# Patient Record
Sex: MALE | Race: WHITE | NOT HISPANIC OR LATINO | Employment: UNEMPLOYED | URBAN - NONMETROPOLITAN AREA
[De-identification: names, ages, dates, MRNs, and addresses within clinical notes are randomized per-mention and may not be internally consistent; named-entity substitution may affect disease eponyms.]

---

## 2017-03-06 DIAGNOSIS — F41.9 ANXIETY: ICD-10-CM

## 2017-03-06 RX ORDER — BUPROPION HYDROCHLORIDE 150 MG/1
150 TABLET ORAL DAILY
Qty: 30 TABLET | Refills: 3 | Status: SHIPPED | OUTPATIENT
Start: 2017-03-06 | End: 2017-05-01

## 2017-03-20 ENCOUNTER — OFFICE VISIT (OUTPATIENT)
Dept: FAMILY MEDICINE CLINIC | Facility: CLINIC | Age: 22
End: 2017-03-20

## 2017-03-20 VITALS
WEIGHT: 172 LBS | HEART RATE: 90 BPM | BODY MASS INDEX: 24.62 KG/M2 | SYSTOLIC BLOOD PRESSURE: 120 MMHG | OXYGEN SATURATION: 98 % | DIASTOLIC BLOOD PRESSURE: 70 MMHG | HEIGHT: 70 IN

## 2017-03-20 DIAGNOSIS — R41.3 MEMORY LOSS: ICD-10-CM

## 2017-03-20 DIAGNOSIS — G89.29 CHRONIC BILATERAL LOW BACK PAIN WITH BILATERAL SCIATICA: Primary | ICD-10-CM

## 2017-03-20 DIAGNOSIS — M54.42 CHRONIC BILATERAL LOW BACK PAIN WITH BILATERAL SCIATICA: Primary | ICD-10-CM

## 2017-03-20 DIAGNOSIS — M54.41 CHRONIC BILATERAL LOW BACK PAIN WITH BILATERAL SCIATICA: Primary | ICD-10-CM

## 2017-03-20 PROCEDURE — 99213 OFFICE O/P EST LOW 20 MIN: CPT | Performed by: FAMILY MEDICINE

## 2017-03-20 RX ORDER — MELOXICAM 15 MG/1
15 TABLET ORAL DAILY
Qty: 30 TABLET | Refills: 3 | Status: SHIPPED | OUTPATIENT
Start: 2017-03-20 | End: 2017-07-24 | Stop reason: SDUPTHER

## 2017-03-20 NOTE — PROGRESS NOTES
Subjective:     Antione Villaseñor is a 21 y.o. male who presents for follow up for arrhythmia.    Murmur:   Patient has a hx of heart murmur. He had a suspected arrythmia. He was supposed to wear a heart monitor, but never went back for a follow up appointment. He has occasionally has palpations. This happens 2-3 times per week. It last 10-15 seconds. He feels that this is due to anxiety.      Short term memory loss:   Patient is still having some short term memory loss. He used some anabolic steroids in Jan 2016. He thinks he got a bad batch. After using it he has mental fogginess and short term memory loss. He was started on Wellbutrin. He feels that the Wellbutrin has been helping. He has continue to have memory loss.     Lower back pain:   He has bilateral lower back pain. He has shooting pain to left ankle and on the right it goes to his right buttocks. He has tried at home physical therapy. He was unable to go to PT due to work schedule. He has tried Ibuprofen, which seems to help some. He does not want to take this every day. He has take a prescription strength NSAID for 3 months in the past. He didn't feel that this helped. He has morning when he awakens and has so much pain he can not walk. He feels that his left leg is weak at those times. He occasionally has difficult urinating. At these time it feels it is difficult to start a stream. No difficulties with BM's.    Preventative:  Over the past 2 weeks, have you felt down, depressed, or hopeless?No   Over the past 2 weeks, have you felt little interest or pleasure in doing things?No  Clinical depression screening refused by patient.No     On osteoporosis therapy?Not Indicated     Past Medical Hx:  Past Medical History   Diagnosis Date   • Ankle joint pain    • Bipolar disorder    • Burning feet syndrome    • Depressive disorder    • Dysuria      Dysuria - OVER ACTIVE BLADDER VS POLYURIA      • Encounter for general adult medical examination without  abnormal findings    • Heart murmur    • Infertile male syndrome    • Intervertebral disc disorders with radiculopathy, lumbar region    • Low back pain    • Male hypogonadism      Male hypogonadism - not receiving testosterone replacement by urology      • Pain in unspecified foot    • Palpitations        Past Surgical Hx:  Past Surgical History   Procedure Laterality Date   • Injection of medication       Kenalog (1)      10/05/2015           Health Maintenance:  Health Maintenance   Topic Date Due   • TDAP/TD VACCINES (2 - Td) 01/01/2021   • INFLUENZA VACCINE  Addressed       Current Meds:    Current Outpatient Prescriptions:   •  buPROPion XL (WELLBUTRIN XL) 150 MG 24 hr tablet, Take 1 tablet by mouth Daily., Disp: 30 tablet, Rfl: 3  •  meloxicam (MOBIC) 15 MG tablet, Take 1 tablet by mouth Daily., Disp: 30 tablet, Rfl: 3    Allergies:  Review of patient's allergies indicates no known allergies.    Family Hx:  Family History   Problem Relation Age of Onset   • Asthma Other    • Diabetes Other    • Heart disease Other    • Hypertension Other    • Lung disease Other    • Coronary artery disease Father    • Hypertension Father         Social History:  Social History     Social History   • Marital status: Single     Spouse name: N/A   • Number of children: N/A   • Years of education: N/A     Occupational History   • Not on file.     Social History Main Topics   • Smoking status: Never Smoker   • Smokeless tobacco: Never Used   • Alcohol use No   • Drug use: No   • Sexual activity: Yes     Partners: Female     Other Topics Concern   • Not on file     Social History Narrative       Review of Systems  Review of Systems   Constitutional: Negative for chills, fatigue and fever.   HENT: Negative for ear pain, hearing loss, sore throat and trouble swallowing.    Eyes: Negative for pain and visual disturbance.   Respiratory: Negative for cough and shortness of breath.    Cardiovascular: Positive for palpitations. Negative  "for chest pain.   Gastrointestinal: Negative for abdominal pain, constipation, diarrhea, nausea and vomiting.   Genitourinary: Positive for frequency and urgency. Negative for dysuria and hematuria.   Musculoskeletal: Positive for back pain. Negative for neck pain.   Skin: Negative for rash and wound.   Neurological: Positive for dizziness. Negative for syncope and headaches.        Patient has normal sensation to light touch, pin pick, and vibration in lower extremities bilaterally.    Psychiatric/Behavioral: Negative for confusion and hallucinations. The patient is not nervous/anxious.        Objective:     Visit Vitals   • /70   • Pulse 90   • Ht 70\" (177.8 cm)   • Wt 172 lb (78 kg)   • SpO2 98%   • BMI 24.68 kg/m2      Physical Exam   Vitals reviewed.    Assessment/Plan:     1. Chronic bilateral low back pain with bilateral sciatica    2. Memory loss       Patient has continued to have problems with his memory and mental fogginess since using the steroids. He has been on the Wellbutrin Xl 150 mg qdaily for more than one year. He wants to wean down on this. I discussed this with the patient. He will take one tablet every other day for 2 weeks, then stop. Patient has had chronic lower back pain with sciatic pain in both legs. He had an MRI in 2015 which showed no abnormalities. He has had no change in the pain in 6 years. I will refer him to Dr. Vasquez for evaluation.   Plan:  1.) I will prescribe Mobic 15 mg qdaily  2.) I will refer the patient to Dr. Vasquez with Orthopedic surgery for evaluation  3.) Patient will take Wellbutrin 150 XL every other day for 2 weeks then stop.     Follow-up:     Return in about 4 weeks (around 4/17/2017).        Preventative:  Male Preventative: Exercises regularly  Vaccines:   Tetanus vaccine: up to date  Annual influenza vaccine: not up to date - Patient refuses this   Pneumococcal vaccine: not up to date - Not indicated at this time.   Hep B vaccine: not up to date - Not " indicated at this time.    Zoster vaccine:not up to date - Not indicated at this time.     Patient does not smoke  does not drink  eat more fruits and vegetables, decrease soda or juice intake and increase physical activity    RISK SCORE: 2              This document has been electronically signed by Memo Hall MD on March 20, 2017 11:37 AM

## 2017-03-21 ENCOUNTER — TRANSCRIBE ORDERS (OUTPATIENT)
Dept: CARDIOLOGY | Facility: HOSPITAL | Age: 22
End: 2017-03-21

## 2017-03-21 DIAGNOSIS — R06.03 ACUTE RESPIRATORY DISTRESS: Primary | ICD-10-CM

## 2017-03-27 ENCOUNTER — HOSPITAL ENCOUNTER (OUTPATIENT)
Dept: CARDIOLOGY | Facility: HOSPITAL | Age: 22
Discharge: HOME | End: 2017-03-27
Attending: INTERNAL MEDICINE | Admitting: INTERNAL MEDICINE

## 2017-03-27 DIAGNOSIS — R06.03 ACUTE RESPIRATORY DISTRESS: ICD-10-CM

## 2017-03-27 PROCEDURE — 93017 CV STRESS TEST TRACING ONLY: CPT

## 2017-03-27 NOTE — H&P
Cardiology History and Physical Note.        Patient Name: Antione Villaseñor  Age/Sex: 21 y.o. male  : 1995  MRN: [id number]    Date of Admission : 3/27/2017    Primary care Physician: Memo Hall MD    Reason for Admission:  Chest pain symptomatic palpitation and regular stress test.      Subjective:       Chief Complaint: Pain symptomatic palpitation    History of Present Illness:  Antione Villaseñor is a 21 y.o. male     There is no height or weight on file to calculate BMI. with a past medical history significant for attention deficit hyperactivity disorder strong family history for coronary artery disease who presents for further evaluation for symptoms of syncope and for symptoms of chest pain.  Patient does have strong family history for coronary artery disease.  Patient had 1 episodes of transient loss of consciousness after the recent demise of his father with coronary artery disease.    Patient presents with symptoms of palpitation along with shortness of breath.  Patient has not been involved in any physical strenuous activity and has had symptoms of palpitation.  Patient resting echocardiogram done reveals sinus rhythm at the rate of 62 bpm.  Patient does complain of having left leg discomfort which she has attributed to sciatica no damage.  Patient on further questioning denies any hemoptysis hematuria bright red blood per rectum.  Patient last blood tests evaluation was a year ago.  Patient denies excessive intake of salt.  Patient denies any excessive intake of coffee or caffeinated beverage.    Patient 10 point review of system except for what is stated in the history of present illness is negative    Past Medical History:  Past Medical History:   Diagnosis Date   • Ankle joint pain    • Bipolar disorder    • Burning feet syndrome    • Depressive disorder    • Dysuria     Dysuria - OVER ACTIVE BLADDER VS POLYURIA      • Encounter for general adult medical examination without  abnormal findings    • Heart murmur    • Infertile male syndrome    • Intervertebral disc disorders with radiculopathy, lumbar region    • Low back pain    • Male hypogonadism     Male hypogonadism - not receiving testosterone replacement by urology      • Pain in unspecified foot    • Palpitations        Past Surgical History:  Past Surgical History:   Procedure Laterality Date   • INJECTION OF MEDICATION      Kenalog (1)      10/05/2015           Family History:  Family History   Problem Relation Age of Onset   • Asthma Other    • Diabetes Other    • Heart disease Other    • Hypertension Other    • Lung disease Other    • Coronary artery disease Father    • Hypertension Father        Social History:  Social History     Social History   • Marital status: Single     Spouse name: N/A   • Number of children: N/A   • Years of education: N/A     Occupational History   • Not on file.     Social History Main Topics   • Smoking status: Never Smoker   • Smokeless tobacco: Never Used   • Alcohol use No   • Drug use: No   • Sexual activity: Yes     Partners: Female     Other Topics Concern   • Not on file     Social History Narrative        Cardiac Risk factor: Male and Family history       Allergies:  No Known Allergies    Medication::    (Not in a hospital admission)        Review of Systems:       Constitutional:  Denies recent weight loss, weight gain, fever or chills, no change in exercise tolerance     HENT:  Denies any hearing loss, epistaxis, hoarseness, or difficulty speaking.     Eyes: Wears eyeglasses or contact lenses     Respiratory:  Denies dyspnea with exertion,no cough, wheezing, or hemoptysis.     Cardiovascular: Negative for palpations, chest pain, orthopnea, PND, peripheral edema, syncope, or claudication.     Gastrointestinal:  Denies change in bowel habits, dyspepsia, ulcer disease, hematochezia, or melena.  No nausea, no vomiting, no hematemesis, no diarrhea or constipation, no melena      Endocrine:  Negative for cold intolerance, heat intolerance, polydipsia, polyphagia and polyuria. Denies any history of weight change, heat/cold intolerance, polydipsia, polyuria     Genitourinary: Negative hor hematuria.      Musculoskeletal: Denies any history of arthritic symptoms or back problems .  No joint pain, joint stiffness, joint swelling, muscle pain, muscle weakness and neck pain    Skin:  Denies any change in hair or nails, rashes, or skin lesions.     Allergic/Immunologic: Negative.  Negative for environmental allergies, food allergies and immunocompromised state.     Neurological:  Denies any history of recurrent headaches, strokes, TIA, or seizure disorder.     Hematological: Denies excessive bleeding, easy bruising, fatigue, lymphadenopathy and petechiae or any bleeding disorders, or lymphadenopathy.     Psychiatric/Behavioral: Denies any history of depression, substance abuse, or change in cognitive function. Denies any psychomotor reaction or tangential thought.  No depression, homicidal ideations and suicidal ideations    Endocrine: No frequent urination and nocturia, temperature intolerance, weight gain, unintended and weight loss, unintended            Objective:     Objective:  There were no vitals filed for this visit.  .    There is no height or weight on file to calculate BMI.           Physical Exam:   General Appearance:    Alert, oriented, cooperative, in no acute distress   Head:    Normocephalic, atraumatic, without obvious abnormality   Eyes:           ARIELLA  Lids and lashes normal, conjunctivae and sclerae normal, no icterus, no pallor   Ears:    Ears appear intact with no abnormalities noted   Throat:   Mucous membranes pink and moist   Neck:   Supple, trachea midline, no carotid bruit, no organomegaly or JVD   Lungs:     Clear to auscultation and percussion, respirations regular, even and Unlabored. No wheezes, rales, rhonchi    Heart:    Regular rhythm and normal rate, normal S1 and S2, no             murmur, no gallop, no rub, no click   Abdomen:     Soft, non-tender, non-distended, no guarding, no rebound tenderness, Normal bowel sounds in all four quadrant, no masses, liver and spleen nonpalpable,    Genitalia:    Deferred   Extremities:   Moves all extremities well, no edema, no cyanosis, no              Redness, no clubbing   Pulses:   Pulses palpable and equal bilaterally   Skin:   Moist and warm. No bleeding, bruising or rash   Neurologic/Psychiatric:   Alert and oriented to person, place, and time.  Motor, power and tone in upper and lower extremity is grossly intact.  No focal neurological deficits. Normal cognitive function. No psychomotor reaction or tangential thought. No depression, homicidal ideations and suicidal ideations           Lab Review:           Invalid input(s): LABALBU, PROT                                Invalid input(s):  T4,  FREET4    EKG:   ECG/EMG Results (last 24 hours)     ** No results found for the last 24 hours. **          Imaging:  Imaging Results (last 24 hours)     ** No results found for the last 24 hours. **          I personally viewed and interpreted the patient's EKG/Telemetry data.    Assessment:   1.  Chest pain with shortness of breath symptomatic palpitation.  2.  Attention deficit hyperactivity disorder.  3.  Strong family history for coronary artery disease.          Plan:   1.  Chest pain which family history for coronary artery disease symptomatic palpitation.  Patient was recommended to undergo a transthoracic echocardiogram and a regular exercise stress tests.  Risk-benefit treatment options were discussed with the patient.  2.  Attention deficit hyperactivity disorder.  Patient is currently on Wellbutrin and has been followed by the primary care physician.      Time: time spent in face-to-face evaluation off greater than 60 minutes and interacting and formulating examining and discussing the plan with the patient with 50% of greater time spent in  face-to-face interaction.    Jaime Andre MD  03/27/17  1:31 PM      EMR Dragon/Transcription disclaimer:   Some of this note may be an electronic transcription/translation of spoken language to printed text. The electronic translation of spoken language may permit erroneous, or at times, nonsensical words or phrases to be inadvertently transcribed; Although I have reviewed the note for such errors, some may still exist.

## 2017-04-16 LAB
BH CV STRESS BP STAGE 1: NORMAL
BH CV STRESS BP STAGE 2: NORMAL
BH CV STRESS BP STAGE 3: NORMAL
BH CV STRESS BP STAGE 4: NORMAL
BH CV STRESS DURATION MIN STAGE 1: 3
BH CV STRESS DURATION MIN STAGE 2: 3
BH CV STRESS DURATION MIN STAGE 3: 3
BH CV STRESS DURATION MIN STAGE 4: 3
BH CV STRESS DURATION SEC STAGE 1: 0
BH CV STRESS DURATION SEC STAGE 2: 0
BH CV STRESS DURATION SEC STAGE 3: 0
BH CV STRESS DURATION SEC STAGE 4: 0
BH CV STRESS GRADE STAGE 1: 10
BH CV STRESS GRADE STAGE 2: 12
BH CV STRESS GRADE STAGE 3: 14
BH CV STRESS GRADE STAGE 4: 16
BH CV STRESS HR STAGE 1: 96
BH CV STRESS HR STAGE 2: 111
BH CV STRESS HR STAGE 3: 136
BH CV STRESS HR STAGE 4: 187
BH CV STRESS METS STAGE 1: 5
BH CV STRESS METS STAGE 2: 7.5
BH CV STRESS METS STAGE 3: 10
BH CV STRESS METS STAGE 4: 13.5
BH CV STRESS PROTOCOL 1: NORMAL
BH CV STRESS RECOVERY BP: NORMAL MMHG
BH CV STRESS RECOVERY HR: 100 BPM
BH CV STRESS SPEED STAGE 1: 1.7
BH CV STRESS SPEED STAGE 2: 2.5
BH CV STRESS SPEED STAGE 3: 3.4
BH CV STRESS SPEED STAGE 4: 4.2
BH CV STRESS STAGE 1: 1
BH CV STRESS STAGE 2: 2
BH CV STRESS STAGE 3: 3
BH CV STRESS STAGE 4: 4
MAXIMAL PREDICTED HEART RATE: 197 BPM
PERCENT MAX PREDICTED HR: 96.45 %
STRESS BASELINE BP: NORMAL MMHG
STRESS BASELINE HR: 70 BPM
STRESS PERCENT HR: 113 %
STRESS POST ESTIMATED WORKLOAD: 13.4 METS
STRESS POST EXERCISE DUR MIN: 12 MIN
STRESS POST PEAK BP: NORMAL MMHG
STRESS POST PEAK HR: 190 BPM
STRESS TARGET HR: 167 BPM

## 2017-04-24 ENCOUNTER — OFFICE VISIT (OUTPATIENT)
Dept: ORTHOPEDIC SURGERY | Facility: CLINIC | Age: 22
End: 2017-04-24

## 2017-04-24 VITALS — BODY MASS INDEX: 25.05 KG/M2 | HEIGHT: 70 IN | WEIGHT: 175 LBS

## 2017-04-24 DIAGNOSIS — M51.16 INTERVERTEBRAL DISC DISORDERS WITH RADICULOPATHY, LUMBAR REGION: Primary | ICD-10-CM

## 2017-04-24 PROCEDURE — 72100 X-RAY EXAM L-S SPINE 2/3 VWS: CPT | Performed by: ORTHOPAEDIC SURGERY

## 2017-04-24 PROCEDURE — 99202 OFFICE O/P NEW SF 15 MIN: CPT | Performed by: ORTHOPAEDIC SURGERY

## 2017-04-24 NOTE — PROGRESS NOTES
Procedure   Procedures      Lumbar spine 2v- normal alignment, good bone quality, disc heights are appropriate.  Vertebra are normal.
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
## 2017-05-01 ENCOUNTER — OFFICE VISIT (OUTPATIENT)
Dept: FAMILY MEDICINE CLINIC | Facility: CLINIC | Age: 22
End: 2017-05-01

## 2017-05-01 VITALS
DIASTOLIC BLOOD PRESSURE: 90 MMHG | WEIGHT: 178 LBS | SYSTOLIC BLOOD PRESSURE: 140 MMHG | HEIGHT: 70 IN | OXYGEN SATURATION: 99 % | HEART RATE: 89 BPM | BODY MASS INDEX: 25.48 KG/M2

## 2017-05-01 DIAGNOSIS — F41.1 GENERALIZED ANXIETY DISORDER: Primary | ICD-10-CM

## 2017-05-01 DIAGNOSIS — M51.16 INTERVERTEBRAL DISC DISORDERS WITH RADICULOPATHY, LUMBAR REGION: ICD-10-CM

## 2017-05-01 PROCEDURE — 99213 OFFICE O/P EST LOW 20 MIN: CPT | Performed by: FAMILY MEDICINE

## 2017-05-01 RX ORDER — BUSPIRONE HYDROCHLORIDE 5 MG/1
5 TABLET ORAL 2 TIMES DAILY
Qty: 60 TABLET | Refills: 2 | Status: SHIPPED | OUTPATIENT
Start: 2017-05-01 | End: 2017-05-26 | Stop reason: SDUPTHER

## 2017-05-03 DIAGNOSIS — M54.5 ACUTE LOW BACK PAIN, UNSPECIFIED BACK PAIN LATERALITY, WITH SCIATICA PRESENCE UNSPECIFIED: Primary | ICD-10-CM

## 2017-05-03 PROBLEM — M54.9 BACK PAIN: Status: ACTIVE | Noted: 2017-05-03

## 2017-05-09 PROCEDURE — 87081 CULTURE SCREEN ONLY: CPT | Performed by: FAMILY MEDICINE

## 2017-05-26 ENCOUNTER — TELEPHONE (OUTPATIENT)
Dept: FAMILY MEDICINE CLINIC | Facility: CLINIC | Age: 22
End: 2017-05-26

## 2017-05-26 DIAGNOSIS — F41.1 GENERALIZED ANXIETY DISORDER: ICD-10-CM

## 2017-05-26 RX ORDER — BUSPIRONE HYDROCHLORIDE 5 MG/1
TABLET ORAL
Qty: 90 TABLET | Refills: 0 | Status: SHIPPED | OUTPATIENT
Start: 2017-05-26 | End: 2017-06-26

## 2017-06-07 ENCOUNTER — TELEPHONE (OUTPATIENT)
Dept: ORTHOPEDIC SURGERY | Facility: CLINIC | Age: 22
End: 2017-06-07

## 2017-06-07 DIAGNOSIS — M54.5 ACUTE LOW BACK PAIN, UNSPECIFIED BACK PAIN LATERALITY, WITH SCIATICA PRESENCE UNSPECIFIED: Primary | ICD-10-CM

## 2017-06-07 DIAGNOSIS — M51.16 INTERVERTEBRAL DISC DISORDERS WITH RADICULOPATHY, LUMBAR REGION: ICD-10-CM

## 2017-06-12 ENCOUNTER — HOSPITAL ENCOUNTER (OUTPATIENT)
Dept: PHYSICAL THERAPY | Facility: HOSPITAL | Age: 22
Setting detail: THERAPIES SERIES
Discharge: HOME | End: 2017-06-12
Attending: ORTHOPAEDIC SURGERY

## 2017-06-12 DIAGNOSIS — M54.40 MIDLINE LOW BACK PAIN WITH SCIATICA, SCIATICA LATERALITY UNSPECIFIED, UNSPECIFIED CHRONICITY: Primary | ICD-10-CM

## 2017-06-12 DIAGNOSIS — M51.16 INTERVERTEBRAL DISC DISORDER WITH RADICULOPATHY OF LUMBAR REGION: ICD-10-CM

## 2017-06-12 PROCEDURE — 97162 PT EVAL MOD COMPLEX 30 MIN: CPT | Performed by: PHYSICAL THERAPIST

## 2017-06-12 NOTE — PROGRESS NOTES
Outpatient Physical Therapy Ortho Initial Evaluation  [facility]     Patient Name: Antione Villaseñor  : 1995  MRN: [id number]  Today's Date: 2017    Attendance: . Not yet approved by insurance. Precert required.  MD follow up- none scheduled.    Visit Date: 2017    Patient Active Problem List   Diagnosis   • Palpitations   • Pain in unspecified foot   • Male hypogonadism   • Low back pain   • Intervertebral disc disorders with radiculopathy, lumbar region   • Infertile male syndrome   • Heart murmur   • Encounter for general adult medical examination without abnormal findings   • Dysuria   • Depressive disorder   • Burning feet syndrome   • Bipolar disorder   • Ankle joint pain   • Memory loss   • Back pain        Past Medical History:   Diagnosis Date   • Ankle joint pain    • Bipolar disorder    • Burning feet syndrome    • Depressive disorder    • Dysuria     Dysuria - OVER ACTIVE BLADDER VS POLYURIA      • Encounter for general adult medical examination without abnormal findings    • Heart murmur    • Infertile male syndrome    • Intervertebral disc disorders with radiculopathy, lumbar region    • Low back pain    • Male hypogonadism     Male hypogonadism - not receiving testosterone replacement by urology      • Pain in unspecified foot    • Palpitations      Outpatient Medications     busPIRone (BUSPAR) 5 MG tablet      meloxicam (MOBIC) 15 MG tablet      ALLERGIES: NKDA     Past Surgical History:   Procedure Laterality Date   • INJECTION OF MEDICATION      Kenalog (1)      10/05/2015           Visit Dx:     ICD-10-CM ICD-9-CM   1. Midline low back pain with sciatica, sciatica laterality unspecified, unspecified chronicity M54.40 724.3   2. Intervertebral disc disorder with radiculopathy of lumbar region M51.16 724.4             Patient History       17 1100          History    Chief Complaint Pain  -DD      Type of Pain Lower Extremity / Leg  -DD      Date Current  Problem(s) Began --   a few years  -DD      Brief Description of Current Complaint Patient reports left hip and leg sciatica.  The back really never hurts. It did when he lifted weights but not for a while now.  Occasional periodic right leg symptoms but rare. Left leg gets weak at times and he can barely walk.  -DD      Previous treatment for THIS PROBLEM Chiropractor;Medication  -DD      Occupation/sports/leisure activities Works for [employer] full time.  -DD        User Key  (r) = Recorded By, (t) = Taken By, (c) = Cosigned By    Initials Name Provider Type    COLLETTE Joseph, PT Physical Therapist                PT Ortho       06/12/17 1100    Subjective Comments    Subjective Comments When in High school went to chiropractor a lot.  Tries no to pop his own back anymore.  -DD    Subjective Pain    Able to rate subjective pain? yes  -DD    Pre-Treatment Pain Level 4  -DD    Post-Treatment Pain Level 4  -DD    Posture/Observations    Observations Muscle atrophy   left quad  -DD    Posture/Observations Comments seated slumped posture. Left LLD 1/4 inch. posterior left sacral base.  -DD    Sensation    Sensation WNL? WNL  -DD    Light Touch No apparent deficits  -DD    Neural Tension Signs- Lower Quarter Clearing    SLR Bilateral:;Negative  -DD    Prone knee flexion Left:;Positive  -DD    Myotomal Screen- Lower Quarter Clearing    Hip flexion (L2) 5 (Normal)  -DD    Knee extension (L3) 5 (Normal)  -DD    Ankle DF (L4) 5 (Normal)  -DD    Great toe extension (L5) 5 (Normal)  -DD    Ankle PF (S1) 5 (Normal)  -DD    Knee flexion (S2) 5 (Normal)  -DD    Lumbar ROM Screen- Lower Quarter Clearing    Lumbar Flexion Normal  -DD    Lumbar Extension Normal  -DD    Lumbar Lateral Flexion Normal  -DD    Lumbar Rotation Normal  -DD    SI/Hip Screen- Lower Quarter Clearing    Mena's/Lenin's test Left:;Positive  -DD      User Key  (r) = Recorded By, (t) = Taken By, (c) = Cosigned By    Initials Name Provider Type     COLLETTE Joseph, PT Physical Therapist                            Therapy Education       06/12/17 1158          Therapy Education    Given Posture/body mechanics  -DD      Program New  -DD      How Provided Verbal  -DD      Provided to Patient  -DD      Level of Understanding Teach back education performed;Demonstrated  -DD        User Key  (r) = Recorded By, (t) = Taken By, (c) = Cosigned By    Initials Name Provider Type    COLLETTE Joseph, PT Physical Therapist                PT OP Goals       06/12/17 1100       PT Short Term Goals    STG Date to Achieve 07/07/17  -DD     STG 1 Patient will be independent in a home exercise program  -DD     STG 2 Patient will be able to stretch left hip flexor without back pain  -DD     STG 3 Patient will maintain level sacral base  -DD     STG 4 Patient will maintain equal leg length   -DD     STG 5 Modified Oswestry score decreased to 6%  -DD     STG 6 Patient report decreased left lower extremity symptoms  -DD     Long Term Goals    LTG 1 Patient will be independent in final home exercise program  -DD     LTG 2 Patient will be able to report 50% improvement  -DD     Time Calculation    PT Goal Re-Cert Due Date 07/07/17  -DD       User Key  (r) = Recorded By, (t) = Taken By, (c) = Cosigned By    Initials Name Provider Type    COLLETTE Joseph, PT Physical Therapist                PT Assessment/Plan       06/12/17 1154       PT Assessment    Functional Limitations Impaired gait;Performance in self-care ADL;Performance in work activities;Performance in leisure activities  -DD     Impairments Gait;Pain;Posture  -DD     Assessment Comments Patient has chronic issues and is are ready in overall good physical condition.  He is not sure that additional exercises will help  -DD     Please refer to paper survey for additional self-reported information Yes  -DD     Rehab Potential Fair  -DD     Patient/caregiver participated in establishment of treatment plan  and goals Yes  -DD     Patient would benefit from skilled therapy intervention Yes  -DD     PT Plan    PT Frequency 2x/week  -DD     Predicted Duration of Therapy Intervention (days/wks) 4 weeks  -DD     Planned CPT's? PT EVAL MOD COMPLELITY: 71377;PT RE-EVAL: 09580;PT MANUAL THERAPY EA 15 MIN: 83203;PT NEUROMUSC RE-EDUCATION EA 15 MIN: 35171;PT HOT/COLD PACK WC NONMCARE: 02758;PT ELECTRICAL STIM UNATTEND:   -DD     Physical Therapy Interventions (Optional Details) manual therapy techniques;dry needling;home exercise program;modalities;ROM (Range of Motion);strengthening;stretching;joint mobilization;lumbar stabilization  -DD     PT Plan Comments Core stabilization activities lower extremity strengthening and flexibility activities.  There is quad atrophy on the left, close clinics chain activities.  Manual therapy for alignment.  May consider heel lift if leg length remains unequal.  Modalities may include electrical stimulation IFC and ice. consider trigger point dry needling  -DD       User Key  (r) = Recorded By, (t) = Taken By, (c) = Cosigned By    Initials Name Provider Type    COLLETTE Joseph, PT Physical Therapist                  Exercises       06/12/17 1100          Subjective Comments    Subjective Comments When in High school went to chiropractor a lot.  Tries no to pop his own back anymore.  -DD      Subjective Pain    Able to rate subjective pain? yes  -DD      Pre-Treatment Pain Level 4  -DD      Post-Treatment Pain Level 4  -DD        User Key  (r) = Recorded By, (t) = Taken By, (c) = Cosigned By    Initials Name Provider Type    COLLETTE Joseph, JACOB Physical Therapist                              Outcome Measures       06/12/17 1100          Modified Oswestry    Modified Oswestry Score/Comments 10%  -DD      Functional Assessment    Outcome Measure Options Modifed Owestry  -DD        User Key  (r) = Recorded By, (t) = Taken By, (c) = Cosigned By    Initials Name Provider Type     DD Alia Joseph, PT Physical Therapist            Time Calculation:   Start Time: 1101  Stop Time: 1129  Time Calculation (min): 28 min  Total Timed Code Minutes- PT: 0 minute(s)     Therapy Charges for Today     Code Description Service Date Service Provider Modifiers Qty    89356718079 HC PT EVAL MOD COMPLEXITY 2 6/12/2017 Alia Joseph, PT GP 1          PT G-Codes  Outcome Measure Options: Modifed Owestry         Alia Joseph, PT, ATC, DPT  6/12/2017

## 2017-06-15 ENCOUNTER — HOSPITAL ENCOUNTER (OUTPATIENT)
Dept: PHYSICAL THERAPY | Facility: HOSPITAL | Age: 22
Setting detail: THERAPIES SERIES
Discharge: HOME | End: 2017-06-15
Attending: ORTHOPAEDIC SURGERY

## 2017-06-15 DIAGNOSIS — M54.40 MIDLINE LOW BACK PAIN WITH SCIATICA, SCIATICA LATERALITY UNSPECIFIED, UNSPECIFIED CHRONICITY: Primary | ICD-10-CM

## 2017-06-15 DIAGNOSIS — M51.16 INTERVERTEBRAL DISC DISORDER WITH RADICULOPATHY OF LUMBAR REGION: ICD-10-CM

## 2017-06-15 PROCEDURE — 97110 THERAPEUTIC EXERCISES: CPT

## 2017-06-26 ENCOUNTER — OFFICE VISIT (OUTPATIENT)
Dept: FAMILY MEDICINE CLINIC | Facility: CLINIC | Age: 22
End: 2017-06-26

## 2017-06-26 ENCOUNTER — HOSPITAL ENCOUNTER (OUTPATIENT)
Dept: PHYSICAL THERAPY | Facility: HOSPITAL | Age: 22
Setting detail: THERAPIES SERIES
Discharge: HOME | End: 2017-06-26

## 2017-06-26 VITALS
HEIGHT: 70 IN | BODY MASS INDEX: 24.79 KG/M2 | OXYGEN SATURATION: 99 % | HEART RATE: 54 BPM | DIASTOLIC BLOOD PRESSURE: 66 MMHG | WEIGHT: 173.19 LBS | SYSTOLIC BLOOD PRESSURE: 120 MMHG

## 2017-06-26 DIAGNOSIS — F41.9 ANXIETY: Primary | ICD-10-CM

## 2017-06-26 DIAGNOSIS — M54.40 MIDLINE LOW BACK PAIN WITH SCIATICA, SCIATICA LATERALITY UNSPECIFIED, UNSPECIFIED CHRONICITY: Primary | ICD-10-CM

## 2017-06-26 DIAGNOSIS — M51.16 INTERVERTEBRAL DISC DISORDER WITH RADICULOPATHY OF LUMBAR REGION: ICD-10-CM

## 2017-06-26 PROCEDURE — 97110 THERAPEUTIC EXERCISES: CPT

## 2017-06-26 PROCEDURE — 99213 OFFICE O/P EST LOW 20 MIN: CPT | Performed by: FAMILY MEDICINE

## 2017-06-26 RX ORDER — BUPROPION HYDROCHLORIDE 150 MG/1
150 TABLET ORAL DAILY
Qty: 30 TABLET | Refills: 0 | Status: SHIPPED | OUTPATIENT
Start: 2017-06-26 | End: 2017-07-24 | Stop reason: SDUPTHER

## 2017-06-26 NOTE — PROGRESS NOTES
"Subjective   Antione Villaseñor is a 21 y.o. male.     History of Present Illness  Patient is a 21-year-old  male who is presenting today as an anxiety follow-up.  Patient mentions steroid abuse early in the past.  Patient has been on several SSRIs as well as has had success with Wellbutrin in the past.  Patient proximally month ago was put on BuSpar and states that early on he was having good control but over the past 2 nights he has developed some mood swings from this medication and is wondering if he could go back on the Wellbutrin.  Patient denies any fevers, chest pain, shortness of breath, or any other complaints at this time.  The following portions of the patient's history were reviewed and updated as appropriate: allergies, current medications, past family history, past medical history, past social history, past surgical history and problem list.    Review of Systems      Constitutional: no weight loss, fever, chills, weakness  HEENT: no visual loss, blurred vision, double vision, yellow scalarea  Skin: no rash, no discoloration   CVS: no chest pain, palpitations  Chest: no shortness of air, cough, dyspnea  GI: no abdominal pain, blood in stool, no nausea, vomiting, diarrhea  : no burning in urination, change in odor, no change in frequency  Neuro: no headache, dizziness, syncope, paralysis, ataxia, numbness  Musculoskeletal: no muscle, back pain, joint pain, stiffness  Lymphatics: no enlarged nodes  Endo: no reports of sweating, cold or heat intolerance, no polyuria      Objective   Physical Exam  /66 (BP Location: Left arm, Patient Position: Sitting, Cuff Size: Adult)  Pulse 54  Ht 70\" (177.8 cm)  Wt 173 lb 3 oz (78.6 kg)  SpO2 99%  BMI 24.85 kg/m2      GENERAL APPEARANCE: Well developed, well nourished, in no acute distress.  SKIN: Inspection of the skin reveals no rashes, ulcerations or petechiae.  HEENT: The sclerae were anicteric and conjunctivae were pink and moist. " Extraocular movements were intact and pupils were equal, round, and reactive to light with normal accommodation. External inspection of the ears and nose showed no scars, lesions, or masses. Lips, teeth, and gums showed normal mucosa. The oral mucosa, hard and soft palate, tongue and posterior pharynx were normal.  NECK: Supple and symmetric. There was no thyroid enlargement, and no tenderness, or masses were felt.  CHEST: Normal AP diameter and normal contour without any kyphoscoliosis.  LUNGS: Auscultation of the lungs revealed normal breath sounds without any other adventitious sounds or rubs.  CARDIOVASCULAR: There was a regular rate and rhythm without any murmurs, gallops, rubs. The carotid pulses were normal and 2+ bilaterally without bruits. Peripheral pulses were 2+ and symmetric.  ABDOMEN: Soft and nontender with normal bowel sounds. The liver edge was nontender. The spleen was not palpable. There were no inguinal or umbilical hernias noted. No ascites was noted.      Assessment/Plan   Antione was seen today for anxiety.    Diagnoses and all orders for this visit:    Anxiety    Other orders  -     buPROPion XL (WELLBUTRIN XL) 150 MG 24 hr tablet; Take 1 tablet by mouth Daily.             This document has been electronically signed by Valeria Ambrose MD on June 26, 2017 10:20 AM

## 2017-06-26 NOTE — THERAPY TREATMENT NOTE
Outpatient Physical Therapy Ortho Treatment Note  [facility]     Patient Name: Antione Villaseñor  : 1995  MRN: [id number]  Today's Date: 2017      Visit Date: 2017     Sub imp 0%  Visit 3/3 (Eval + 8)  MD Goodwin  Re 7/3/17    Visit Dx:    ICD-10-CM ICD-9-CM   1. Midline low back pain with sciatica, sciatica laterality unspecified, unspecified chronicity M54.40 724.3   2. Intervertebral disc disorder with radiculopathy of lumbar region M51.16 724.4       Patient Active Problem List   Diagnosis   • Palpitations   • Pain in unspecified foot   • Male hypogonadism   • Low back pain   • Intervertebral disc disorders with radiculopathy, lumbar region   • Infertile male syndrome   • Heart murmur   • Encounter for general adult medical examination without abnormal findings   • Dysuria   • Depressive disorder   • Burning feet syndrome   • Bipolar disorder   • Ankle joint pain   • Memory loss   • Back pain   • Anxiety        Past Medical History:   Diagnosis Date   • Ankle joint pain    • Bipolar disorder    • Burning feet syndrome    • Depressive disorder    • Dysuria     Dysuria - OVER ACTIVE BLADDER VS POLYURIA      • Encounter for general adult medical examination without abnormal findings    • Heart murmur    • Infertile male syndrome    • Intervertebral disc disorders with radiculopathy, lumbar region    • Low back pain    • Male hypogonadism     Male hypogonadism - not receiving testosterone replacement by urology      • Pain in unspecified foot    • Palpitations         Past Surgical History:   Procedure Laterality Date   • INJECTION OF MEDICATION      Kenalog (1)      10/05/2015                 PT Ortho       17 1300    Posture/Observations    Posture/Observations Comments (P)  post torsion L hemipelvis/ lumbars Rot. sacrum deep R  -MANJEET      User Key  (r) = Recorded By, (t) = Taken By, (c) = Cosigned By    Initials Name Provider Type    MANJEET Yancey, ATC                              PT Assessment/Plan       06/26/17 1400       PT Assessment    Assessment Comments (P)  correctely SI/lumbars/sacrum post ME/Mobe. issued heel lift for L to help maintain LL. Reported decreased weakness feeling with L LE post distraction, neural tension stretch  -MANJEET     PT Plan    PT Plan Comments (P)  Cont per POC, manual, neural tension, stab  -MANJEET       User Key  (r) = Recorded By, (t) = Taken By, (c) = Cosigned By    Initials Name Provider Type    MANJEET Yancey ATC                     Exercises       06/26/17 1300          Subjective Comments    Subjective Comments (P)  reports no real pain. cont weakness/numbness with L LE to ankle  -MANJEET      Subjective Pain    Able to rate subjective pain? (P)  yes  -MANJEET      Pre-Treatment Pain Level (P)  1  -MANJEET      Post-Treatment Pain Level (P)  0  -MANJEET      Aquatics    Aquatics performed? (P)  No  -MANJEET      Exercise 1    Exercise Name 1 (P)  Pro II level 2 seat 9  -MANJEET      Time (Minutes) 1 (P)  5  -MANJEET      Exercise 2    Exercise Name 2 (P)  HS stretch   -MANJEET      Reps 2 (P)  3  -MANJEET      Time (Seconds) 2 (P)  30  -MANJEET      Exercise 3    Exercise Name 3 (P)  incline calf stretch   pain in ankle with CR  -MANJEET      Sets 3 (P)  3  -MANJEET      Reps 3 (P)  --  -MANJEET      Time (Seconds) 3 (P)  30  -MANJEET      Exercise 4    Exercise Name 4 (P)  supine piriformis stretch  -MANJEET      Reps 4 (P)  3  -MANJEET      Time (Seconds) 4 (P)  30  -MANJEET      Exercise 5    Exercise Name 5 (P)  Manual L ham stretch  -MANJEET      Sets 5 (P)  3  -MANJEET      Reps 5 (P)  --  -MANJEET      Time (Seconds) 5 (P)  30  -MANJEET      Exercise 6    Exercise Name 6 (P)  bridges w Add  -MANJEET      Sets 6 (P)  2  -MANJEET      Reps 6 (P)  10  -MANJEET      Exercise 7    Exercise Name 7 (P)  hooklying trans abs with Add  -MANJEET      Sets 7 (P)  2  -MANJEET      Reps 7 (P)  10  -MANJEET      Exercise 8    Exercise Name 8 (P)  L LE distraction  -MANJEET      Reps 8 (P)  --  -MANJEET      Time (Minutes) 8 (P)  5  -MANJEET      Exercise 9    Exercise Name  9 (P)  Slump neural tension  -MANJEET      Sets 9 (P)  --   10  -MANJEET      Reps 9 (P)  10  -MANJEET        User Key  (r) = Recorded By, (t) = Taken By, (c) = Cosigned By    Initials Name Provider Type    MANJEET Yancey ATC                         Manual Rx (last 36 hours)      Manual Treatments       06/26/17 1300          Manual Rx 1    Manual Rx 1 Location (P)  MET/mob L SI/lumbar/Sacrum  -MANJEET        User Key  (r) = Recorded By, (t) = Taken By, (c) = Cosigned By    Initials Name Provider Type    MANJEET Yancey ATC                 PT OP Goals       06/26/17 1200       PT Short Term Goals    STG Date to Achieve (P)  07/07/17  -MANJEET     STG 1 (P)  Patient will be independent in a home exercise program  -MANJEET     STG 1 Progress (P)  Not Met  -MANJEET     STG 2 (P)  Patient will be able to stretch left hip flexor without back pain  -MANJEET     STG 2 Progress (P)  Not Met  -MANJEET     STG 3 (P)  Patient will maintain level sacral base  -MANJEET     STG 3 Progress (P)  Not Met  -MANEJET     STG 4 (P)  Patient will maintain equal leg length   -MANJEET     STG 4 Progress (P)  Not Met  -MANJEET     STG 5 (P)  Modified Oswestry score decreased to 6%  -MANJEET     STG 5 Progress (P)  Not Met  -MANJEET     STG 6 (P)  Patient report decreased left lower extremity symptoms  -MANJEET     Long Term Goals    LTG 1 (P)  Patient will be independent in final home exercise program  -MANJEET     LTG 1 Progress (P)  Not Met  -MANJEET     LTG 2 (P)  Patient will be able to report 50% improvement  -MANJEET     LTG 2 Progress (P)  Not Met  -MANJEET       User Key  (r) = Recorded By, (t) = Taken By, (c) = Cosigned By    Initials Name Provider Type    MANJEET Yancey [facility]                     Time Calculation:   Start Time: (P) 1300  Stop Time: (P) 1400  Time Calculation (min): (P) 60 min  Total Timed Code Minutes- PT: (P) 60 minute(s)    Therapy Charges for Today     Code Description Service Date Service Provider Modifiers Qty    47917592265 HC PT THER PROC EA 15 MIN  6/26/2017 Iftikhar Yancey, [facility]  4                    Iftikhar Yancey, RILEY  6/26/2017

## 2017-06-28 ENCOUNTER — HOSPITAL ENCOUNTER (OUTPATIENT)
Dept: PHYSICAL THERAPY | Facility: HOSPITAL | Age: 22
Setting detail: THERAPIES SERIES
End: 2017-06-28

## 2017-07-05 ENCOUNTER — HOSPITAL ENCOUNTER (OUTPATIENT)
Dept: PHYSICAL THERAPY | Facility: HOSPITAL | Age: 22
Setting detail: THERAPIES SERIES
Discharge: HOME | End: 2017-07-05

## 2017-07-05 DIAGNOSIS — M51.16 INTERVERTEBRAL DISC DISORDER WITH RADICULOPATHY OF LUMBAR REGION: ICD-10-CM

## 2017-07-05 DIAGNOSIS — M54.40 MIDLINE LOW BACK PAIN WITH SCIATICA, SCIATICA LATERALITY UNSPECIFIED, UNSPECIFIED CHRONICITY: Primary | ICD-10-CM

## 2017-07-05 PROCEDURE — 97110 THERAPEUTIC EXERCISES: CPT | Performed by: PHYSICAL THERAPIST

## 2017-07-05 NOTE — THERAPY PROGRESS REPORT/RE-CERT
Outpatient Physical Therapy Ortho Progress Note  [facility]     Patient Name: Antione Villaseñor  : 1995  MRN: [id number]  Today's Date: 2017      Visit Date: 2017    Visit 4/ (eval + 8)  0% improvement  MD KAPLAN   Recert date 17          Visit Dx:    ICD-10-CM ICD-9-CM   1. Midline low back pain with sciatica, sciatica laterality unspecified, unspecified chronicity M54.40 724.3   2. Intervertebral disc disorder with radiculopathy of lumbar region M51.16 724.4       Patient Active Problem List   Diagnosis   • Palpitations   • Pain in unspecified foot   • Male hypogonadism   • Low back pain   • Intervertebral disc disorders with radiculopathy, lumbar region   • Infertile male syndrome   • Heart murmur   • Encounter for general adult medical examination without abnormal findings   • Dysuria   • Depressive disorder   • Burning feet syndrome   • Bipolar disorder   • Ankle joint pain   • Memory loss   • Back pain   • Anxiety        Past Medical History:   Diagnosis Date   • Ankle joint pain    • Bipolar disorder    • Burning feet syndrome    • Depressive disorder    • Dysuria     Dysuria - OVER ACTIVE BLADDER VS POLYURIA      • Encounter for general adult medical examination without abnormal findings    • Heart murmur    • Infertile male syndrome    • Intervertebral disc disorders with radiculopathy, lumbar region    • Low back pain    • Male hypogonadism     Male hypogonadism - not receiving testosterone replacement by urology      • Pain in unspecified foot    • Palpitations         Past Surgical History:   Procedure Laterality Date   • INJECTION OF MEDICATION      Kenalog (1)      10/05/2015                 PT Ortho       17 1800    Sensation    Sensation WNL? WNL  -KW    Neural Tension Signs- Lower Quarter Clearing    SLR Bilateral:;Negative  -KW    Prone knee flexion Left:;Negative  -KW      User Key  (r) = Recorded By, (t) = Taken By, (c) = Cosigned By    Initials Name Provider  Type    KW Nancy Burns, PT Physical Therapist                            PT Assessment/Plan       07/05/17 1829       PT Assessment    Functional Limitations Performance in sport activities;Performance in leisure activities  -KW     Impairments Pain;Posture;Gait;Muscle strength  -KW     Assessment Comments Reviewed the importance of core strengthening  -KW     Please refer to paper survey for additional self-reported information No  -KW     Rehab Potential Good  -KW     Patient/caregiver participated in establishment of treatment plan and goals Yes  -KW     Patient would benefit from skilled therapy intervention Yes  -KW     PT Plan    PT Frequency 2x/week  -KW     Predicted Duration of Therapy Intervention (days/wks) 4 weeks  -KW     Planned CPT's? PT RE-EVAL: 70328;PT THER PROC EA 15 MIN: 36794;PT THER ACT EA 15 MIN: 72134;PT THER SUPP EA 15 MIN;PT MANUAL THERAPY EA 15 MIN: 29102;PT GAIT TRAINING EA 15 MIN: 58819;PT ELECTRICAL STIM UNATTEND: ;PT ULTRASOUND EA 15 MIN: 97834  -KW     Physical Therapy Interventions (Optional Details) lumbar stabilization;strengthening;stretching;manual therapy techniques;modalities;balance training;home exercise program;joint mobilization;postural re-education;patient/family education;gross motor skills;swiss ball techniques  -KW     PT Plan Comments Cont POC for sciatica  -KW       User Key  (r) = Recorded By, (t) = Taken By, (c) = Cosigned By    Initials Name Provider Type    LYNETTE Burns, PT Physical Therapist                Modalities       07/05/17 1800          Ice    Ice Applied Yes  -KW      Location lower back  -KW      Rx Minutes 15 mins  -KW      Ice S/P Rx Yes  -KW        User Key  (r) = Recorded By, (t) = Taken By, (c) = Cosigned By    Initials Name Provider Type    LYNETTE Burns, PT Physical Therapist                Exercises       07/05/17 1800          Subjective Comments    Subjective Comments Reports no pain today  -KW      Subjective Pain     Able to rate subjective pain? yes  -KW      Pre-Treatment Pain Level 0  -KW      Post-Treatment Pain Level 1  -KW      Aquatics    Aquatics performed? No  -KW      Exercise 1    Exercise Name 1 Pro II level 1 seat 9  -KW      Time (Minutes) 1 5  -KW      Exercise 2    Exercise Name 2 HS stretch   -KW      Reps 2 3  -KW      Time (Seconds) 2 30  -KW      Exercise 3    Exercise Name 3 St CR/TR   pain in ankle with CR  -KW      Sets 3 2  -KW      Reps 3 10  -KW      Exercise 4    Exercise Name 4 supine piriformis stretch  -KW      Reps 4 3  -KW      Time (Seconds) 4 30  -KW      Exercise 5    Exercise Name 5 LTR  -KW      Reps 5 10  -KW      Time (Seconds) 5 10  -KW      Exercise 6    Exercise Name 6 bridges  -KW      Sets 6 2  -KW      Reps 6 10  -KW      Exercise 7    Exercise Name 7 hooklying trans abs  -KW      Sets 7 2  -KW      Reps 7 10  -KW      Exercise 8    Exercise Name 8 prone alt LE lift   stopped due to increased LBP  -KW      Reps 8 5  -KW        User Key  (r) = Recorded By, (t) = Taken By, (c) = Cosigned By    Initials Name Provider Type    KW Nancy Burns, PT Physical Therapist                               PT OP Goals       07/05/17 1800       PT Short Term Goals    STG Date to Achieve 07/19/17  -KW     STG 1 Patient will be independent in a home exercise program  -KW     STG 1 Progress Not Met  -KW     STG 2 Patient will be able to stretch left hip flexor without back pain  -KW     STG 2 Progress Not Met  -KW     STG 3 Patient will maintain level sacral base  -KW     STG 3 Progress Not Met  -KW     STG 4 Patient will maintain equal leg length   -KW     STG 4 Progress Not Met  -KW     STG 5 Modified Oswestry score decreased to 6%  -KW     STG 5 Progress Not Met  -KW     STG 6 Patient report decreased left lower extremity symptoms  -KW     Long Term Goals    LTG Date to Achieve 07/26/17  -KW     LTG 1 Patient will be independent in final home exercise program  -KW     LTG 1 Progress Not Met  -KW      LTG 2 Patient will be able to report 50% improvement  -KW     LTG 2 Progress Not Met  -KW     Time Calculation    PT Goal Re-Cert Due Date 07/26/17  -KW       User Key  (r) = Recorded By, (t) = Taken By, (c) = Cosigned By    Initials Name Provider Type    LYNETTE Burns, PT Physical Therapist                Therapy Education       07/05/17 1828          Therapy Education    Given HEP  -KW      Program Reinforced  -KW      How Provided Verbal  -KW      Provided to Patient  -KW      Level of Understanding Verbalized  -KW        User Key  (r) = Recorded By, (t) = Taken By, (c) = Cosigned By    Initials Name Provider Type    LYNETTE Burns, PT Physical Therapist                Outcome Measures       07/05/17 1800          Modified Oswestry    Modified Oswestry Score/Comments 16%  -KW      Functional Assessment    Outcome Measure Options Modifed Owestry  -KW        User Key  (r) = Recorded By, (t) = Taken By, (c) = Cosigned By    Initials Name Provider Type    LYNETTE Burns, PT Physical Therapist            Time Calculation:   Start Time: 1730  Stop Time: 1825  Time Calculation (min): 55 min  Total Timed Code Minutes- PT: 55 minute(s)    Therapy Charges for Today     Code Description Service Date Service Provider Modifiers Qty    56949163355 HC PT THER SUPP EA 15 MIN 7/5/2017 Nancy Burns, PT GP 1    47736302504 HC PT THER PROC EA 15 MIN 7/5/2017 Nancy Burns, PT GP 3          PT G-Codes  Outcome Measure Options: Modifed Owestry         Nancy Burns, PT  7/5/2017

## 2017-07-06 ENCOUNTER — HOSPITAL ENCOUNTER (OUTPATIENT)
Dept: PHYSICAL THERAPY | Facility: HOSPITAL | Age: 22
Setting detail: THERAPIES SERIES
Discharge: HOME | End: 2017-07-06

## 2017-07-06 DIAGNOSIS — M51.16 INTERVERTEBRAL DISC DISORDER WITH RADICULOPATHY OF LUMBAR REGION: Primary | ICD-10-CM

## 2017-07-06 DIAGNOSIS — M54.40 MIDLINE LOW BACK PAIN WITH SCIATICA, SCIATICA LATERALITY UNSPECIFIED, UNSPECIFIED CHRONICITY: ICD-10-CM

## 2017-07-06 PROCEDURE — 97113 AQUATIC THERAPY/EXERCISES: CPT | Performed by: PHYSICAL THERAPIST

## 2017-07-06 PROCEDURE — 97110 THERAPEUTIC EXERCISES: CPT | Performed by: PHYSICAL THERAPIST

## 2017-07-06 NOTE — THERAPY TREATMENT NOTE
Outpatient Physical Therapy Ortho Treatment Note  [facility]     Patient Name: Antione Villaseñor  : 1995  MRN: [id number]  Today's Date: 2017      Visit Date: 2017      Visit 5/ (eval   + 8)  0% improvement  MD visit TBD  Recert date 17        Visit Dx:    ICD-10-CM ICD-9-CM   1. Intervertebral disc disorder with radiculopathy of lumbar region M51.16 724.4   2. Midline low back pain with sciatica, sciatica laterality unspecified, unspecified chronicity M54.40 724.3       Patient Active Problem List   Diagnosis   • Palpitations   • Pain in unspecified foot   • Male hypogonadism   • Low back pain   • Intervertebral disc disorders with radiculopathy, lumbar region   • Infertile male syndrome   • Heart murmur   • Encounter for general adult medical examination without abnormal findings   • Dysuria   • Depressive disorder   • Burning feet syndrome   • Bipolar disorder   • Ankle joint pain   • Memory loss   • Back pain   • Anxiety        Past Medical History:   Diagnosis Date   • Ankle joint pain    • Bipolar disorder    • Burning feet syndrome    • Depressive disorder    • Dysuria     Dysuria - OVER ACTIVE BLADDER VS POLYURIA      • Encounter for general adult medical examination without abnormal findings    • Heart murmur    • Infertile male syndrome    • Intervertebral disc disorders with radiculopathy, lumbar region    • Low back pain    • Male hypogonadism     Male hypogonadism - not receiving testosterone replacement by urology      • Pain in unspecified foot    • Palpitations         Past Surgical History:   Procedure Laterality Date   • INJECTION OF MEDICATION      Kenalog (1)      10/05/2015                 PT Ortho       17 1800    Sensation    Sensation WNL? WNL  -KW    Neural Tension Signs- Lower Quarter Clearing    SLR Bilateral:;Negative  -KW    Prone knee flexion Left:;Negative  -KW      User Key  (r) = Recorded By, (t) = Taken By, (c) = Cosigned By    Initials Name  Provider Type    KW Nancy Burns, PT Physical Therapist                            PT Assessment/Plan       07/06/17 1808 07/05/17 1829    PT Assessment    Functional Limitations  Performance in sport activities;Performance in leisure activities  -KW    Impairments  Pain;Posture;Gait;Muscle strength  -KW    Assessment Comments good toleratce to aqautics, no pain post exercises  -KW Reviewed the importance of core strengthening  -KW    Please refer to paper survey for additional self-reported information  No  -KW    Rehab Potential  Good  -KW    Patient/caregiver participated in establishment of treatment plan and goals  Yes  -KW    Patient would benefit from skilled therapy intervention  Yes  -KW    PT Plan    PT Frequency  2x/week  -KW    Predicted Duration of Therapy Intervention (days/wks)  4 weeks  -KW    Planned CPT's?  PT RE-EVAL: 61343;PT THER PROC EA 15 MIN: 00047;PT THER ACT EA 15 MIN: 80309;PT THER SUPP EA 15 MIN;PT MANUAL THERAPY EA 15 MIN: 89960;PT GAIT TRAINING EA 15 MIN: 93048;PT ELECTRICAL STIM UNATTEND: ;PT ULTRASOUND EA 15 MIN: 71410  -KW    Physical Therapy Interventions (Optional Details)  lumbar stabilization;strengthening;stretching;manual therapy techniques;modalities;balance training;home exercise program;joint mobilization;postural re-education;patient/family education;gross motor skills;swiss ball techniques  -KW    PT Plan Comments Cont POC for sciatica  -KW Cont POC for sciatica  -KW      User Key  (r) = Recorded By, (t) = Taken By, (c) = Cosigned By    Initials Name Provider Type    LYNETTE Burns, PT Physical Therapist                    Exercises       07/06/17 1700          Subjective Comments    Subjective Comments Reports increased pain in low back and both legs after yesterdays session. Also states he did not take his pain pills today  -KW      Subjective Pain    Able to rate subjective pain? yes  -KW      Pre-Treatment Pain Level 3  -KW      Post-Treatment Pain Level  2  -KW      Aquatics    Aquatics performed? Yes  -KW      Aquatics LE    Water Walk forward;side;backward  -KW      Vertical Traction 10 min  -KW      Clams 30x  -KW      Hip Abd/Add 30x  -KW      Hip Flex/Ext 30x  -KW      Bicycle 30x  -KW      Flutter/Scissor 30x  -KW      Exercise 1    Exercise Name 1 Pro II level 1 seat 9  -KW      Time (Minutes) 1 10  -KW        User Key  (r) = Recorded By, (t) = Taken By, (c) = Cosigned By    Initials Name Provider Type    KW Nancy Burns, PT Physical Therapist                               PT OP Goals       07/06/17 1800       PT Short Term Goals    STG Date to Achieve 07/19/17  -KW     STG 1 Patient will be independent in a home exercise program  -KW     STG 1 Progress Not Met  -KW     STG 2 Patient will be able to stretch left hip flexor without back pain  -KW     STG 2 Progress Not Met  -KW     STG 3 Patient will maintain level sacral base  -KW     STG 3 Progress Not Met  -KW     STG 4 Patient will maintain equal leg length   -KW     STG 4 Progress Not Met  -KW     STG 5 Modified Oswestry score decreased to 6%  -KW     STG 5 Progress Not Met  -KW     STG 6 Patient report decreased left lower extremity symptoms  -KW     Long Term Goals    LTG Date to Achieve 07/26/17  -KW     LTG 1 Patient will be independent in final home exercise program  -KW     LTG 1 Progress Not Met  -KW     LTG 2 Patient will be able to report 50% improvement  -KW     LTG 2 Progress Not Met  -KW     Time Calculation    PT Goal Re-Cert Due Date 07/26/17  -KW       User Key  (r) = Recorded By, (t) = Taken By, (c) = Cosigned By    Initials Name Provider Type    LYNETTE Burns, PT Physical Therapist                Therapy Education       07/06/17 1805 07/05/17 1828       Therapy Education    Given HEP  -KW HEP  -KW     Program Reinforced  -KW Reinforced  -KW     How Provided Verbal  -KW Verbal  -KW     Provided to Patient  -KW Patient  -KW     Level of Understanding Verbalized  -KW Verbalized   -LYNETTE       User Key  (r) = Recorded By, (t) = Taken By, (c) = Cosigned By    Initials Name Provider Type    LYNETTE Burns, PT Physical Therapist                Outcome Measures       07/05/17 1800          Modified Oswestry    Modified Oswestry Score/Comments 16%  -KW      Functional Assessment    Outcome Measure Options Modifed Owestry  -KW        User Key  (r) = Recorded By, (t) = Taken By, (c) = Cosigned By    Initials Name Provider Type    LYNETTE Burns PT Physical Therapist            Time Calculation:   Start Time: 1715  Stop Time: 1800  Time Calculation (min): 45 min  Total Timed Code Minutes- PT: 45 minute(s)    Therapy Charges for Today     Code Description Service Date Service Provider Modifiers Qty    53250896485 HC PT AQUATIC THERAPY EA 15 MIN 7/6/2017 Nancy Burns, PT GP 2    99320999599 HC PT THER PROC EA 15 MIN 7/6/2017 Nancy Burns, PT GP 1                    Nancy Burns, PT  7/6/2017

## 2017-07-10 ENCOUNTER — HOSPITAL ENCOUNTER (OUTPATIENT)
Dept: PHYSICAL THERAPY | Facility: HOSPITAL | Age: 22
Setting detail: THERAPIES SERIES
Discharge: HOME | End: 2017-07-10

## 2017-07-10 DIAGNOSIS — M51.16 INTERVERTEBRAL DISC DISORDER WITH RADICULOPATHY OF LUMBAR REGION: Primary | ICD-10-CM

## 2017-07-10 DIAGNOSIS — M54.40 MIDLINE LOW BACK PAIN WITH SCIATICA, SCIATICA LATERALITY UNSPECIFIED, UNSPECIFIED CHRONICITY: ICD-10-CM

## 2017-07-10 PROCEDURE — 97012 MECHANICAL TRACTION THERAPY: CPT | Performed by: PHYSICAL THERAPIST

## 2017-07-10 PROCEDURE — 97110 THERAPEUTIC EXERCISES: CPT | Performed by: PHYSICAL THERAPIST

## 2017-07-10 NOTE — THERAPY TREATMENT NOTE
Outpatient Physical Therapy Ortho Treatment Note  [facility]     Patient Name: Antione Villaseñor  : 1995  MRN: [id number]  Today's Date: 7/10/2017      Visit Date: 07/10/2017        Visit  / (eval + 8) 3 visits remaining  0% improvement  MD KAPLAN  Recert date 17          Visit Dx:    ICD-10-CM ICD-9-CM   1. Intervertebral disc disorder with radiculopathy of lumbar region M51.16 724.4   2. Midline low back pain with sciatica, sciatica laterality unspecified, unspecified chronicity M54.40 724.3       Patient Active Problem List   Diagnosis   • Palpitations   • Pain in unspecified foot   • Male hypogonadism   • Low back pain   • Intervertebral disc disorders with radiculopathy, lumbar region   • Infertile male syndrome   • Heart murmur   • Encounter for general adult medical examination without abnormal findings   • Dysuria   • Depressive disorder   • Burning feet syndrome   • Bipolar disorder   • Ankle joint pain   • Memory loss   • Back pain   • Anxiety        Past Medical History:   Diagnosis Date   • Ankle joint pain    • Bipolar disorder    • Burning feet syndrome    • Depressive disorder    • Dysuria     Dysuria - OVER ACTIVE BLADDER VS POLYURIA      • Encounter for general adult medical examination without abnormal findings    • Heart murmur    • Infertile male syndrome    • Intervertebral disc disorders with radiculopathy, lumbar region    • Low back pain    • Male hypogonadism     Male hypogonadism - not receiving testosterone replacement by urology      • Pain in unspecified foot    • Palpitations         Past Surgical History:   Procedure Laterality Date   • INJECTION OF MEDICATION      Kenalog (1)      10/05/2015                                 PT Assessment/Plan       07/10/17 1811       PT Assessment    Assessment Comments pain during ther ex today  -KW     PT Plan    PT Plan Comments next visit pool for LBP  -KW       User Key  (r) = Recorded By, (t) = Taken By, (c) = Cosigned By     Initials Name Provider Type    LYNETTE Burns, PT Physical Therapist                    Exercises       07/10/17 1700          Subjective Comments    Subjective Comments Reports session in the pool helped with pain  -KW      Subjective Pain    Able to rate subjective pain? yes  -KW      Pre-Treatment Pain Level 1  -KW      Post-Treatment Pain Level 1  -KW      Exercise 1    Exercise Name 1 Pro II level 2.5  -KW      Time (Minutes) 1 10  -KW      Exercise 2    Exercise Name 2  bilat 4 way SLR  -KW      Sets 2 3  -KW      Reps 2 10  -KW      Exercise 3    Exercise Name 3 traction machine  -KW      Time (Minutes) 3 20  -KW        User Key  (r) = Recorded By, (t) = Taken By, (c) = Cosigned By    Initials Name Provider Type    LYNETTE Burns, PT Physical Therapist                               PT OP Goals       07/10/17 1700       PT Short Term Goals    STG Date to Achieve 07/19/17  -KW     STG 1 Patient will be independent in a home exercise program  -KW     STG 1 Progress Not Met  -KW     STG 2 Patient will be able to stretch left hip flexor without back pain  -KW     STG 2 Progress Not Met  -KW     STG 3 Patient will maintain level sacral base  -KW     STG 3 Progress Not Met  -KW     STG 4 Patient will maintain equal leg length   -KW     STG 4 Progress Not Met  -KW     STG 5 Modified Oswestry score decreased to 6%  -KW     STG 5 Progress Not Met  -KW     STG 6 Patient report decreased left lower extremity symptoms  -KW     Long Term Goals    LTG Date to Achieve 07/26/17  -KW     LTG 1 Patient will be independent in final home exercise program  -KW     LTG 1 Progress Not Met  -KW     LTG 2 Patient will be able to report 50% improvement  -KW     LTG 2 Progress Not Met  -KW     Time Calculation    PT Goal Re-Cert Due Date 07/26/17  -KW       User Key  (r) = Recorded By, (t) = Taken By, (c) = Cosigned By    Initials Name Provider Type    LYNETTE Burns, PT Physical Therapist                Therapy  Education       07/10/17 1730          Therapy Education    Given HEP  -KW      Program Reinforced  -KW      How Provided Verbal  -KW      Provided to Patient  -KW      Level of Understanding Verbalized  -KW        User Key  (r) = Recorded By, (t) = Taken By, (c) = Cosigned By    Initials Name Provider Type    LYNETTE Burns, PT Physical Therapist                Time Calculation:   Start Time: 1730  Stop Time: 1819  Time Calculation (min): 49 min  Total Timed Code Minutes- PT: 49 minute(s)    Therapy Charges for Today     Code Description Service Date Service Provider Modifiers Qty    70744674835  PT THER PROC EA 15 MIN 7/10/2017 Nancy Burns, PT GP 2    74398232409 HC PT TRACTION LUMBAR 7/10/2017 Nancy Burns, PT GP 1                    Nancy Burns, PT  7/10/2017

## 2017-07-12 ENCOUNTER — HOSPITAL ENCOUNTER (OUTPATIENT)
Dept: PHYSICAL THERAPY | Facility: HOSPITAL | Age: 22
Setting detail: THERAPIES SERIES
Discharge: HOME | End: 2017-07-12

## 2017-07-12 DIAGNOSIS — M54.40 MIDLINE LOW BACK PAIN WITH SCIATICA, SCIATICA LATERALITY UNSPECIFIED, UNSPECIFIED CHRONICITY: ICD-10-CM

## 2017-07-12 DIAGNOSIS — M51.16 INTERVERTEBRAL DISC DISORDER WITH RADICULOPATHY OF LUMBAR REGION: Primary | ICD-10-CM

## 2017-07-12 PROCEDURE — 97113 AQUATIC THERAPY/EXERCISES: CPT | Performed by: PHYSICAL THERAPIST

## 2017-07-12 NOTE — THERAPY DISCHARGE NOTE
Outpatient Physical Therapy Ortho Treatment Note/Discharge Summary  [facility]     Patient Name: Antione Villaseñor  : 1995  MRN: [id number]  Today's Date: 2017      Visit Date: 2017      Visit   D/C from PT w/ [employer] and [employer]          Visit Dx:    ICD-10-CM ICD-9-CM   1. Intervertebral disc disorder with radiculopathy of lumbar region M51.16 724.4   2. Midline low back pain with sciatica, sciatica laterality unspecified, unspecified chronicity M54.40 724.3       Patient Active Problem List   Diagnosis   • Palpitations   • Pain in unspecified foot   • Male hypogonadism   • Low back pain   • Intervertebral disc disorders with radiculopathy, lumbar region   • Infertile male syndrome   • Heart murmur   • Encounter for general adult medical examination without abnormal findings   • Dysuria   • Depressive disorder   • Burning feet syndrome   • Bipolar disorder   • Ankle joint pain   • Memory loss   • Back pain   • Anxiety        Past Medical History:   Diagnosis Date   • Ankle joint pain    • Bipolar disorder    • Burning feet syndrome    • Depressive disorder    • Dysuria     Dysuria - OVER ACTIVE BLADDER VS POLYURIA      • Encounter for general adult medical examination without abnormal findings    • Heart murmur    • Infertile male syndrome    • Intervertebral disc disorders with radiculopathy, lumbar region    • Low back pain    • Male hypogonadism     Male hypogonadism - not receiving testosterone replacement by urology      • Pain in unspecified foot    • Palpitations         Past Surgical History:   Procedure Laterality Date   • INJECTION OF MEDICATION      Kenalog (1)      10/05/2015                                 PT Assessment/Plan       17 1847       PT Assessment    Assessment Comments pool helps some  -KW     PT Plan    PT Plan Comments last visit today D/C to [employer]  -KW       User Key  (r) = Recorded By, (t) = Taken By, (c) = Cosigned By     Initials Name Provider Type    KW Nancy Burns, PT Physical Therapist                    Exercises       07/12/17 1800          Subjective Comments    Subjective Comments Reports today his last day as he will get MRI next week   -KW      Subjective Pain    Able to rate subjective pain? yes  -KW      Pre-Treatment Pain Level 1  -KW      Post-Treatment Pain Level 1  -KW      Aquatics    Aquatics performed? Yes  -KW      Aquatics LE    Water Walk forward;side;backward  -KW      Vertical Traction 10 min  -KW      Clams 30x  -KW      Hip Abd/Add 30x  -KW      Hip Flex/Ext 30x  -KW      Bicycle 30x  -KW      Flutter/Scissor 30x  -KW      Exercise 1    Exercise Name 1 Pro II level 2.5  -KW      Time (Minutes) 1 10  -KW      Exercise 2    Exercise Name 2  bilat 4 way SLR  -KW      Sets 2 3  -KW      Reps 2 10  -KW      Time (Seconds) 2 30  -KW      Exercise 3    Exercise Name 3 traction machine  -KW      Sets 3 2  -KW      Reps 3 10  -KW      Time (Minutes) 3 20  -KW      Exercise 4    Exercise Name 4 supine piriformis stretch  -KW      Reps 4 3  -KW      Time (Seconds) 4 30  -KW      Exercise 5    Exercise Name 5 LTR  -KW      Reps 5 10  -KW      Time (Seconds) 5 10  -KW      Exercise 6    Exercise Name 6 bridges  -KW      Sets 6 2  -KW      Reps 6 10  -KW      Exercise 7    Exercise Name 7 hooklying trans abs  -KW      Sets 7 2  -KW      Reps 7 10  -KW      Exercise 8    Exercise Name 8 prone alt LE lift   stopped due to increased LBP  -KW      Reps 8 5  -KW        User Key  (r) = Recorded By, (t) = Taken By, (c) = Cosigned By    Initials Name Provider Type    KW Nancy Burns, PT Physical Therapist                               PT OP Goals       07/12/17 1800       PT Short Term Goals    STG Date to Achieve 07/19/17  -KW     STG 1 Patient will be independent in a home exercise program  -KW     STG 1 Progress Not Met  -KW     STG 2 Patient will be able to stretch left hip flexor without back pain  -KW     STG 2  Progress Not Met  -KW     STG 3 Patient will maintain level sacral base  -KW     STG 3 Progress Not Met  -KW     STG 4 Patient will maintain equal leg length   -KW     STG 4 Progress Not Met  -KW     STG 5 Modified Oswestry score decreased to 6%  -KW     STG 5 Progress Not Met  -KW     STG 6 Patient report decreased left lower extremity symptoms  -KW     Long Term Goals    LTG Date to Achieve 07/26/17  -KW     LTG 1 Patient will be independent in final home exercise program  -KW     LTG 1 Progress Not Met  -KW     LTG 2 Patient will be able to report 50% improvement  -KW     LTG 2 Progress Not Met  -KW     Time Calculation    PT Goal Re-Cert Due Date 07/26/17  -KW       User Key  (r) = Recorded By, (t) = Taken By, (c) = Cosigned By    Initials Name Provider Type    LYNETTE Burns, JACOB Physical Therapist                Therapy Education       07/12/17 1847          Therapy Education    Given HEP  -KW      Program Reinforced  -KW      How Provided Verbal  -KW      Provided to Patient  -KW      Level of Understanding Verbalized  -KW        User Key  (r) = Recorded By, (t) = Taken By, (c) = Cosigned By    Initials Name Provider Type    LYNETTE Burns, PT Physical Therapist                Time Calculation:   Start Time: 1730  Stop Time: 1815  Time Calculation (min): 45 min  Total Timed Code Minutes- PT: 45 minute(s)    Therapy Charges for Today     Code Description Service Date Service Provider Modifiers Qty    17530444925 HC PT AQUATIC THERAPY EA 15 MIN 7/12/2017 Nancy Burns PT GP 3                OP PT Discharge Summary  Date of Discharge: 07/12/17  Reason for Discharge: Patient/Caregiver request  Outcomes Achieved: Patient able to partially acheive established goals  Discharge Destination: Home with home program  Discharge Instructions: D/C to [employer]      Nancy Burns, PT  7/12/2017

## 2017-07-19 ENCOUNTER — OFFICE VISIT (OUTPATIENT)
Dept: ORTHOPEDIC SURGERY | Facility: CLINIC | Age: 22
End: 2017-07-19

## 2017-07-19 VITALS — WEIGHT: 173 LBS | HEIGHT: 70 IN | BODY MASS INDEX: 24.77 KG/M2

## 2017-07-19 DIAGNOSIS — M54.5 ACUTE LOW BACK PAIN, UNSPECIFIED BACK PAIN LATERALITY, WITH SCIATICA PRESENCE UNSPECIFIED: ICD-10-CM

## 2017-07-19 DIAGNOSIS — M51.16 INTERVERTEBRAL DISC DISORDERS WITH RADICULOPATHY, LUMBAR REGION: Primary | ICD-10-CM

## 2017-07-19 PROCEDURE — 99213 OFFICE O/P EST LOW 20 MIN: CPT | Performed by: NURSE PRACTITIONER

## 2017-07-19 NOTE — PROGRESS NOTES
"Antione Villaseñor is a 21 y.o. male returns for     Chief Complaint   Patient presents with   • Lumbar Spine - Follow-up       HISTORY OF PRESENT ILLNESS: Patient has finished a round of PT.       CONCURRENT MEDICAL HISTORY:    Past Medical History:   Diagnosis Date   • Ankle joint pain    • Bipolar disorder    • Burning feet syndrome    • Depressive disorder    • Dysuria     Dysuria - OVER ACTIVE BLADDER VS POLYURIA      • Encounter for general adult medical examination without abnormal findings    • Heart murmur    • Infertile male syndrome    • Intervertebral disc disorders with radiculopathy, lumbar region    • Low back pain    • Male hypogonadism     Male hypogonadism - not receiving testosterone replacement by urology      • Pain in unspecified foot    • Palpitations        No Known Allergies      Current Outpatient Prescriptions:   •  buPROPion XL (WELLBUTRIN XL) 150 MG 24 hr tablet, Take 1 tablet by mouth Daily., Disp: 30 tablet, Rfl: 0  •  meloxicam (MOBIC) 15 MG tablet, Take 1 tablet by mouth Daily., Disp: 30 tablet, Rfl: 3    Past Surgical History:   Procedure Laterality Date   • INJECTION OF MEDICATION      Kenalog (1)      10/05/2015           ROS  No fevers or chills.  No chest pain or shortness of air.  No GI or  disturbances.    PHYSICAL EXAMINATION:       Ht 70\" (177.8 cm)  Wt 173 lb (78.5 kg)  BMI 24.82 kg/m2    Physical Exam   Constitutional: He is oriented to person, place, and time. Vital signs are normal. He appears well-developed and well-nourished. He is cooperative.   HENT:   Head: Normocephalic and atraumatic.   Neck: Trachea normal and phonation normal.   Pulmonary/Chest: Effort normal. No respiratory distress.   Abdominal: Soft. Normal appearance. He exhibits no distension.   Neurological: He is alert and oriented to person, place, and time. GCS eye subscore is 4. GCS verbal subscore is 5. GCS motor subscore is 6.   Skin: Skin is warm, dry and intact.   Psychiatric: He has a normal " mood and affect. His speech is normal and behavior is normal. Judgment and thought content normal. Cognition and memory are normal.   Vitals reviewed.      GAIT:     []  Normal  []  Antalgic    Assistive device: []  None  []  Walker     []  Crutches  []  Cane     []  Wheelchair  []  Stretcher    Back Exam     Tenderness   The patient is experiencing tenderness in the sacroiliac and lumbar.    Range of Motion   Extension: abnormal   Flexion: abnormal   Lateral Bend Right: abnormal   Lateral Bend Left: abnormal   Rotation Right: abnormal   Rotation Left: abnormal     Muscle Strength   Right Quadriceps:  4/5   Left Quadriceps:  4/5   Right Hamstrings:  4/5   Left Hamstrings:  4/5     Tests   Straight leg raise right: negative  Straight leg raise left: positive    Reflexes   Patellar: normal  Achilles: abnormal    Other   Toe Walk: normal  Heel Walk: normal  Sensation: normal  Gait: normal   Erythema: no back redness  Scars: absent              No results found.          ASSESSMENT:    Diagnoses and all orders for this visit:    Intervertebral disc disorders with radiculopathy, lumbar region  -     MRI Lumbar Spine Without Contrast; Future    Acute low back pain, unspecified back pain laterality, with sciatica presence unspecified  -     MRI Lumbar Spine Without Contrast; Future          PLAN  Recommend MRI of the lumbar spine for further evaluation of symptoms.   No Follow-up on file.    LISBETH Serna-

## 2017-07-24 ENCOUNTER — OFFICE VISIT (OUTPATIENT)
Dept: FAMILY MEDICINE CLINIC | Facility: CLINIC | Age: 22
End: 2017-07-24

## 2017-07-24 VITALS
BODY MASS INDEX: 24.48 KG/M2 | HEIGHT: 70 IN | DIASTOLIC BLOOD PRESSURE: 70 MMHG | OXYGEN SATURATION: 97 % | SYSTOLIC BLOOD PRESSURE: 122 MMHG | WEIGHT: 171 LBS | HEART RATE: 81 BPM

## 2017-07-24 DIAGNOSIS — F41.9 ANXIETY: Primary | ICD-10-CM

## 2017-07-24 PROCEDURE — 99213 OFFICE O/P EST LOW 20 MIN: CPT | Performed by: FAMILY MEDICINE

## 2017-07-24 RX ORDER — HYDROXYZINE PAMOATE 25 MG/1
25 CAPSULE ORAL 3 TIMES DAILY PRN
Qty: 90 CAPSULE | Refills: 3 | Status: SHIPPED | OUTPATIENT
Start: 2017-07-24 | End: 2018-04-06 | Stop reason: SINTOL

## 2017-07-24 RX ORDER — BUPROPION HYDROCHLORIDE 150 MG/1
150 TABLET ORAL DAILY
Qty: 30 TABLET | Refills: 3 | Status: SHIPPED | OUTPATIENT
Start: 2017-07-24 | End: 2017-08-17 | Stop reason: DRUGHIGH

## 2017-07-24 RX ORDER — MELOXICAM 15 MG/1
15 TABLET ORAL DAILY
Qty: 30 TABLET | Refills: 6 | Status: SHIPPED | OUTPATIENT
Start: 2017-07-24 | End: 2017-08-28 | Stop reason: SDUPTHER

## 2017-07-24 NOTE — PROGRESS NOTES
Subjective:     Antione Villaseñor is a 21 y.o. male who presents for follow up for Anxiety.    Anxiety:   At the last visit, patient wanted to quit the Wellbutrin. He tried to come off of this and had increase of his anxiety. He tried Buspar, which made his anxiety worse. He was seeing a counselor in past, but has not seen a counselor in 6 months. He feels anxious. He feels his overall anxiety is reduced on the Wellbutrin. He has anxiety attacks 3-4 times per week.  He does deep breathing exercises which seems to help. The anxiety attacks seem to last 2-3 minutes.     Preventative:  Over the past 2 weeks, have you felt down, depressed, or hopeless?No   Over the past 2 weeks, have you felt little interest or pleasure in doing things?Yes  Clinical depression screening refused by patient.No     On osteoporosis therapy?Not Indicated     Past Medical Hx:  Past Medical History:   Diagnosis Date   • Ankle joint pain    • Bipolar disorder    • Burning feet syndrome    • Depressive disorder    • Dysuria     Dysuria - OVER ACTIVE BLADDER VS POLYURIA      • Encounter for general adult medical examination without abnormal findings    • Heart murmur    • Infertile male syndrome    • Intervertebral disc disorders with radiculopathy, lumbar region    • Low back pain    • Male hypogonadism     Male hypogonadism - not receiving testosterone replacement by urology      • Pain in unspecified foot    • Palpitations        Past Surgical Hx:  Past Surgical History:   Procedure Laterality Date   • INJECTION OF MEDICATION      Kenalog (1)      10/05/2015           Health Maintenance:  Health Maintenance   Topic Date Due   • INFLUENZA VACCINE  08/01/2017   • TDAP/TD VACCINES (2 - Td) 01/01/2021       Current Meds:    Current Outpatient Prescriptions:   •  buPROPion XL (WELLBUTRIN XL) 150 MG 24 hr tablet, Take 1 tablet by mouth Daily., Disp: 30 tablet, Rfl: 3  •  hydrOXYzine (VISTARIL) 25 MG capsule, Take 1 capsule by mouth 3 (Three)  "Times a Day As Needed for Itching., Disp: 90 capsule, Rfl: 3  •  meloxicam (MOBIC) 15 MG tablet, Take 1 tablet by mouth Daily., Disp: 30 tablet, Rfl: 6    Allergies:  Review of patient's allergies indicates no known allergies.    Family Hx:  Family History   Problem Relation Age of Onset   • Asthma Other    • Diabetes Other    • Heart disease Other    • Hypertension Other    • Lung disease Other    • Coronary artery disease Father    • Hypertension Father         Social History:  Social History     Social History   • Marital status:      Spouse name: N/A   • Number of children: N/A   • Years of education: N/A     Occupational History   • Not on file.     Social History Main Topics   • Smoking status: Never Smoker   • Smokeless tobacco: Never Used   • Alcohol use Yes      Comment: 3-4 beers 2 times per month.   • Drug use: No   • Sexual activity: Yes     Partners: Female     Other Topics Concern   • Not on file     Social History Narrative       Review of Systems  Review of Systems   Constitutional: Negative for chills and fever.   HENT: Negative for congestion, ear pain, hearing loss, rhinorrhea, sore throat and trouble swallowing.    Eyes: Negative for pain and visual disturbance.   Respiratory: Negative for cough and shortness of breath.    Cardiovascular: Negative for chest pain and palpitations.   Gastrointestinal: Negative for abdominal pain, constipation, diarrhea, nausea and vomiting.   Genitourinary: Negative for dysuria and hematuria.   Musculoskeletal: Positive for back pain. Negative for neck pain.   Skin: Negative for rash and wound.   Neurological: Negative for seizures and headaches.   Psychiatric/Behavioral: Negative for dysphoric mood and sleep disturbance. The patient is nervous/anxious.        Objective:     /70  Pulse 81  Ht 70\" (177.8 cm)  Wt 171 lb (77.6 kg)  SpO2 97%  BMI 24.54 kg/m2   Physical Exam   Constitutional: He is oriented to person, place, and time. He appears " well-developed and well-nourished.   HENT:   Head: Normocephalic and atraumatic.   Mouth/Throat: Oropharynx is clear and moist.   Eyes: Conjunctivae and EOM are normal. Pupils are equal, round, and reactive to light.   Neck: Normal range of motion. Neck supple. No tracheal deviation present. No thyromegaly present.   Cardiovascular: Normal rate, regular rhythm and normal heart sounds.  Exam reveals no gallop and no friction rub.    No murmur heard.  Pulmonary/Chest: Effort normal and breath sounds normal. No respiratory distress. He has no wheezes. He has no rales.   Abdominal: Soft. Bowel sounds are normal. He exhibits no distension. There is no tenderness.   Musculoskeletal: Normal range of motion. He exhibits no tenderness.   Lymphadenopathy:     He has no cervical adenopathy.   Neurological: He is alert and oriented to person, place, and time. No cranial nerve deficit.   Skin: Skin is warm and dry. No rash noted. No erythema.   Psychiatric: He has a normal mood and affect. His behavior is normal. Thought content normal.   Vitals reviewed.    Assessment/Plan:     1. Michael Talbot was seen today for anxiety and follow-up.    Diagnoses and all orders for this visit:    Anxiety: Patient feels that the Wellbutrin is doing well for him. We discussed the possibility of increasing the dosage of the Wellbutrin, but patient does not want to try this at this time. I discussed with the patient whether he would like to see counseling. He declined this. I discussed trying to switch to a different class instead of a SSRI. Patient does not want to try this at this time. I discussed trying Vistaril. Patient wants to try this. I discussed that this medication can make some people tired. I instructed him to try the medication for the first time when he would not be operating heavy machinery or driving. Patient gave verbal understanding and agreement with this plan. All questions were answered.   -     hydrOXYzine  (VISTARIL) 25 MG capsule; Take 1 capsule by mouth 3 (Three) Times a Day As Needed for Itching.  -     buPROPion XL (WELLBUTRIN XL) 150 MG 24 hr tablet; Take 1 tablet by mouth Daily.  Lower Back Pain  -     meloxicam (MOBIC) 15 MG tablet; Take 1 tablet by mouth Daily.      Follow-up:     Return in about 4 weeks (around 8/21/2017).        Preventative:  Male Preventative: Exercises regularly  Vaccines:   Tetanus vaccine: up to date  Annual influenza vaccine: not up to date - Patient refuses this    Pneumococcal vaccine: not up to date - Not indicated at this time.   Hep B vaccine: not up to date - Not indicated at this time.    Zoster vaccine:not up to date - Not indicated at this time.     Patient does not smoke.   occasional/rare  eat more fruits and vegetables, decrease soda or juice intake and increase physical activity    RISK SCORE: 2              This document has been electronically signed by Memo Hall MD on July 24, 2017 11:05 AM

## 2017-07-25 NOTE — PROGRESS NOTES
I have reviewed the notes, assessments, and/or procedures performed. I concur with her/his documentation of Antione Villaseñor.     Deonte Morataya, DO

## 2017-08-02 ENCOUNTER — TELEPHONE (OUTPATIENT)
Dept: ORTHOPEDIC SURGERY | Facility: CLINIC | Age: 22
End: 2017-08-02

## 2017-08-02 DIAGNOSIS — M54.5 LOW BACK PAIN, UNSPECIFIED BACK PAIN LATERALITY, UNSPECIFIED CHRONICITY, WITH SCIATICA PRESENCE UNSPECIFIED: Primary | ICD-10-CM

## 2017-08-02 NOTE — TELEPHONE ENCOUNTER
----- Message from LISBETH Castillo sent at 8/1/2017  3:08 PM CDT -----  Ok put him in PT for one month and have them collect that valuable data  ----- Message -----     From: Ileana Salcedo MA     Sent: 8/1/2017   2:49 PM       To: LISBETH Castillo        ----- Message -----     From: Charissa Phillip     Sent: 8/1/2017   1:52 PM       To: [facility]    Tyler,    Patient's insurance has denied MRI lumbar. Denial letter states, mri lumbar is helpful when someone has narrowing around the spinal cord. We need to see if your pain changes with walking or bending. We need to see that the pain keeps you form doing normal things (bathing, dressing, walking, etc) each day.    -Charissa

## 2017-08-17 ENCOUNTER — OFFICE VISIT (OUTPATIENT)
Dept: FAMILY MEDICINE CLINIC | Facility: CLINIC | Age: 22
End: 2017-08-17

## 2017-08-17 VITALS
DIASTOLIC BLOOD PRESSURE: 70 MMHG | HEIGHT: 70 IN | WEIGHT: 173.19 LBS | OXYGEN SATURATION: 100 % | HEART RATE: 90 BPM | BODY MASS INDEX: 24.79 KG/M2 | SYSTOLIC BLOOD PRESSURE: 128 MMHG

## 2017-08-17 DIAGNOSIS — F41.9 ANXIETY: ICD-10-CM

## 2017-08-17 DIAGNOSIS — J06.9 VIRAL URI WITH COUGH: Primary | ICD-10-CM

## 2017-08-17 PROCEDURE — 99213 OFFICE O/P EST LOW 20 MIN: CPT | Performed by: FAMILY MEDICINE

## 2017-08-17 RX ORDER — BUPROPION HYDROCHLORIDE 150 MG/1
150 TABLET ORAL 2 TIMES DAILY
Qty: 60 TABLET | Refills: 3 | Status: SHIPPED | OUTPATIENT
Start: 2017-08-17 | End: 2017-08-22 | Stop reason: ALTCHOICE

## 2017-08-17 RX ORDER — ALBUTEROL SULFATE 90 UG/1
2 AEROSOL, METERED RESPIRATORY (INHALATION) EVERY 4 HOURS PRN
Qty: 1 INHALER | Refills: 3 | Status: SHIPPED | OUTPATIENT
Start: 2017-08-17 | End: 2018-04-06 | Stop reason: SINTOL

## 2017-08-17 NOTE — PROGRESS NOTES
Subjective:     Antione Villaseñor is a 21 y.o. male who presents for follow up for anxiety.    Anxiety:       Patient has been taking Wellbutrin Xl 150 mg daily. He feels that this helps his anxiety. He feels that he needs to increase his dose to BID as this seems to have leveled off the amount this helps. I prescribed Vistaril. He took this for 2-3 days, but feels that this made him have a short temper.      Cough:   He has had a cough for 1 week. This productive of yellow mucus. He has mild SOA. He denies fever or chills. He has nasal congestion. He has runny nose.He has tried saline nose spray with little improvement. He has taken Mucinex that did not seem to help. He feels he has had a sick contact at work. 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 This seems to be getting worse.     Preventative:  Over the past 2 weeks, have you felt down, depressed, or hopeless?No   Over the past 2 weeks, have you felt little interest or pleasure in doing things?No  Clinical depression screening refused by patient.No     On osteoporosis therapy?No     Past Medical Hx:  Past Medical History:   Diagnosis Date   • Ankle joint pain    • Bipolar disorder    • Burning feet syndrome    • Depressive disorder    • Dysuria     Dysuria - OVER ACTIVE BLADDER VS POLYURIA      • Encounter for general adult medical examination without abnormal findings    • Heart murmur    • Infertile male syndrome    • Intervertebral disc disorders with radiculopathy, lumbar region    • Low back pain    • Male hypogonadism     Male hypogonadism - not receiving testosterone replacement by urology      • Pain in unspecified foot    • Palpitations        Past Surgical Hx:  Past Surgical History:   Procedure Laterality Date   • INJECTION OF MEDICATION      Kenalog (1)      10/05/2015           Health Maintenance:  Health Maintenance   Topic Date Due   • INFLUENZA VACCINE  09/01/2017   • TDAP/TD VACCINES (2 - Td) 01/01/2021       Current  Meds:    Current Outpatient Prescriptions:   •  hydrOXYzine (VISTARIL) 25 MG capsule, Take 1 capsule by mouth 3 (Three) Times a Day As Needed for Itching., Disp: 90 capsule, Rfl: 3  •  meloxicam (MOBIC) 15 MG tablet, Take 1 tablet by mouth Daily., Disp: 30 tablet, Rfl: 6  •  albuterol (PROVENTIL HFA;VENTOLIN HFA) 108 (90 Base) MCG/ACT inhaler, Inhale 2 puffs Every 4 (Four) Hours As Needed for Wheezing., Disp: 1 inhaler, Rfl: 3  •  buPROPion XL (WELLBUTRIN XL) 150 MG 24 hr tablet, Take 1 tablet by mouth 2 (Two) Times a Day., Disp: 60 tablet, Rfl: 3    Allergies:  Review of patient's allergies indicates no known allergies.    Family Hx:  Family History   Problem Relation Age of Onset   • Asthma Other    • Diabetes Other    • Heart disease Other    • Hypertension Other    • Lung disease Other    • Coronary artery disease Father    • Hypertension Father         Social History:  Social History     Social History   • Marital status:      Spouse name: N/A   • Number of children: N/A   • Years of education: N/A     Occupational History   • Not on file.     Social History Main Topics   • Smoking status: Never Smoker   • Smokeless tobacco: Never Used   • Alcohol use Yes      Comment: 3-4 beers 2 times per month.   • Drug use: No   • Sexual activity: Yes     Partners: Female     Other Topics Concern   • Not on file     Social History Narrative       Review of Systems  Review of Systems   Constitutional: Negative for appetite change, chills and fever.   HENT: Positive for congestion and rhinorrhea. Negative for ear pain, hearing loss, sore throat and trouble swallowing.    Eyes: Negative for pain and visual disturbance.   Respiratory: Positive for cough and shortness of breath.    Cardiovascular: Positive for palpitations. Negative for chest pain.   Gastrointestinal: Negative for abdominal pain, constipation, diarrhea, nausea and vomiting.   Genitourinary: Negative for dysuria and hematuria.   Musculoskeletal: Negative  "for back pain and neck pain.   Skin: Negative for rash and wound.   Neurological: Negative for seizures and headaches.   Psychiatric/Behavioral: Negative for dysphoric mood and sleep disturbance. The patient is nervous/anxious.        Objective:     /70 (BP Location: Left arm, Patient Position: Sitting, Cuff Size: Adult)  Pulse 90  Ht 70\" (177.8 cm)  Wt 173 lb 3 oz (78.6 kg)  SpO2 100%  BMI 24.85 kg/m2   Physical Exam   Constitutional: He is oriented to person, place, and time. He appears well-developed and well-nourished.   HENT:   Head: Normocephalic and atraumatic.   Mouth/Throat: Oropharynx is clear and moist.   Eyes: Conjunctivae and EOM are normal. Pupils are equal, round, and reactive to light.   Neck: Normal range of motion. Neck supple. No tracheal deviation present. No thyromegaly present.   Cardiovascular: Normal rate, regular rhythm and normal heart sounds.  Exam reveals no gallop and no friction rub.    No murmur heard.  Pulmonary/Chest: Effort normal and breath sounds normal. No respiratory distress. He has no wheezes. He has no rales.   Abdominal: Soft. Bowel sounds are normal. He exhibits no distension. There is no tenderness.   Musculoskeletal: He exhibits no tenderness.   Pain has mild decreased ROM in lower back due to pain. He has tenderness in over his lumbar spine.    Lymphadenopathy:     He has no cervical adenopathy.   Neurological: He is alert and oriented to person, place, and time. No cranial nerve deficit.   Skin: Skin is warm and dry. No rash noted. No erythema.   Psychiatric: He has a normal mood and affect. His behavior is normal. Thought content normal.   Vitals reviewed.    Assessment/Plan:     1. Viral URI with cough    2. Anxiety         Antione was seen today for anxiety.    Diagnoses and all orders for this visit:    Viral URI with cough: I discussed with the patient that this was likely due a virus. I informed him that antibiotics would not treat an antibiotic, and " supportative care is warranted. I will prescribe the Albuterol as below. Patient does not want a prescription for Mucinex as he has this at home. I instructed him to use this as well as the saline nasal spray. Patient will return if his symptoms are not improving after one week. All questions were answered. Patient gave verbal agreement and understanding of this plan .       -     albuterol (PROVENTIL HFA;VENTOLIN HFA) 108 (90 Base) MCG/ACT inhaler; Inhale 2 puffs Every 4 (Four) Hours As Needed for Wheezing.    Anxiety: I discussed that I would prescribe the Wellbutrin 150 mg BID to see if this will improve his anxiety. I informed the patient that anxiety responds best to pharmalogic treatment and therapy sessions with a therapist. Patient again refuses being referred for therapy. I informed the patient that if he changed his mind in the future to call the office and I will place this referral.        -     buPROPion XL (WELLBUTRIN XL) 150 MG 24 hr tablet; Take 1 tablet by mouth 2 (Two) Times a Day.        Follow-up:     Return in about 3 months (around 11/17/2017).        Preventative:  Male Preventative: Exercises regularly  Vaccines:   Tetanus vaccine: up to date  Annual influenza vaccine: not up to date -   Pneumococcal vaccine: not up to date - Not indicated at this time.   Hep B vaccine: not up to date - Not indicated at this time.    Zoster vaccine:not up to date - Not indicated at this time.     Patient does not smoke.   occasional/rare  eat more fruits and vegetables and decrease soda or juice intake    RISK SCORE: 2              This document has been electronically signed by Memo Hall MD on August 18, 2017 1:59 PM

## 2017-08-17 NOTE — PROGRESS NOTES
I have seen the patient.  I have reviewed the notes, assessments, and/or procedures performed by Memo Hall MD , I concur with her/his documentation and assessment and plan for Antione Villaseñor.          This document has been electronically signed by Katia Serna MD on August 17, 2017 4:14 PM

## 2017-08-18 PROBLEM — J06.9 VIRAL URI WITH COUGH: Status: ACTIVE | Noted: 2017-08-18

## 2017-08-22 ENCOUNTER — TELEPHONE (OUTPATIENT)
Dept: FAMILY MEDICINE CLINIC | Facility: CLINIC | Age: 22
End: 2017-08-22

## 2017-08-22 RX ORDER — BUPROPION HYDROCHLORIDE 300 MG/1
300 TABLET ORAL DAILY
Qty: 30 TABLET | Refills: 3 | Status: SHIPPED | OUTPATIENT
Start: 2017-08-22 | End: 2018-04-06 | Stop reason: SINTOL

## 2017-08-22 NOTE — TELEPHONE ENCOUNTER
PT CALLED, SAID THAT HIS 2 TIMES A DAY WELLBUTRIN WAS DENIED AND WANTED TO KNOW IF YOU COULD PLEASE SEND IN 300MG       PT USES [facility]    [facility] PT [phone]

## 2017-08-28 ENCOUNTER — TELEPHONE (OUTPATIENT)
Dept: FAMILY MEDICINE CLINIC | Facility: CLINIC | Age: 22
End: 2017-08-28

## 2017-08-28 RX ORDER — MELOXICAM 15 MG/1
15 TABLET ORAL DAILY
Qty: 30 TABLET | Refills: 6 | Status: SHIPPED | OUTPATIENT
Start: 2017-08-28 | End: 2018-04-06 | Stop reason: SINTOL

## 2017-09-18 ENCOUNTER — OFFICE VISIT (OUTPATIENT)
Dept: ORTHOPEDIC SURGERY | Facility: CLINIC | Age: 22
End: 2017-09-18

## 2017-09-18 VITALS — WEIGHT: 173 LBS | HEIGHT: 70 IN | BODY MASS INDEX: 24.77 KG/M2

## 2017-09-18 DIAGNOSIS — M51.16 INTERVERTEBRAL DISC DISORDERS WITH RADICULOPATHY, LUMBAR REGION: ICD-10-CM

## 2017-09-18 DIAGNOSIS — M54.5 LOW BACK PAIN, UNSPECIFIED BACK PAIN LATERALITY, UNSPECIFIED CHRONICITY, WITH SCIATICA PRESENCE UNSPECIFIED: Primary | ICD-10-CM

## 2017-09-18 PROCEDURE — 99213 OFFICE O/P EST LOW 20 MIN: CPT | Performed by: ORTHOPAEDIC SURGERY

## 2017-09-18 NOTE — PROGRESS NOTES
"Antione Villaseñor is a 22 y.o. male returns for     Chief Complaint   Patient presents with   • Lumbar Spine - Follow-up       HISTORY OF PRESENT ILLNESS: Patient returns with low back pain and would like to discuss epidural injections.  No significant changes, he reports back pain, sometimes difficulty walking.  States he did the physical therapy which made the symptoms worse.  States  He did not have any improvement.  Taking meloxicam for pain control.         CONCURRENT MEDICAL HISTORY:    Past Medical History:   Diagnosis Date   • Ankle joint pain    • Bipolar disorder    • Burning feet syndrome    • Depressive disorder    • Dysuria     Dysuria - OVER ACTIVE BLADDER VS POLYURIA      • Encounter for general adult medical examination without abnormal findings    • Heart murmur    • Infertile male syndrome    • Intervertebral disc disorders with radiculopathy, lumbar region    • Low back pain    • Male hypogonadism     Male hypogonadism - not receiving testosterone replacement by urology      • Pain in unspecified foot    • Palpitations        No Known Allergies      Current Outpatient Prescriptions:   •  albuterol (PROVENTIL HFA;VENTOLIN HFA) 108 (90 Base) MCG/ACT inhaler, Inhale 2 puffs Every 4 (Four) Hours As Needed for Wheezing., Disp: 1 inhaler, Rfl: 3  •  buPROPion XL (WELLBUTRIN XL) 300 MG 24 hr tablet, Take 1 tablet by mouth Daily., Disp: 30 tablet, Rfl: 3  •  hydrOXYzine (VISTARIL) 25 MG capsule, Take 1 capsule by mouth 3 (Three) Times a Day As Needed for Itching., Disp: 90 capsule, Rfl: 3  •  meloxicam (MOBIC) 15 MG tablet, Take 1 tablet by mouth Daily., Disp: 30 tablet, Rfl: 6    Past Surgical History:   Procedure Laterality Date   • INJECTION OF MEDICATION      Kenalog (1)      10/05/2015           ROS  No fevers or chills.  No chest pain or shortness of air.  No GI or  disturbances.    PHYSICAL EXAMINATION:       Ht 70\" (177.8 cm)  Wt 173 lb (78.5 kg)  BMI 24.82 kg/m2    Physical Exam "   Constitutional: He appears well-developed.   HENT:   Head: Normocephalic and atraumatic.   Eyes: Pupils are equal, round, and reactive to light. Right eye exhibits no discharge. Left eye exhibits no discharge.   Neck: Normal range of motion. No JVD present. No tracheal deviation present. No thyromegaly present.   Cardiovascular: Normal rate, regular rhythm, normal heart sounds and intact distal pulses.  Exam reveals no gallop and no friction rub.    No murmur heard.  Pulmonary/Chest: Effort normal and breath sounds normal. No respiratory distress. He has no wheezes. He has no rales. He exhibits no tenderness.   Abdominal: Soft. Bowel sounds are normal. He exhibits no distension. There is no tenderness. There is no guarding.   Musculoskeletal: He exhibits no edema, tenderness or deformity.   Lymphadenopathy:     He has no cervical adenopathy.   Neurological: He is alert. He has normal reflexes. He displays normal reflexes. No cranial nerve deficit. Coordination normal.   Skin: Skin is warm. No rash noted. No erythema.   Psychiatric: He has a normal mood and affect. His behavior is normal. Thought content normal.       GAIT:     []  Normal  []  Antalgic    Assistive device: []  None  []  Walker     []  Crutches  []  Cane     []  Wheelchair  []  Stretcher    Right Ankle Exam     Muscle Strength   Dorsiflexion:  5/5  Plantar flexion:  5/5  Anterior tibial:  5/5  Posterior tibial:  5/5  Gastrocsoleus:  5/5  Peroneal muscle:  5/5      Left Ankle Exam     Muscle Strength   Dorsiflexion:  5/5   Plantar flexion:  5/5   Anterior tibial:  5/5   Posterior tibial:  5/5  Gastrocsoleus:  5/5  Peroneal muscle:  5/5      Right Hip Exam     Range of Motion   Internal Rotation: normal   External Rotation: normal     Muscle Strength   Abduction: 5/5   Flexion: 5/5       Left Hip Exam     Range of Motion   Internal Rotation: normal   External Rotation: normal     Muscle Strength   Abduction: 5/5   Flexion: 5/5       Back Exam      Tenderness   The patient is experiencing no tenderness.     Range of Motion   Extension: 30   Flexion:  90 normal   Lateral Bend Right: 20   Lateral Bend Left: 20   Rotation Right: 30   Rotation Left: 30     Muscle Strength   Right Quadriceps:  5/5   Left Quadriceps:  5/5   Right Hamstrings:  5/5   Left Hamstrings:  5/5     Tests   Straight leg raise right: negative  Straight leg raise left: negative    Reflexes   Patellar: 2/4  Achilles: 2/4  Biceps: 2/4  Babinski's sign: normal     Other   Sensation: normal  Gait: normal   Erythema: no back redness  Scars: absent                  No results found.    I reviewed the MRI of the lumbar spine of 12/31/15, the discs are of normal height, vertebra normal.        ASSESSMENT:    Diagnoses and all orders for this visit:    Low back pain, unspecified back pain laterality, unspecified chronicity, with sciatica presence unspecified    Intervertebral disc disorders with radiculopathy, lumbar region          PLAN    Recommend MRI of low back.  Injection IM toradol today.    Mg Vasquez MD

## 2017-09-28 ENCOUNTER — HOSPITAL ENCOUNTER (OUTPATIENT)
Dept: MRI IMAGING | Facility: HOSPITAL | Age: 22
Discharge: HOME | End: 2017-09-28
Admitting: ORTHOPAEDIC SURGERY

## 2017-09-28 DIAGNOSIS — M51.16 INTERVERTEBRAL DISC DISORDERS WITH RADICULOPATHY, LUMBAR REGION: ICD-10-CM

## 2017-09-28 PROCEDURE — 72148 MRI LUMBAR SPINE W/O DYE: CPT

## 2018-04-06 ENCOUNTER — OFFICE VISIT (OUTPATIENT)
Dept: FAMILY MEDICINE CLINIC | Facility: CLINIC | Age: 23
End: 2018-04-06

## 2018-04-06 ENCOUNTER — APPOINTMENT (OUTPATIENT)
Dept: LAB | Facility: HOSPITAL | Age: 23
End: 2018-04-06

## 2018-04-06 VITALS
HEART RATE: 67 BPM | HEIGHT: 70 IN | WEIGHT: 160.9 LBS | OXYGEN SATURATION: 98 % | SYSTOLIC BLOOD PRESSURE: 140 MMHG | DIASTOLIC BLOOD PRESSURE: 68 MMHG | BODY MASS INDEX: 23.03 KG/M2

## 2018-04-06 DIAGNOSIS — R41.3 MEMORY LOSS: Primary | ICD-10-CM

## 2018-04-06 LAB
CRP SERPL-MCNC: <0.5 MG/DL (ref 0–1)
T4 FREE SERPL-MCNC: 1.06 NG/DL (ref 0.78–2.19)
TSH SERPL DL<=0.05 MIU/L-ACNC: 2.25 MIU/ML (ref 0.46–4.68)
VIT B12 BLD-MCNC: 560 PG/ML (ref 239–931)

## 2018-04-06 PROCEDURE — 86003 ALLG SPEC IGE CRUDE XTRC EA: CPT | Performed by: FAMILY MEDICINE

## 2018-04-06 PROCEDURE — 86140 C-REACTIVE PROTEIN: CPT | Performed by: FAMILY MEDICINE

## 2018-04-06 PROCEDURE — 36415 COLL VENOUS BLD VENIPUNCTURE: CPT | Performed by: FAMILY MEDICINE

## 2018-04-06 PROCEDURE — 82607 VITAMIN B-12: CPT | Performed by: FAMILY MEDICINE

## 2018-04-06 PROCEDURE — 99213 OFFICE O/P EST LOW 20 MIN: CPT | Performed by: FAMILY MEDICINE

## 2018-04-06 PROCEDURE — 84207 ASSAY OF VITAMIN B-6: CPT | Performed by: FAMILY MEDICINE

## 2018-04-06 PROCEDURE — 84443 ASSAY THYROID STIM HORMONE: CPT | Performed by: FAMILY MEDICINE

## 2018-04-06 PROCEDURE — 84439 ASSAY OF FREE THYROXINE: CPT | Performed by: FAMILY MEDICINE

## 2018-04-06 NOTE — PROGRESS NOTES
Subjective:     Antione Villaseñor is a 22 y.o. male who presents for follow up for memory loss:    Memory loss:   This began 2 years ago. He was found to have mild low testosterone. He was evaluated by Endocrineology and was started on Clomiphene, and was not prescribed testosterone. He felt that he needed steroids. He began using anabolic steroids he obtained from the internet  from 2013 until Jan 2016. After using the second batch he started having problems with memory loss. He had severe memory loss for the first 6 months. He feels that he has improved 30 % to his baseline since that time. He still forgets things easily. On occasion he drives somewhere and forgets where he is going. He feels that he can remember long term memories well, from before this started. He has difficulty with recent memories. He and his wife have noticed that the memory loss seems to be worse with caffine and ETOH. This seems to be better when he does not eat. If he eats processed foods or foods high in sugar then his memory seems to be worse. He went to the [facility] the day after this started, and a MRI was performed, which showed no abnormalities. He has not been to see a neurologist. He felt that he had a lot of pressure in his head after this started. This has improved, but has occasional pressure in his head. He does not feel that this is sinus pressure. He was taking Wellbutrin which he felt was helping intially, but this seemed to be helping his mood and energy. He has not seen any difference in his memory loss since stopping the medication. He has no focal deficits. He denies numbness or weakness of his upper or lower extremities. He denies any slurring of his words or facial drooping.     Preventative:  Over the past 2 weeks, have you felt down, depressed, or hopeless?No   Over the past 2 weeks, have you felt little interest or pleasure in doing things?Yes  Clinical depression screening refused by patient.No     On osteoporosis  therapy?Not Indicated     Past Medical Hx:  Past Medical History:   Diagnosis Date   • Ankle joint pain    • Bipolar disorder    • Burning feet syndrome    • Depressive disorder    • Dysuria     Dysuria - OVER ACTIVE BLADDER VS POLYURIA      • Encounter for general adult medical examination without abnormal findings    • Heart murmur    • Infertile male syndrome    • Intervertebral disc disorders with radiculopathy, lumbar region    • Low back pain    • Male hypogonadism     Male hypogonadism - not receiving testosterone replacement by urology      • Pain in unspecified foot    • Palpitations        Past Surgical Hx:  Past Surgical History:   Procedure Laterality Date   • INJECTION OF MEDICATION      Kenalog (1)      10/05/2015           Health Maintenance:  Health Maintenance   Topic Date Due   • INFLUENZA VACCINE  08/01/2017   • TDAP/TD VACCINES (2 - Td) 01/01/2021       Current Meds:  No current outpatient prescriptions on file.    Allergies:  Review of patient's allergies indicates no known allergies.    Family Hx:  Family History   Problem Relation Age of Onset   • Asthma Other    • Diabetes Other    • Heart disease Other    • Hypertension Other    • Lung disease Other    • Coronary artery disease Father    • Hypertension Father         Social History:  Social History     Social History   • Marital status:      Spouse name: N/A   • Number of children: N/A   • Years of education: N/A     Occupational History   • Not on file.     Social History Main Topics   • Smoking status: Never Smoker   • Smokeless tobacco: Never Used   • Alcohol use Yes      Comment: 3-4 beers 2 times per month.   • Drug use: No   • Sexual activity: Yes     Partners: Female     Other Topics Concern   • Not on file     Social History Narrative   • No narrative on file       Review of Systems  Review of Systems   Constitutional: Negative for appetite change, chills and fever.   HENT: Negative for congestion, ear pain, hearing loss,  "rhinorrhea, sore throat and trouble swallowing.    Eyes: Negative for pain and visual disturbance.   Respiratory: Negative for cough, shortness of breath and wheezing.    Cardiovascular: Negative for chest pain, palpitations and leg swelling.   Gastrointestinal: Negative for abdominal pain, constipation, diarrhea, nausea and vomiting.   Genitourinary: Negative for dysuria and hematuria.   Musculoskeletal: Negative for back pain and neck pain.   Skin: Negative for rash and wound.   Neurological: Positive for dizziness. Negative for syncope and weakness.   Psychiatric/Behavioral: Positive for dysphoric mood and sleep disturbance. Negative for suicidal ideas. The patient is nervous/anxious.        Objective:     /68 (BP Location: Left arm, Patient Position: Sitting)   Pulse 67   Ht 177.8 cm (70\")   Wt 73 kg (160 lb 14.4 oz)   SpO2 98%   BMI 23.09 kg/m²    Physical Exam   Constitutional: He is oriented to person, place, and time. He appears well-developed and well-nourished.   HENT:   Head: Normocephalic and atraumatic.   Mouth/Throat: Oropharynx is clear and moist.   Eyes: Conjunctivae and EOM are normal. Pupils are equal, round, and reactive to light.   Neck: Normal range of motion. Neck supple. No tracheal deviation present. No thyromegaly present.   Cardiovascular: Normal rate, regular rhythm and normal heart sounds.  Exam reveals no gallop and no friction rub.    No murmur heard.  Pulmonary/Chest: Effort normal and breath sounds normal. No respiratory distress. He has no wheezes. He has no rales.   Abdominal: Soft. Bowel sounds are normal. He exhibits no distension. There is no tenderness.   Musculoskeletal: Normal range of motion. He exhibits no tenderness.        Back:    Lymphadenopathy:     He has no cervical adenopathy.   Neurological: He is alert and oriented to person, place, and time. No cranial nerve deficit.   Skin: Skin is warm and dry. No rash noted. No erythema.   Psychiatric: He has a " normal mood and affect. His behavior is normal. Thought content normal.   Vitals reviewed.    Assessment/Plan:     1. Memory loss         Antione was seen today for memory loss.    Diagnoses and all orders for this visit:    Memory loss: This is a chronic, worsening problem. I will order the tests below to try to find an organic cause for the memory loss. Due to the complexity of the problem, I will refer the patient to Neurology in [place], [place]. Patient gave verbal understanding and agreement to this plan.   -     TSH  -     T4, free  -     C-reactive protein  -     Vitamin B12  -     Vitamin B6  -     Allergen Food Panel  -     Ambulatory Referral to Neurology      Follow-up:     Return in about 3 months (around 7/6/2018).      Goals        Patient Stated    • Patient reports that he would like to have improvment of his memory.  (pt-stated)            Barriers to goal: None            Preventative:    Vaccines Recommended at this visit:   Influenza    Vaccines Received at this visit:  Patient refused all vaccinations at this visit.    Screenings Recommended at this visit:  No Screenings offered today. Patient is up to date on all screenings at this time.     Screenings Ordered at this visit:  No screenings were offered today. Patient is up to date on all screenings.     Smoking Status:  Patient has never smoked.    Alcohol Intake:  Patient does not drink    Patient's BMI is Body mass index is 23.09 kg/m². at today's visit. This is classified as 18.5-24.9: Normal range..   Goals discussed with the patient to maintain a healthy weight were eat more fruits and vegetables, decrease soda or juice intake and increase physical activity        RISK SCORE: 3              This document has been electronically signed by Memo Hall MD on April 9, 2018 3:03 AM

## 2018-04-10 LAB — VIT B6 SERPL-MCNC: 37.5 UG/L (ref 5.3–46.7)

## 2018-04-10 NOTE — PROGRESS NOTES
I have seen the patient.  I have reviewed the notes, assessments, and/or procedures performed by Dr. Hall, I concur with her/his documentation and assessment and plan for Antione Villaseñor.       This document has been electronically signed by Brown Johnson MD on April 10, 2018 11:37 AM

## 2018-04-11 LAB
BARLEY IGE QN: <0.1 KU/L
BEEF IGE QN: <0.1 KU/L
CABBAGE IGE QN: <0.1 KU/L
CARROT IGE QN: <0.1 KU/L
CHICKEN MEAT IGE QN: <0.1 KU/L
CODFISH IGE QN: <0.1 KU/L
CONV CLASS DESCRIPTION: NORMAL
CORN IGE QN: <0.1 KU/L
COW MILK IGE QN: <0.1 KU/L
CRAB IGE QN: <0.1 KU/L
EGG WHITE IGE QN: <0.1 KU/L
GRAPE IGE QN: <0.1 KU/L
GREEN PEPPER IGE QN: <0.1 KU/L
LETTUCE IGE QN: <0.1 KU/L
OAT IGE QN: <0.1 KU/L
ORANGE IGE QN: <0.1 KU/L
PEANUT IGE QN: <0.1 KU/L
PORK IGE QN: <0.1 KU/L
POTATO IGE QN: <0.1 KU/L
RICE IGE QN: <0.1 KU/L
RYE IGE: <0.1 KU/L
SHRIMP IGE: <0.1 KU/L
SOYBEAN IGE QN: <0.1 KU/L
TOMATO IGE QN: <0.1 KU/L
TUNA IGE QN: <0.1 KU/L
WHEAT IGE QN: <0.1 KU/L
WHITE BEAN IGE QN: <0.1 KU/L

## 2018-04-29 ENCOUNTER — HOSPITAL ENCOUNTER (EMERGENCY)
Facility: HOSPITAL | Age: 23
Discharge: HOME | End: 2018-04-30
Attending: EMERGENCY MEDICINE | Admitting: EMERGENCY MEDICINE

## 2018-04-29 ENCOUNTER — APPOINTMENT (OUTPATIENT)
Dept: GENERAL RADIOLOGY | Facility: HOSPITAL | Age: 23
End: 2018-04-29

## 2018-04-29 DIAGNOSIS — R10.9 ABDOMINAL PAIN, UNSPECIFIED ABDOMINAL LOCATION: ICD-10-CM

## 2018-04-29 DIAGNOSIS — R19.7 DIARRHEA IN ADULT PATIENT: Primary | ICD-10-CM

## 2018-04-29 LAB
ALBUMIN SERPL-MCNC: 3.7 G/DL (ref 3.4–4.8)
ALBUMIN/GLOB SERPL: 1.4 G/DL (ref 1.1–1.8)
ALP SERPL-CCNC: 38 U/L (ref 38–126)
ALT SERPL W P-5'-P-CCNC: 43 U/L (ref 21–72)
ANION GAP SERPL CALCULATED.3IONS-SCNC: 13 MMOL/L (ref 5–15)
AST SERPL-CCNC: 31 U/L (ref 17–59)
BASOPHILS # BLD AUTO: 0.01 10*3/MM3 (ref 0–0.2)
BASOPHILS NFR BLD AUTO: 0.2 % (ref 0–2)
BILIRUB SERPL-MCNC: 0.3 MG/DL (ref 0.2–1.3)
BILIRUB UR QL STRIP: NEGATIVE
BUN BLD-MCNC: 10 MG/DL (ref 7–21)
BUN/CREAT SERPL: 10.8 (ref 7–25)
CALCIUM SPEC-SCNC: 8.8 MG/DL (ref 8.4–10.2)
CHLORIDE SERPL-SCNC: 101 MMOL/L (ref 95–110)
CLARITY UR: CLEAR
CO2 SERPL-SCNC: 25 MMOL/L (ref 22–31)
COLOR UR: YELLOW
CREAT BLD-MCNC: 0.93 MG/DL (ref 0.7–1.3)
DEPRECATED RDW RBC AUTO: 38.5 FL (ref 35.1–43.9)
EOSINOPHIL # BLD AUTO: 0.03 10*3/MM3 (ref 0–0.7)
EOSINOPHIL NFR BLD AUTO: 0.6 % (ref 0–7)
ERYTHROCYTE [DISTWIDTH] IN BLOOD BY AUTOMATED COUNT: 12 % (ref 11.5–14.5)
GFR SERPL CREATININE-BSD FRML MDRD: 100 ML/MIN/1.73 (ref 77–179)
GLOBULIN UR ELPH-MCNC: 2.7 GM/DL (ref 2.3–3.5)
GLUCOSE BLD-MCNC: 95 MG/DL (ref 60–100)
GLUCOSE UR STRIP-MCNC: NEGATIVE MG/DL
HCT VFR BLD AUTO: 40.9 % (ref 39–49)
HGB BLD-MCNC: 14.3 G/DL (ref 13.7–17.3)
HGB UR QL STRIP.AUTO: NEGATIVE
IMM GRANULOCYTES # BLD: 0.01 10*3/MM3 (ref 0–0.02)
IMM GRANULOCYTES NFR BLD: 0.2 % (ref 0–0.5)
KETONES UR QL STRIP: NEGATIVE
LEUKOCYTE ESTERASE UR QL STRIP.AUTO: NEGATIVE
LIPASE SERPL-CCNC: 59 U/L (ref 23–300)
LYMPHOCYTES # BLD AUTO: 1.39 10*3/MM3 (ref 0.6–4.2)
LYMPHOCYTES NFR BLD AUTO: 26.4 % (ref 10–50)
MCH RBC QN AUTO: 30.6 PG (ref 26.5–34)
MCHC RBC AUTO-ENTMCNC: 35 G/DL (ref 31.5–36.3)
MCV RBC AUTO: 87.4 FL (ref 80–98)
MONOCYTES # BLD AUTO: 1.01 10*3/MM3 (ref 0–0.9)
MONOCYTES NFR BLD AUTO: 19.2 % (ref 0–12)
NEUTROPHILS # BLD AUTO: 2.82 10*3/MM3 (ref 2–8.6)
NEUTROPHILS NFR BLD AUTO: 53.4 % (ref 37–80)
NITRITE UR QL STRIP: NEGATIVE
NRBC BLD MANUAL-RTO: 0 /100 WBC (ref 0–0)
PH UR STRIP.AUTO: 6 [PH] (ref 5–9)
PLATELET # BLD AUTO: 151 10*3/MM3 (ref 150–450)
PMV BLD AUTO: 10.3 FL (ref 8–12)
POTASSIUM BLD-SCNC: 3.8 MMOL/L (ref 3.5–5.1)
PROT SERPL-MCNC: 6.4 G/DL (ref 6.3–8.6)
PROT UR QL STRIP: NEGATIVE
RBC # BLD AUTO: 4.68 10*6/MM3 (ref 4.37–5.74)
SODIUM BLD-SCNC: 139 MMOL/L (ref 137–145)
SP GR UR STRIP: 1.01 (ref 1–1.03)
UROBILINOGEN UR QL STRIP: NORMAL
WBC NRBC COR # BLD: 5.27 10*3/MM3 (ref 3.2–9.8)

## 2018-04-29 PROCEDURE — 96374 THER/PROPH/DIAG INJ IV PUSH: CPT

## 2018-04-29 PROCEDURE — 85025 COMPLETE CBC W/AUTO DIFF WBC: CPT | Performed by: EMERGENCY MEDICINE

## 2018-04-29 PROCEDURE — 96361 HYDRATE IV INFUSION ADD-ON: CPT

## 2018-04-29 PROCEDURE — 80053 COMPREHEN METABOLIC PANEL: CPT | Performed by: EMERGENCY MEDICINE

## 2018-04-29 PROCEDURE — 74019 RADEX ABDOMEN 2 VIEWS: CPT

## 2018-04-29 PROCEDURE — 99284 EMERGENCY DEPT VISIT MOD MDM: CPT

## 2018-04-29 PROCEDURE — 83690 ASSAY OF LIPASE: CPT | Performed by: EMERGENCY MEDICINE

## 2018-04-29 PROCEDURE — 25010000002 ONDANSETRON PER 1 MG: Performed by: EMERGENCY MEDICINE

## 2018-04-29 PROCEDURE — 81003 URINALYSIS AUTO W/O SCOPE: CPT | Performed by: EMERGENCY MEDICINE

## 2018-04-29 RX ORDER — ONDANSETRON 2 MG/ML
4 INJECTION INTRAMUSCULAR; INTRAVENOUS ONCE
Status: COMPLETED | OUTPATIENT
Start: 2018-04-29 | End: 2018-04-29

## 2018-04-29 RX ORDER — SODIUM CHLORIDE 0.9 % (FLUSH) 0.9 %
10 SYRINGE (ML) INJECTION AS NEEDED
Status: DISCONTINUED | OUTPATIENT
Start: 2018-04-29 | End: 2018-04-30 | Stop reason: HOSPADM

## 2018-04-29 RX ADMIN — ONDANSETRON 4 MG: 2 INJECTION INTRAMUSCULAR; INTRAVENOUS at 23:17

## 2018-04-29 RX ADMIN — SODIUM CHLORIDE 1000 ML: 9 INJECTION, SOLUTION INTRAVENOUS at 23:16

## 2018-04-30 VITALS
RESPIRATION RATE: 16 BRPM | HEART RATE: 68 BPM | OXYGEN SATURATION: 98 % | TEMPERATURE: 98.6 F | BODY MASS INDEX: 22.4 KG/M2 | HEIGHT: 71 IN | DIASTOLIC BLOOD PRESSURE: 67 MMHG | WEIGHT: 160 LBS | SYSTOLIC BLOOD PRESSURE: 125 MMHG

## 2018-04-30 LAB
HOLD SPECIMEN: NORMAL
HOLD SPECIMEN: NORMAL
WHOLE BLOOD HOLD SPECIMEN: NORMAL
WHOLE BLOOD HOLD SPECIMEN: NORMAL

## 2018-04-30 RX ORDER — METRONIDAZOLE 500 MG/1
500 TABLET ORAL ONCE
Status: COMPLETED | OUTPATIENT
Start: 2018-04-30 | End: 2018-04-30

## 2018-04-30 RX ORDER — METRONIDAZOLE 500 MG/1
500 TABLET ORAL 2 TIMES DAILY
Qty: 14 TABLET | Refills: 0 | Status: SHIPPED | OUTPATIENT
Start: 2018-04-30 | End: 2018-06-07

## 2018-04-30 RX ORDER — DIPHENOXYLATE HYDROCHLORIDE AND ATROPINE SULFATE 2.5; .025 MG/1; MG/1
1 TABLET ORAL ONCE
Status: COMPLETED | OUTPATIENT
Start: 2018-04-30 | End: 2018-04-30

## 2018-04-30 RX ORDER — DIPHENOXYLATE HYDROCHLORIDE AND ATROPINE SULFATE 2.5; .025 MG/1; MG/1
1 TABLET ORAL 4 TIMES DAILY PRN
Qty: 12 TABLET | Refills: 0 | Status: SHIPPED | OUTPATIENT
Start: 2018-04-30 | End: 2018-06-07

## 2018-04-30 RX ADMIN — DIPHENOXYLATE HYDROCHLORIDE AND ATROPINE SULFATE 1 TABLET: 2.5; .025 TABLET ORAL at 00:55

## 2018-04-30 RX ADMIN — METRONIDAZOLE 500 MG: 500 TABLET ORAL at 00:14

## 2018-05-01 ENCOUNTER — TELEPHONE (OUTPATIENT)
Dept: FAMILY MEDICINE CLINIC | Facility: CLINIC | Age: 23
End: 2018-05-01

## 2018-05-31 ENCOUNTER — OFFICE VISIT (OUTPATIENT)
Dept: FAMILY MEDICINE CLINIC | Facility: CLINIC | Age: 23
End: 2018-05-31

## 2018-05-31 ENCOUNTER — APPOINTMENT (OUTPATIENT)
Dept: LAB | Facility: HOSPITAL | Age: 23
End: 2018-05-31

## 2018-05-31 VITALS
OXYGEN SATURATION: 98 % | HEIGHT: 71 IN | SYSTOLIC BLOOD PRESSURE: 126 MMHG | HEART RATE: 96 BPM | DIASTOLIC BLOOD PRESSURE: 82 MMHG | BODY MASS INDEX: 23.8 KG/M2 | WEIGHT: 170 LBS

## 2018-05-31 DIAGNOSIS — G89.29 CHRONIC LEFT-SIDED LOW BACK PAIN WITH LEFT-SIDED SCIATICA: Primary | ICD-10-CM

## 2018-05-31 DIAGNOSIS — Z79.899 LONG-TERM USE OF HIGH-RISK MEDICATION: ICD-10-CM

## 2018-05-31 DIAGNOSIS — M54.42 CHRONIC LEFT-SIDED LOW BACK PAIN WITH LEFT-SIDED SCIATICA: Primary | ICD-10-CM

## 2018-05-31 PROCEDURE — 99213 OFFICE O/P EST LOW 20 MIN: CPT | Performed by: FAMILY MEDICINE

## 2018-05-31 PROCEDURE — G0481 DRUG TEST DEF 8-14 CLASSES: HCPCS | Performed by: FAMILY MEDICINE

## 2018-05-31 PROCEDURE — 80307 DRUG TEST PRSMV CHEM ANLYZR: CPT | Performed by: FAMILY MEDICINE

## 2018-05-31 RX ORDER — GABAPENTIN 300 MG/1
300 CAPSULE ORAL NIGHTLY
Qty: 30 CAPSULE | Refills: 0 | Status: SHIPPED | OUTPATIENT
Start: 2018-05-31 | End: 2018-06-07

## 2018-05-31 NOTE — PROGRESS NOTES
I have seen the patient.  I have reviewed the notes, assessments, and/or procedures performed by Dr. Archer I concur with her/his documentation and assessment and plan for Antione Villaseñor.          This document has been electronically signed by Katia Serna MD on May 31, 2018 10:39 AM

## 2018-05-31 NOTE — PROGRESS NOTES
Subjective:     Antione Villaseñor is a 22 y.o. male who presents for follow up for left sided sciatica. Patient states he has had the issue for the past 5 years. He has a history of jumping off of a 4 story building and hurting his back years ago. To date, for his left sided back and sciatica pain he has done months of physical therapy, received multiple steroid injections, takes mobic regularly, had a negative MRI done, and seen Dr. Vasquez of orthopedic surgery without reported improvement of his condition. He states that when he stretches his hamstrings and gluteus the pain is aggravated. He does pool exercises at home and tries to stretch daily. He is hoping to try a different medication for the pain that he describes as shooting pain, intermittently, down the back side of his left leg to his ankle from his left lower back. Patient has no other acute complaints or concerns today.      Preventative:  Over the past 2 weeks, have you felt down, depressed, or hopeless?No  Over the past 2 weeks, have you felt little interest or pleasure in doing things?No  Clinical depression screening refused by patient.No     On osteoporosis therapy?Not Indicated     Past Medical Hx:  Past Medical History:   Diagnosis Date   • Ankle joint pain    • Bipolar disorder    • Burning feet syndrome    • Depressive disorder    • Dysuria     Dysuria - OVER ACTIVE BLADDER VS POLYURIA      • Encounter for general adult medical examination without abnormal findings    • Heart murmur    • Infertile male syndrome    • Intervertebral disc disorders with radiculopathy, lumbar region    • Low back pain    • Male hypogonadism     Male hypogonadism - not receiving testosterone replacement by urology      • Pain in unspecified foot    • Palpitations        Past Surgical Hx:  Past Surgical History:   Procedure Laterality Date   • INJECTION OF MEDICATION      Kenalog (1)      10/05/2015           Health Maintenance:  Health Maintenance   Topic Date  Due   • ANNUAL PHYSICAL  08/27/1996   • INFLUENZA VACCINE  08/01/2018   • TDAP/TD VACCINES (2 - Td) 01/01/2021       Current Meds:    Current Outpatient Prescriptions:   •  diphenoxylate-atropine (LOMOTIL) 2.5-0.025 MG per tablet, Take 1 tablet by mouth 4 (Four) Times a Day As Needed for Diarrhea., Disp: 12 tablet, Rfl: 0  •  metroNIDAZOLE (FLAGYL) 500 MG tablet, Take 1 tablet by mouth 2 (Two) Times a Day., Disp: 14 tablet, Rfl: 0  •  gabapentin (NEURONTIN) 300 MG capsule, Take 1 capsule by mouth Every Night., Disp: 30 capsule, Rfl: 0    Allergies:  Patient has no known allergies.    Family Hx:  Family History   Problem Relation Age of Onset   • Asthma Other    • Diabetes Other    • Heart disease Other    • Hypertension Other    • Lung disease Other    • Coronary artery disease Father    • Hypertension Father         Social History:  Social History     Social History   • Marital status:      Spouse name: N/A   • Number of children: N/A   • Years of education: N/A     Occupational History   • Not on file.     Social History Main Topics   • Smoking status: Never Smoker   • Smokeless tobacco: Never Used   • Alcohol use Yes      Comment: 3-4 beers 2 times per month.   • Drug use: No   • Sexual activity: Yes     Partners: Female     Other Topics Concern   • Not on file     Social History Narrative   • No narrative on file       Review of Systems  Review of Systems  General:negative for - chills, fatigue, fever  Ophthalmic: negative for - blurry vision or loss of vision  ENT: negative for - hearing change, nasal congestion or sore throat  Hematological and Lymphatic: negative for - jaundice  Endocrine: negative for - hair pattern changes, skin changes or temperature intolerance  Respiratory: no cough, shortness of breath, or wheezing  Cardiovascular: no chest pain, edema or dyspnea on exertion  Gastrointestinal: no  Nausea/vomiting, abdominal pain, change in bowel habits, or black or bloody stools  Genito-Urinary:  "no dysuria, trouble voiding, or hematuria  Musculoskeletal: Positive for left back pain with left sided pain radiating down back of leg to ankle.  Neurological: negative for - dizziness, headaches, numbness/tingling or seizures  Dermatological: negative for rash and skin lesion changes    Objective:     /82 (BP Location: Left arm, Patient Position: Sitting, Cuff Size: Adult)   Pulse 96   Ht 180.3 cm (71\")   Wt 77.1 kg (170 lb)   SpO2 98%   BMI 23.71 kg/m²    Physical Exam   General Appearance:    Alert, cooperative, no distress, appears stated age   Head:    Normocephalic, without obvious abnormality, atraumatic   Eyes:    PERRL, conjunctiva/corneas clear, EOM's intact   Ears:    Normal external ear canals, both ears   Nose:   Nares normal, septum midline, mucosa normal, no drainage     or sinus tenderness   Throat:   Lips, mucosa, and tongue normal; teeth and gums normal   Neck:   Supple, symmetrical, trachea midline, no adenopathy   Back:     Symmetric, no curvature, ROM normal. Left sided shooting pain on left hip flexion.   Lungs:     Clear to auscultation bilaterally, respirations unlabored   Chest Wall:    No tenderness or deformity    Heart:    Regular rate and rhythm, S1 and S2 normal, no murmur, rub    or gallop   Abdomen:     Soft, non-tender, bowel sounds active all four quadrants,     no masses, no organomegaly   Extremities:   Extremities normal, atraumatic, no cyanosis or edema.   Skin:   Skin color, texture, turgor normal, no rashes or lesions   Lymph nodes:   Cervical, supraclavicular nodes normal   Neurologic:   CNII-XII grossly intact       Assessment/Plan:     Antione was seen today for memory loss.    Diagnoses and all orders for this visit:     Diagnosis Plan   1. Chronic left-sided low back pain with left-sided sciatica  gabapentin (NEURONTIN) 300 MG capsule, nightly, will be trialled for this month. Will follow up with Dr. Hall in a month for re-evaluation. Discussed risks and " benefits of medication. Advised to return if symptoms worsened.  [facility] #: [id number]   2. Long-term use of high-risk medication  ToxASSURE Select 13 (MW) - Urine, Clean Catch           Follow-up:     Return in about 1 month (around 6/30/2018) for Next scheduled follow up with Dr. Hall.      Goals        Patient Stated    • Patient reports that he would like to have improvment of his memory.  (pt-stated)            Barriers to goal: None            Preventative:    Vaccines Recommended at this visit:   Influenza    Vaccines Received at this visit:  Patient refused all vaccinations at this visit.    Screenings Recommended at this visit:  No Screenings offered today. Patient is up to date on all screenings at this time.     Screenings Ordered at this visit:  No screenings were offered today. Patient is up to date on all screenings.     Smoking Status:  Patient has never smoked.    Alcohol Intake:  Patient does not drink    Patient's BMI is Body mass index is 23.71 kg/m². at today's visit. This is classified as 18.5-24.9: Normal range..   Goals discussed with the patient to maintain a healthy weight were eat more fruits and vegetables, decrease soda or juice intake and increase physical activity        RISK SCORE: 3              This document has been electronically signed by Alec Archer MD on May 31, 2018 11:04 AM

## 2018-06-05 LAB — CONV REPORT SUMMARY: NORMAL

## 2018-06-07 ENCOUNTER — OFFICE VISIT (OUTPATIENT)
Dept: FAMILY MEDICINE CLINIC | Facility: CLINIC | Age: 23
End: 2018-06-07

## 2018-06-07 ENCOUNTER — OFFICE VISIT (OUTPATIENT)
Dept: NEUROLOGY | Facility: CLINIC | Age: 23
End: 2018-06-07

## 2018-06-07 VITALS — OXYGEN SATURATION: 98 % | WEIGHT: 171 LBS | BODY MASS INDEX: 23.94 KG/M2 | HEIGHT: 71 IN | HEART RATE: 89 BPM

## 2018-06-07 VITALS
DIASTOLIC BLOOD PRESSURE: 70 MMHG | RESPIRATION RATE: 18 BRPM | HEIGHT: 70 IN | WEIGHT: 172 LBS | SYSTOLIC BLOOD PRESSURE: 128 MMHG | HEART RATE: 72 BPM | BODY MASS INDEX: 24.62 KG/M2

## 2018-06-07 DIAGNOSIS — F90.2 ATTENTION DEFICIT HYPERACTIVITY DISORDER (ADHD), COMBINED TYPE: ICD-10-CM

## 2018-06-07 DIAGNOSIS — M51.16 INTERVERTEBRAL DISC DISORDERS WITH RADICULOPATHY, LUMBAR REGION: Primary | ICD-10-CM

## 2018-06-07 DIAGNOSIS — R41.3 MEMORY DIFFICULTY: Primary | ICD-10-CM

## 2018-06-07 PROCEDURE — 99204 OFFICE O/P NEW MOD 45 MIN: CPT | Performed by: PSYCHIATRY & NEUROLOGY

## 2018-06-07 PROCEDURE — 99212 OFFICE O/P EST SF 10 MIN: CPT | Performed by: FAMILY MEDICINE
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
## 2018-06-07 NOTE — PROGRESS NOTES
Subjective:     Antione Villaseñor is a 22 y.o. male who presents for follow up for Left Sided Sciatic Pain:     Left sided Sciatic pain:   He has been having left sided sciatic pain for 5 years. He was started on Gabapentin 300 mg 1 tab QHS on 5/31/18. This made him very sleepy. It did not seem to help the pain. He has been to PT, Chiropracter. He has had 2 MRI's and been evaluated by Dr. Vasquez. He was not found to be a surgical candidate.     ADHD:   He reports he was originally In the past he had been on Adderall for ADHD. He was seeing Dr. Murphy in the past who was prescribing. He has been off of this since Jan 2016. He was started on Adderall at age 3 for ADHD. He was on this intermittently. He did have slight decreased in appetite as a child. He has been diagnosed twice. He had testing at age 19, he believes that this may have been done by Dr. Galicia. 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 He has had difficulty focusing at work. This has been worse since he tried the steroids.     Preventative:  Over the past 2 weeks, have you felt down, depressed, or hopeless?No   Over the past 2 weeks, have you felt little interest or pleasure in doing things?No  Clinical depression screening refused by patient.Not Indicated     On osteoporosis therapy?Not Indicated     Past Medical Hx:  Past Medical History:   Diagnosis Date   • Ankle joint pain    • Bipolar disorder    • Burning feet syndrome    • Depressive disorder    • Dysuria     Dysuria - OVER ACTIVE BLADDER VS POLYURIA      • Encounter for general adult medical examination without abnormal findings    • Heart murmur    • Infertile male syndrome    • Intervertebral disc disorders with radiculopathy, lumbar region    • Low back pain    • Male hypogonadism     Male hypogonadism - not receiving testosterone replacement by urology      • Memory loss    • Pain in unspecified foot    • Palpitations        Past Surgical Hx:  Past Surgical History:   Procedure Laterality  Date   • INJECTION OF MEDICATION      Kenalog (1)      10/05/2015           Health Maintenance:  Health Maintenance   Topic Date Due   • INFLUENZA VACCINE  08/01/2018   • ANNUAL PHYSICAL  06/08/2019   • TDAP/TD VACCINES (2 - Td) 01/01/2021       Current Meds:  No current outpatient prescriptions on file.    Allergies:  Patient has no known allergies.    Family Hx:  Family History   Problem Relation Age of Onset   • Asthma Other    • Diabetes Other    • Heart disease Other    • Hypertension Other    • Lung disease Other    • Coronary artery disease Father    • Hypertension Father         Social History:  Social History     Social History   • Marital status:      Spouse name: N/A   • Number of children: N/A   • Years of education: N/A     Occupational History   • Not on file.     Social History Main Topics   • Smoking status: Never Smoker   • Smokeless tobacco: Never Used   • Alcohol use Yes      Comment: 3-4 beers 2 times per month.   • Drug use: No   • Sexual activity: Yes     Partners: Female     Other Topics Concern   • Not on file     Social History Narrative   • No narrative on file       Review of Systems  Review of Systems   Constitutional: Negative for chills and fever.   HENT: Negative for congestion, rhinorrhea, sore throat and trouble swallowing.    Eyes: Negative for pain and visual disturbance.   Respiratory: Negative for cough and shortness of breath.    Cardiovascular: Negative for chest pain and palpitations.   Gastrointestinal: Negative for abdominal pain, constipation, diarrhea, nausea and vomiting.   Genitourinary: Negative for dysuria and hematuria.   Musculoskeletal: Positive for back pain. Negative for neck pain.   Skin: Negative for rash and wound.   Neurological: Negative for dizziness, weakness and headaches.   Psychiatric/Behavioral: Positive for decreased concentration. Negative for agitation, confusion, dysphoric mood and suicidal ideas. The patient is not nervous/anxious.   "      Objective:     Pulse 89   Ht 180.3 cm (71\")   Wt 77.6 kg (171 lb)   SpO2 98%   BMI 23.85 kg/m²   Physical Exam   Constitutional: He is oriented to person, place, and time. He appears well-developed and well-nourished.   HENT:   Head: Normocephalic and atraumatic.   Mouth/Throat: Oropharynx is clear and moist.   Eyes: Conjunctivae and EOM are normal. Pupils are equal, round, and reactive to light.   Neck: Normal range of motion. Neck supple. No tracheal deviation present. No thyromegaly present.   Cardiovascular: Normal rate, regular rhythm and normal heart sounds.  Exam reveals no gallop and no friction rub.    No murmur heard.  Pulmonary/Chest: Effort normal and breath sounds normal. No respiratory distress. He has no wheezes. He has no rales.   Abdominal: Soft. Bowel sounds are normal. He exhibits no distension. There is no tenderness.   Musculoskeletal: Normal range of motion. He exhibits no tenderness.   Lymphadenopathy:     He has no cervical adenopathy.   Neurological: He is alert and oriented to person, place, and time. No cranial nerve deficit.   Skin: Skin is warm and dry. No rash noted. No erythema.   Psychiatric: He has a normal mood and affect. His behavior is normal. Thought content normal.   Vitals reviewed.    Assessment/Plan:     1. Intervertebral disc disorders with radiculopathy, lumbar region    2. Attention deficit hyperactivity disorder (ADHD), combined type         Antione was seen today for medication problem.    Diagnoses and all orders for this visit:    Intervertebral disc disorders with radiculopathy, lumbar region: This is a chronic, worsening problem. The patient was unable to take the Gabapentin. He reports that Dr. Vasquez is supposed to be referring his from lumbar injections to help with the pain. He has not heard back from anyone regarding this. Patient will contact his office to find out about the referral. If this referral has not been made, then I will refer the patient " to pain management for evaluation.     Attention deficit hyperactivity disorder (ADHD), combined type: Patient reports he was diagnosed as a kid and an adult as having ADHD. I have been unable to find these records. I am working with medical records to find these. If I am unable to find these, then he will need to be retested for ADHD. I discussed this with the patient, and he agrees to this plan.       Follow-up:     Return in about 3 months (around 9/7/2018).      Goals        Patient Stated    • Patient reports that he would like to have improvment of his memory.  (pt-stated)            Barriers to goal: None            Preventative:    Vaccines Recommended at this visit:   Influenza    Vaccines Received at this visit:  Patient refused all vaccinations at this visit.    Screenings Recommended at this visit:  No Screenings offered today. Patient is up to date on all screenings at this time.     Screenings Ordered at this visit:  No screenings were offered today. Patient is up to date on all screenings.     Smoking Status:  Patient has never smoked.    Alcohol Intake:  Occasional/rare    Patient's Body mass index is 23.85 kg/m². BMI is within normal parameters. No follow-up required.         RISK SCORE: 3              This document has been electronically signed by Memo Hall MD on June 7, 2018 4:02 PM

## 2018-06-08 ENCOUNTER — TELEPHONE (OUTPATIENT)
Dept: FAMILY MEDICINE CLINIC | Facility: CLINIC | Age: 23
End: 2018-06-08

## 2018-06-12 ENCOUNTER — TELEPHONE (OUTPATIENT)
Dept: FAMILY MEDICINE CLINIC | Facility: CLINIC | Age: 23
End: 2018-06-12

## 2018-06-14 ENCOUNTER — TELEPHONE (OUTPATIENT)
Dept: FAMILY MEDICINE CLINIC | Facility: CLINIC | Age: 23
End: 2018-06-14

## 2018-06-14 ENCOUNTER — HOSPITAL ENCOUNTER (OUTPATIENT)
Dept: NEUROLOGY | Facility: HOSPITAL | Age: 23
Discharge: HOME | End: 2018-06-14
Attending: PSYCHIATRY & NEUROLOGY | Admitting: PSYCHIATRY & NEUROLOGY

## 2018-06-14 DIAGNOSIS — R41.3 MEMORY DIFFICULTY: ICD-10-CM

## 2018-06-14 PROCEDURE — 95816 EEG AWAKE AND DROWSY: CPT

## 2018-06-14 PROCEDURE — 95816 EEG AWAKE AND DROWSY: CPT | Performed by: PSYCHIATRY & NEUROLOGY

## 2018-06-14 NOTE — PROGRESS NOTES
I have seen the patient.  I have reviewed the notes, assessments, and/or procedures performed by Dr. Hall, I concur with her/his documentation and assessment and plan for Antione Villaseñor.       This document has been electronically signed by Brown Johnson MD on June 14, 2018 11:12 AM

## 2018-06-15 ENCOUNTER — OFFICE VISIT (OUTPATIENT)
Dept: ORTHOPEDIC SURGERY | Facility: CLINIC | Age: 23
End: 2018-06-15

## 2018-06-15 ENCOUNTER — OFFICE VISIT (OUTPATIENT)
Dept: FAMILY MEDICINE CLINIC | Facility: CLINIC | Age: 23
End: 2018-06-15

## 2018-06-15 VITALS — HEART RATE: 78 BPM | BODY MASS INDEX: 24.48 KG/M2 | HEIGHT: 70 IN | OXYGEN SATURATION: 98 % | WEIGHT: 171 LBS

## 2018-06-15 VITALS — WEIGHT: 170 LBS | BODY MASS INDEX: 24.34 KG/M2 | HEIGHT: 70 IN

## 2018-06-15 DIAGNOSIS — M51.16 INTERVERTEBRAL DISC DISORDERS WITH RADICULOPATHY, LUMBAR REGION: ICD-10-CM

## 2018-06-15 DIAGNOSIS — M54.5 LOW BACK PAIN, UNSPECIFIED BACK PAIN LATERALITY, UNSPECIFIED CHRONICITY, WITH SCIATICA PRESENCE UNSPECIFIED: Primary | ICD-10-CM

## 2018-06-15 DIAGNOSIS — R41.3 MEMORY LOSS: Primary | ICD-10-CM

## 2018-06-15 DIAGNOSIS — F90.2 ATTENTION DEFICIT HYPERACTIVITY DISORDER (ADHD), COMBINED TYPE: ICD-10-CM

## 2018-06-15 PROCEDURE — 99213 OFFICE O/P EST LOW 20 MIN: CPT | Performed by: FAMILY MEDICINE

## 2018-06-15 PROCEDURE — 99213 OFFICE O/P EST LOW 20 MIN: CPT | Performed by: ORTHOPAEDIC SURGERY

## 2018-06-15 RX ORDER — METHYLPREDNISOLONE 4 MG/1
28 TABLET ORAL DAILY
Qty: 28 TABLET | Refills: 0 | Status: SHIPPED | OUTPATIENT
Start: 2018-06-15 | End: 2018-07-17

## 2018-06-15 RX ORDER — CYCLOBENZAPRINE HCL 10 MG
10 TABLET ORAL NIGHTLY PRN
Qty: 60 TABLET | Refills: 1 | Status: SHIPPED | OUTPATIENT
Start: 2018-06-15 | End: 2018-07-17

## 2018-06-15 NOTE — PROGRESS NOTES
"  Subjective:     Antione Villaseñor is a 22 y.o. male who presents for follow up for Memory loss:    Memory loss:   Patient has had continued difficulty with long and short term memory. He sees Dr. Christianson on July 7th, 2018. He has a MRI of brain in 2 weeks. He has a \"memory test\" in 2 weeks. Patient has a hx of ADHD for which he took stimulant medication from the time he was a child until the event with the steroids 3 years ago. He would like to be restarted on stimulant medication for his ADHD. He has not had testing for ADHD since he was a child. Patient reports that he was unable to make an appointment with Dr. Galicia due to his history of frequent \"no shows\". He was also told that he would not be able to be scheduled for testing until Sept. 2018.     Preventative:  Over the past 2 weeks, have you felt down, depressed, or hopeless?No   Over the past 2 weeks, have you felt little interest or pleasure in doing things?No  Clinical depression screening refused by patient.No     On osteoporosis therapy?Not Indicated     Past Medical Hx:  Past Medical History:   Diagnosis Date   • Ankle joint pain    • Bipolar disorder    • Burning feet syndrome    • Depressive disorder    • Dysuria     Dysuria - OVER ACTIVE BLADDER VS POLYURIA      • Encounter for general adult medical examination without abnormal findings    • Heart murmur    • Infertile male syndrome    • Intervertebral disc disorders with radiculopathy, lumbar region    • Low back pain    • Male hypogonadism     Male hypogonadism - not receiving testosterone replacement by urology      • Memory loss    • Pain in unspecified foot    • Palpitations        Past Surgical Hx:  Past Surgical History:   Procedure Laterality Date   • INJECTION OF MEDICATION      Kenalog (1)      10/05/2015           Health Maintenance:  Health Maintenance   Topic Date Due   • INFLUENZA VACCINE  08/01/2018   • ANNUAL PHYSICAL  06/08/2019   • TDAP/TD VACCINES (2 - Td) 01/01/2021 "       Current Meds:    Current Outpatient Prescriptions:   •  cyclobenzaprine (FLEXERIL) 10 MG tablet, Take 1 tablet by mouth At Night As Needed for Muscle Spasms., Disp: 60 tablet, Rfl: 1  •  MethylPREDNISolone (MEDROL, MAURO,) 4 MG tablet, Take 28 tablets by mouth Daily. Use as directed by package instructions, Disp: 28 tablet, Rfl: 0    Allergies:  Patient has no known allergies.    Family Hx:  Family History   Problem Relation Age of Onset   • Asthma Other    • Diabetes Other    • Heart disease Other    • Hypertension Other    • Lung disease Other    • Coronary artery disease Father    • Hypertension Father         Social History:  Social History     Social History   • Marital status:      Spouse name: N/A   • Number of children: N/A   • Years of education: N/A     Occupational History   • Not on file.     Social History Main Topics   • Smoking status: Never Smoker   • Smokeless tobacco: Never Used   • Alcohol use No   • Drug use: No   • Sexual activity: Yes     Partners: Female     Other Topics Concern   • Not on file     Social History Narrative   • No narrative on file       Review of Systems  Review of Systems   Constitutional: Negative for chills, fatigue and fever.   HENT: Negative for congestion, ear pain, hearing loss, rhinorrhea, sore throat and trouble swallowing.    Eyes: Negative for pain and visual disturbance.   Respiratory: Negative for cough, shortness of breath and wheezing.    Cardiovascular: Negative for chest pain, palpitations and leg swelling.   Gastrointestinal: Negative for abdominal pain, constipation, diarrhea, nausea and vomiting.   Genitourinary: Negative for dysuria and hematuria.   Musculoskeletal: Positive for back pain. Negative for neck pain.   Skin: Negative for rash and wound.   Neurological: Positive for headaches. Negative for dizziness.   Psychiatric/Behavioral: Positive for decreased concentration. Negative for dysphoric mood and sleep disturbance. The patient is  "nervous/anxious.        Objective:     Pulse 78   Ht 177.8 cm (70\")   Wt 77.6 kg (171 lb)   SpO2 98%   BMI 24.54 kg/m²   Physical Exam   Constitutional: He is oriented to person, place, and time. He appears well-developed and well-nourished.   HENT:   Head: Normocephalic and atraumatic.   Mouth/Throat: Oropharynx is clear and moist.   Eyes: Conjunctivae and EOM are normal. Pupils are equal, round, and reactive to light.   Neck: Normal range of motion. Neck supple. No tracheal deviation present. No thyromegaly present.   Cardiovascular: Normal rate, regular rhythm and normal heart sounds.  Exam reveals no gallop and no friction rub.    No murmur heard.  Pulmonary/Chest: Effort normal and breath sounds normal. No respiratory distress. He has no wheezes. He has no rales.   Abdominal: Soft. Bowel sounds are normal. He exhibits no distension. There is no tenderness.   Musculoskeletal: Normal range of motion. He exhibits tenderness.   Tenderness to palpation in lower back. He has decreased ROM in lumbar spine due to pain.    Lymphadenopathy:     He has no cervical adenopathy.   Neurological: He is alert and oriented to person, place, and time. No cranial nerve deficit.   Skin: Skin is warm and dry. No rash noted. No erythema.   Psychiatric: He has a normal mood and affect. His behavior is normal. Thought content normal.   Vitals reviewed.    Assessment/Plan:     1. Memory loss    2. Attention deficit hyperactivity disorder (ADHD), combined type         Diagnoses and all orders for this visit:    Memory loss and Attention deficit hyperactivity disorder (ADHD), combined type: This is a chronic problem. I discussed that he will have to be retested before being started on stimulant medication for his ADHD. I discussed that he needs to have completed the workup with neurology and be cleared by them before being started on stimulant medication. He would like to go ahead and be tested. He gave verbal understanding that I " would not be starting him on stimulant medication until the neuro work up is complete, and no cause of his memory loss is found.       Follow-up:     Return in about 3 months (around 9/15/2018).      Goals        Patient Stated    • Patient reports that he would like to have improvment of his memory.  (pt-stated)            Barriers to goal: None            Preventative:    Vaccines Recommended at this visit:   Influenza    Vaccines Received at this visit:  Patient refused all vaccinations at this visit.    Screenings Recommended at this visit:  No Screenings offered today. Patient is up to date on all screenings at this time.     Screenings Ordered at this visit:  No screenings were offered today. Patient is up to date on all screenings.     Smoking Status:  Patient has never smoked.    Alcohol Intake:  Patient does not drink    Patient's Body mass index is 24.54 kg/m². BMI is within normal parameters. No follow-up required.         RISK SCORE: 3              This document has been electronically signed by Memo Hall MD on June 21, 2018 10:03 AM

## 2018-06-15 NOTE — PROGRESS NOTES
"Antione Villaseñor is a 22 y.o. male returns for     Chief Complaint   Patient presents with   • Lumbar Spine - Follow-up       HISTORY OF PRESENT ILLNESS: Patient being seen for lumbar spine follow up.     Complains of constant pain, radiates to left ankle.  The pain is dull, abrupt.  States at times he cannot walk because of pain.  The pain is worse with bending to pick something up.  He has difficulty working with cable installation.  The pain has been present for 6 months.              CONCURRENT MEDICAL HISTORY:    Past Medical History:   Diagnosis Date   • Ankle joint pain    • Bipolar disorder    • Burning feet syndrome    • Depressive disorder    • Dysuria     Dysuria - OVER ACTIVE BLADDER VS POLYURIA      • Encounter for general adult medical examination without abnormal findings    • Heart murmur    • Infertile male syndrome    • Intervertebral disc disorders with radiculopathy, lumbar region    • Low back pain    • Male hypogonadism     Male hypogonadism - not receiving testosterone replacement by urology      • Memory loss    • Pain in unspecified foot    • Palpitations        No Known Allergies      Current Outpatient Prescriptions:   •  cyclobenzaprine (FLEXERIL) 10 MG tablet, Take 1 tablet by mouth At Night As Needed for Muscle Spasms., Disp: 60 tablet, Rfl: 1  •  MethylPREDNISolone (MEDROL, MAURO,) 4 MG tablet, Take 28 tablets by mouth Daily. Use as directed by package instructions, Disp: 28 tablet, Rfl: 0    Past Surgical History:   Procedure Laterality Date   • INJECTION OF MEDICATION      Kenalog (1)      10/05/2015           ROS  No fevers or chills.  No chest pain or shortness of air.  No GI or  disturbances.    PHYSICAL EXAMINATION:       Ht 177.8 cm (70\")   Wt 77.1 kg (170 lb)   BMI 24.39 kg/m²     Physical Exam   Constitutional: He appears well-developed.   HENT:   Head: Normocephalic and atraumatic.   Eyes: Pupils are equal, round, and reactive to light. Right eye exhibits no discharge. " Left eye exhibits no discharge.   Neck: Normal range of motion. No JVD present. No tracheal deviation present. No thyromegaly present.   Cardiovascular: Normal rate, regular rhythm, normal heart sounds and intact distal pulses.  Exam reveals no gallop and no friction rub.    No murmur heard.  Pulmonary/Chest: Effort normal and breath sounds normal. No respiratory distress. He has no wheezes. He has no rales. He exhibits no tenderness.   Abdominal: Soft. Bowel sounds are normal. He exhibits no distension. There is no tenderness. There is no guarding.   Musculoskeletal: He exhibits no edema, tenderness or deformity.   Lymphadenopathy:     He has no cervical adenopathy.   Neurological: He is alert. He has normal reflexes. He displays normal reflexes. No cranial nerve deficit. Coordination normal.   Skin: Skin is warm. No rash noted. No erythema.   Psychiatric: He has a normal mood and affect. His behavior is normal. Thought content normal.       GAIT:     [x]  Normal  []  Antalgic    Assistive device: [x]  None  []  Walker     []  Crutches  []  Cane     []  Wheelchair  []  Stretcher    Right Ankle Exam     Muscle Strength   Dorsiflexion:  5/5  Plantar flexion:  5/5  Anterior tibial:  5/5  Posterior tibial:  5/5  Gastrocsoleus:  5/5  Peroneal muscle:  5/5      Left Ankle Exam     Muscle Strength   Dorsiflexion:  5/5   Plantar flexion:  5/5   Anterior tibial:  5/5   Posterior tibial:  5/5  Gastrocsoleus:  5/5  Peroneal muscle:  5/5      Right Hip Exam     Range of Motion   Internal Rotation: normal   External Rotation: normal     Muscle Strength   Abduction: 5/5   Flexion: 5/5       Left Hip Exam     Range of Motion   Internal Rotation: normal   External Rotation: normal     Muscle Strength   Abduction: 5/5   Flexion: 5/5       Back Exam     Tenderness   The patient is experiencing no tenderness.     Range of Motion   Extension: 30   Flexion:  90 normal   Lateral Bend Right: 20   Lateral Bend Left: 20   Rotation Right:  30   Rotation Left: 30     Muscle Strength   Right Quadriceps:  5/5   Left Quadriceps:  5/5   Right Hamstrings:  5/5   Left Hamstrings:  5/5     Tests   Straight leg raise right: negative  Straight leg raise left: negative    Reflexes   Patellar: 2/4  Achilles: 2/4  Biceps: 2/4  Babinski's sign: normal     Other   Sensation: normal  Gait: normal   Erythema: no back redness  Scars: absent                MRI lumbar spine without contrast on 9/28/2017      CLINICAL INDICATION: Low back pain, degenerative disc disease  with radiculopathy, pain radiates down both legs     TECHNIQUE: Multiplanar, multisequence MR images are obtained  throughout the lumbar spine without the administration of  contrast.   This examination was performed on a 1.5 Susy magnet.     COMPARISON: None     FINDINGS: There is somewhat of a transitional lumbosacral  vertebral body. Iliolumbar ligaments are visualized on axial  T2-weighted image 26 labeling this as L5. This makes some very  partial lumbarization of S1 with a very rudimentary S1-S2 disc.  Tiny Schmorl's node formation is noted in the upper lumbar and  lower thoracic spine. Discs relatively maintain their normal  height and signal. Vertebral body height, alignment and signal  intensity is otherwise unremarkable. There is normal signal  within the conus which terminates at a normal level. There are no  disc herniations. There are no levels of significant canal  stenosis or foraminal narrowing.     IMPRESSION:  Essentially unremarkable examination without  significant canal stenosis or foraminal narrowing.     Electronically signed by:  Lenin Li  9/28/2017 8:59 PM  CDT Workstation: [id number] reviewed the MRI of lumbar spine performed 9/28/17, no disc herniation, no obvious compressive pathology identified.      ASSESSMENT:    Diagnoses and all orders for this visit:    Low back pain, unspecified back pain laterality, unspecified chronicity, with sciatica presence  unspecified    Intervertebral disc disorders with radiculopathy, lumbar region    Other orders  -     MethylPREDNISolone (MEDROL, MAURO,) 4 MG tablet; Take 28 tablets by mouth Daily. Use as directed by package instructions  -     cyclobenzaprine (FLEXERIL) 10 MG tablet; Take 1 tablet by mouth At Night As Needed for Muscle Spasms.          PLAN    Medrol dose pack is ordered, and flexeril.  Continue non operative treatment.      Mg Vasquez MD

## 2018-06-20 ENCOUNTER — HOSPITAL ENCOUNTER (OUTPATIENT)
Dept: SPEECH THERAPY | Facility: HOSPITAL | Age: 23
Setting detail: THERAPIES SERIES
Discharge: HOME | End: 2018-06-20

## 2018-06-20 DIAGNOSIS — R41.841 COGNITIVE COMMUNICATION DEFICIT: Primary | ICD-10-CM

## 2018-06-20 PROCEDURE — G9169 MEMORY GOAL STATUS: HCPCS | Performed by: SPEECH-LANGUAGE PATHOLOGIST

## 2018-06-20 PROCEDURE — 92523 SPEECH SOUND LANG COMPREHEN: CPT | Performed by: SPEECH-LANGUAGE PATHOLOGIST

## 2018-06-20 PROCEDURE — G9168 MEMORY CURRENT STATUS: HCPCS | Performed by: SPEECH-LANGUAGE PATHOLOGIST

## 2018-06-20 PROCEDURE — G9170 MEMORY D/C STATUS: HCPCS | Performed by: SPEECH-LANGUAGE PATHOLOGIST
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
## 2018-06-28 ENCOUNTER — HOSPITAL ENCOUNTER (OUTPATIENT)
Dept: MRI IMAGING | Facility: HOSPITAL | Age: 23
Discharge: HOME | End: 2018-06-28
Attending: PSYCHIATRY & NEUROLOGY | Admitting: PSYCHIATRY & NEUROLOGY

## 2018-06-28 DIAGNOSIS — R41.3 MEMORY DIFFICULTY: ICD-10-CM

## 2018-06-28 PROCEDURE — 70551 MRI BRAIN STEM W/O DYE: CPT

## 2018-07-02 ENCOUNTER — TELEPHONE (OUTPATIENT)
Dept: ORTHOPEDIC SURGERY | Facility: CLINIC | Age: 23
End: 2018-07-02

## 2018-07-06 DIAGNOSIS — M51.16 INTERVERTEBRAL DISC DISORDERS WITH RADICULOPATHY, LUMBAR REGION: Primary | ICD-10-CM

## 2018-07-12 ENCOUNTER — OFFICE VISIT (OUTPATIENT)
Dept: BEHAVIORAL HEALTH | Facility: CLINIC | Age: 23
End: 2018-07-12

## 2018-07-12 DIAGNOSIS — F90.2 ATTENTION DEFICIT HYPERACTIVITY DISORDER (ADHD), COMBINED TYPE: Primary | ICD-10-CM

## 2018-07-12 PROCEDURE — 90791 PSYCH DIAGNOSTIC EVALUATION: CPT | Performed by: PSYCHOLOGIST

## 2018-07-12 NOTE — PROGRESS NOTES
7/12/2018    Antione Villaseñor, a 22 y.o. male, was seen today for initial appointment lasting 45 minutes.  Patient is referred by Memo Hall MD .     SUBJECTIVE:  This patient was previously diagnosed with ADHD at about age 2.  He was treated with Adderall and then because of family turmoil his medication was discussed continued.  He was reevaluated and put back on medicine at age 16.  Then he was taken off the stimulant at the advice of his neurologist following a bad reaction to a steroid shot.  Patient now reports she did like to get back on stimulant medication for his ADHD.  With stimulant medicine his memory is better, his focus is better, he doesn't lose track in conversations, and his OCD is improved.  He's been on a variety of antidepressants none of which worked well for him.  This patient is  with a young daughter, and works at [employer].    FAMILY HISTORY:  Family history shows ADHD and his mother    Patient was born and raised here in [facility].  His difficult childhood in a dysfunctional family.  At age 11 he was taken in by a non-relative family.  He did well in school making mostly A's.  Then family issues took his focus away from school.  Graduated from [employer], he has an associates degree in science.    MENTAL STATUS:  Patient presents as a young man who looks his stated age.  He is oriented ×3, thoughts are goal-directed and logical.  There is no evidence of substance abuse disorder or a personality disorder.  Patient reported that he tends to be scatterbrained, starts things without finishing them, his mind tends to wander when he is trying to concentrate, he is forgetful, and he is a procrastinator.  Sleeps well at night tends to feel tired during the day.  He denies depression.  But he does have occasional anxiety.  He has some mild OCD type thoughts.    DIAGNOSIS:    ICD-10-CM ICD-9-CM   1. Attention deficit hyperactivity disorder (ADHD), combined type  F90.2 314.01       ASSESSMENT PLAN:  Plan is to evaluate for ADHD and mood disorder.  He was given [facility] rating scales for he and his wife to complete and I'll testing with the [place] computerized continuous performance test          This document has been electronically signed by Brice Galicia EdD on July 12, 2018 9:24 AM

## 2018-07-16 ENCOUNTER — OFFICE VISIT (OUTPATIENT)
Dept: BEHAVIORAL HEALTH | Facility: CLINIC | Age: 23
End: 2018-07-16

## 2018-07-16 DIAGNOSIS — F90.0 ADHD, PREDOMINANTLY INATTENTIVE TYPE: Primary | ICD-10-CM

## 2018-07-16 PROCEDURE — 90834 PSYTX W PT 45 MINUTES: CPT | Performed by: PSYCHOLOGIST

## 2018-07-16 NOTE — PROGRESS NOTES
7/16/2018    Antione Villaseñor, a 22 y.o. male, was seen today for psychological evaluation lasting 45 minutes.  Patient is referred by Memo Hall MD .     SUBJECTIVE:  This patient was previously diagnosed with ADHD at about age 2.  He was treated with Adderall and then because of family turmoil his medication was discussed continued.  He was reevaluated and put back on medicine at age 16.  Then he was taken off the stimulant at the advice of his neurologist following a bad reaction to a steroid shot.  Patient now reports he would like to get back on stimulant medication for his ADHD.  With stimulant medicine his memory is better, his focus is better, he doesn't lose track in conversations, and his OCD is improved.  He's been on a variety of antidepressants none of which worked well for him.  This patient is  with a young daughter, and works at [employer].    FAMILY HISTORY:  Family history shows ADHD and his mother    Patient was born and raised here in [facility].  His difficult childhood in a dysfunctional family.  At age 11 he was taken in by a non-relative family.  He did well in school making mostly A's.  Then family issues took his focus away from school.  Graduated from [employer], he has an associates degree in science.    MENTAL STATUS:  Patient presents as a young man who looks his stated age.  He is oriented ×3, thoughts are goal-directed and logical.  There is no evidence of substance abuse disorder or a personality disorder.  Patient reported that he tends to be scatterbrained, starts things without finishing them, his mind tends to wander when he is trying to concentrate, he is forgetful, and he is a procrastinator.  Sleeps well at night, tends to feel tired during the day.  He denies depression.  But he does have occasional anxiety.  He has some mild OCD type thoughts.    This evaluation consisted of psychological testing with the [place] computerized continuous  performance test, and [facility] rating scales completed by patient and his wife.  The [facility] requires the patient to click a mouse button for certain visual and auditory targets displayed on the computer.  In addition, the patient has to refrain from clicking on the mouse button for visual and auditory distractor non-targets.  Scores on the Thomas have a mean of 100 and a standard deviation of 15 points, any score of 80 or lower identifies a significant problem area.  The patient's scores were impulsivity 86, attention 0, hyperactivity 106.  The test results show that the patient made errors by not responding when a response was called for, indicating inattention.   The patient lost focus several times when his/her attention tended to wander.  There was a lot of inconsistency in the patient's response times, also showing fluctuating attention.      On the adult rating scales neither the patient or his wife reported significant symptoms of ADHD.  Neither did they report significant symptoms for mood disorder.  However, he had extreme problems with his focus and distractibility while taking the computer test.  By opinion that he meets diagnostic criteria for attention deficit hyperactivity disorder primarily inattentive.         DIAGNOSIS:    ICD-10-CM ICD-9-CM   1. ADHD, predominantly inattentive type F90.0 314.00       ASSESSMENT PLAN:  Recommendation is for him to see his physician for consideration of treatment with appropriate stimulant medication.          This document has been electronically signed by Brice Galicia EdD on July 16, 2018 5:24 PM

## 2018-07-17 ENCOUNTER — OFFICE VISIT (OUTPATIENT)
Dept: FAMILY MEDICINE CLINIC | Facility: CLINIC | Age: 23
End: 2018-07-17

## 2018-07-17 ENCOUNTER — APPOINTMENT (OUTPATIENT)
Dept: LAB | Facility: HOSPITAL | Age: 23
End: 2018-07-17

## 2018-07-17 VITALS
BODY MASS INDEX: 25.43 KG/M2 | SYSTOLIC BLOOD PRESSURE: 132 MMHG | HEART RATE: 81 BPM | HEIGHT: 70 IN | WEIGHT: 177.6 LBS | OXYGEN SATURATION: 100 % | DIASTOLIC BLOOD PRESSURE: 84 MMHG

## 2018-07-17 DIAGNOSIS — F90.2 ATTENTION DEFICIT HYPERACTIVITY DISORDER (ADHD), COMBINED TYPE: Primary | ICD-10-CM

## 2018-07-17 LAB
AMPHET+METHAMPHET UR QL: NEGATIVE
BARBITURATES UR QL SCN: NEGATIVE
BENZODIAZ UR QL SCN: NEGATIVE
CANNABINOIDS SERPL QL: NEGATIVE
COCAINE UR QL: NEGATIVE
METHADONE UR QL SCN: NEGATIVE
OPIATES UR QL: NEGATIVE
OXYCODONE UR QL SCN: NEGATIVE

## 2018-07-17 PROCEDURE — 80307 DRUG TEST PRSMV CHEM ANLYZR: CPT | Performed by: FAMILY MEDICINE

## 2018-07-17 PROCEDURE — 99213 OFFICE O/P EST LOW 20 MIN: CPT | Performed by: FAMILY MEDICINE

## 2018-07-17 RX ORDER — DEXTROAMPHETAMINE SACCHARATE, AMPHETAMINE ASPARTATE, DEXTROAMPHETAMINE SULFATE AND AMPHETAMINE SULFATE 2.5; 2.5; 2.5; 2.5 MG/1; MG/1; MG/1; MG/1
10 TABLET ORAL 2 TIMES DAILY
Qty: 60 TABLET | Refills: 0 | Status: SHIPPED | OUTPATIENT
Start: 2018-07-17 | End: 2018-08-14 | Stop reason: SDUPTHER

## 2018-07-17 NOTE — PROGRESS NOTES
I have seen the patient.  I have reviewed the notes, assessments, and/or procedures performed by Dr Hall, I agree with her/his documentation and assessment and plan for Antione Villaseñor.

## 2018-07-17 NOTE — PROGRESS NOTES
Subjective:     Antione Villaseñor is a 22 y.o. male who presents for initial evaluation for ADHD:     ADHD:   Patient has a history being diagnosed with ADHD medication in the past.  Patient has been treated with ADHD meds. He tried multiple stimulant medications in the past. He took Adderall short acting in the past, At that time he was taking Adderall 20 mg BID. He did not do well on the Extended release tablets. It did seem to wear off. He works at the [employer].Patient has recently underwent evaluation for memory problems.  This started approximately 2 years ago after using testosterone purchased on-line.  After using this patient began having problems with short-term memory, confusion, and worsening of his ability to focus.  He stopped his to medication at that time.  All the workup to this point has been negative.      Bilateral ear pain:    Left ear is hurting now. He had loss of voice. This has improved. He has seasonal allergies. He has had increased mucus production over the last week. His throat and mouth has been dry for the las tweek, worse at night. He has taken Mucinex which has helped.     Preventative:  Over the past 2 weeks, have you felt down, depressed, or hopeless?No   Over the past 2 weeks, have you felt little interest or pleasure in doing things?No  Clinical depression screening refused by patient.No     On osteoporosis therapy?Not Indicated     Past Medical Hx:  Past Medical History:   Diagnosis Date   • Ankle joint pain    • Bipolar disorder (CMS/[facility])    • Burning feet syndrome    • Depressive disorder    • Dysuria     Dysuria - OVER ACTIVE BLADDER VS POLYURIA      • Encounter for general adult medical examination without abnormal findings    • Heart murmur    • Infertile male syndrome    • Intervertebral disc disorders with radiculopathy, lumbar region    • Low back pain    • Male hypogonadism     Male hypogonadism - not receiving testosterone replacement by urology      • Memory  loss    • Pain in unspecified foot    • Palpitations        Past Surgical Hx:  Past Surgical History:   Procedure Laterality Date   • INJECTION OF MEDICATION      Kenalog (1)      10/05/2015           Health Maintenance:  Health Maintenance   Topic Date Due   • INFLUENZA VACCINE  08/01/2018   • ANNUAL PHYSICAL  06/08/2019   • TDAP/TD VACCINES (2 - Td) 01/01/2021       Current Meds:    Current Outpatient Prescriptions:   •  amphetamine-dextroamphetamine (ADDERALL) 10 MG tablet, Take 1 tablet by mouth 2 (Two) Times a Day., Disp: 60 tablet, Rfl: 0  •  neomycin-polymyxin-hydrocortisone (CORTISPORIN) 3.5-81627-5 otic solution, Administer 3 drops into ears 4 (Four) Times a Day for 7 days., Disp: 5 mL, Rfl: 0    Allergies:  Patient has no known allergies.    Family Hx:  Family History   Problem Relation Age of Onset   • Asthma Other    • Diabetes Other    • Heart disease Other    • Hypertension Other    • Lung disease Other    • Coronary artery disease Father    • Hypertension Father         Social History:  Social History     Social History   • Marital status:      Spouse name: N/A   • Number of children: N/A   • Years of education: N/A     Occupational History   • Not on file.     Social History Main Topics   • Smoking status: Never Smoker   • Smokeless tobacco: Never Used   • Alcohol use No   • Drug use: No   • Sexual activity: Yes     Partners: Female     Other Topics Concern   • Not on file     Social History Narrative   • No narrative on file       Review of Systems  Review of Systems   Constitutional: Negative for chills and fever.   HENT: Positive for congestion, ear pain, rhinorrhea and sore throat. Negative for dental problem, hearing loss and trouble swallowing.    Eyes: Negative for pain and visual disturbance.   Respiratory: Positive for cough. Negative for shortness of breath.    Cardiovascular: Negative for chest pain and palpitations.   Gastrointestinal: Negative for abdominal pain, constipation,  "diarrhea, nausea and vomiting.   Genitourinary: Negative for dysuria and hematuria.   Musculoskeletal: Positive for back pain. Negative for neck pain.   Skin: Negative for rash and wound.   Neurological: Negative for dizziness, weakness and headaches.   Psychiatric/Behavioral: Positive for decreased concentration. Negative for dysphoric mood. The patient is not nervous/anxious.        Objective:     /84   Pulse 81   Ht 177.8 cm (70\")   Wt 80.6 kg (177 lb 9.6 oz)   SpO2 100%   BMI 25.48 kg/m²   Physical Exam   Constitutional: He is oriented to person, place, and time. He appears well-developed and well-nourished.   HENT:   Head: Normocephalic and atraumatic.   Mouth/Throat: Oropharynx is clear and moist.   Eyes: Pupils are equal, round, and reactive to light. Conjunctivae and EOM are normal.   Neck: Normal range of motion. Neck supple. No tracheal deviation present. No thyromegaly present.   Cardiovascular: Normal rate, regular rhythm and normal heart sounds.  Exam reveals no gallop and no friction rub.    No murmur heard.  Pulmonary/Chest: Effort normal and breath sounds normal. No respiratory distress. He has no wheezes. He has no rales.   Abdominal: Soft. Bowel sounds are normal. He exhibits no distension. There is no tenderness.   Musculoskeletal: He exhibits tenderness.   Patient has mild tenderness in lower back.  Patient has decreased range of motion of her back in all directions secondary to pain.   Lymphadenopathy:     He has no cervical adenopathy.   Neurological: He is alert and oriented to person, place, and time. No cranial nerve deficit.   Skin: Skin is warm and dry. No rash noted. No erythema.   Psychiatric: He has a normal mood and affect. His behavior is normal. Thought content normal.   Vitals reviewed.    Assessment/Plan:     Antione was seen today for adhd.    Diagnoses and all orders for this visit:    Attention deficit hyperactivity disorder (ADHD), combined type: This is a new " problem.  Patient has undergone evaluation for ADHD by Dr. Galicia.  I will order a urine drug screen as below.  I will start the patient on Adderall 10 mg 1 tablet twice daily as below.  Patient signed a controlled prescription agreement today.  -     amphetamine-dextroamphetamine (ADDERALL) 10 MG tablet; Take 1 tablet by mouth 2 (Two) Times a Day.  -     Cancel: Urine Drug Screen - Urine, Clean Catch; Future  -     Urine Drug Screen - Urine, Clean Catch        -     [facility] request number [id number].  This has been reviewed and found to be appropriate.        Follow-up:     Return in about 4 weeks (around 8/14/2018).      Goals        Patient Stated    • Patient reports that he would like to have improvment of his memory.  (pt-stated)            Barriers to goal: None            Preventative:    Vaccines Recommended at this visit:   No Vaccines recommended today. Patient is up to date on all vaccines.     Vaccines Received at this visit:  No Vaccines recommended today. Patient is up to date on all vaccines.     Screenings Recommended at this visit:  No Screenings offered today. Patient is up to date on all screenings at this time.     Screenings Ordered at this visit:  No screenings were offered today. Patient is up to date on all screenings.     Smoking Status:  Patient has never smoked.    Alcohol Intake:  Patient does not drink    Patient's Body mass index is 25.48 kg/m². BMI is within normal parameters. No follow-up required.         RISK SCORE: 3              This document has been electronically signed by Memo Hall MD on July 23, 2018 8:25 AM

## 2018-08-14 ENCOUNTER — APPOINTMENT (OUTPATIENT)
Dept: LAB | Facility: HOSPITAL | Age: 23
End: 2018-08-14

## 2018-08-14 ENCOUNTER — OFFICE VISIT (OUTPATIENT)
Dept: FAMILY MEDICINE CLINIC | Facility: CLINIC | Age: 23
End: 2018-08-14

## 2018-08-14 VITALS
HEIGHT: 70 IN | WEIGHT: 177 LBS | OXYGEN SATURATION: 98 % | DIASTOLIC BLOOD PRESSURE: 90 MMHG | HEART RATE: 106 BPM | SYSTOLIC BLOOD PRESSURE: 152 MMHG | BODY MASS INDEX: 25.34 KG/M2

## 2018-08-14 DIAGNOSIS — F90.2 ATTENTION DEFICIT HYPERACTIVITY DISORDER (ADHD), COMBINED TYPE: Primary | ICD-10-CM

## 2018-08-14 PROCEDURE — G0481 DRUG TEST DEF 8-14 CLASSES: HCPCS | Performed by: STUDENT IN AN ORGANIZED HEALTH CARE EDUCATION/TRAINING PROGRAM

## 2018-08-14 PROCEDURE — 99213 OFFICE O/P EST LOW 20 MIN: CPT | Performed by: STUDENT IN AN ORGANIZED HEALTH CARE EDUCATION/TRAINING PROGRAM

## 2018-08-14 PROCEDURE — 80307 DRUG TEST PRSMV CHEM ANLYZR: CPT | Performed by: STUDENT IN AN ORGANIZED HEALTH CARE EDUCATION/TRAINING PROGRAM

## 2018-08-14 RX ORDER — DEXTROAMPHETAMINE SACCHARATE, AMPHETAMINE ASPARTATE, DEXTROAMPHETAMINE SULFATE AND AMPHETAMINE SULFATE 5; 5; 5; 5 MG/1; MG/1; MG/1; MG/1
20 TABLET ORAL 2 TIMES DAILY
Qty: 60 TABLET | Refills: 0 | Status: SHIPPED | OUTPATIENT
Start: 2018-08-14 | End: 2018-09-21 | Stop reason: SDUPTHER

## 2018-08-14 RX ORDER — DEXTROAMPHETAMINE SACCHARATE, AMPHETAMINE ASPARTATE, DEXTROAMPHETAMINE SULFATE AND AMPHETAMINE SULFATE 2.5; 2.5; 2.5; 2.5 MG/1; MG/1; MG/1; MG/1
20 TABLET ORAL 2 TIMES DAILY
Qty: 60 TABLET | Refills: 0 | Status: SHIPPED | OUTPATIENT
Start: 2018-08-14 | End: 2018-08-14

## 2018-08-14 NOTE — PATIENT INSTRUCTIONS
Living With Attention Deficit Hyperactivity Disorder  If you have been diagnosed with attention deficit hyperactivity disorder (ADHD), you may be relieved that you now know why you have felt or behaved a certain way. Still, you may feel overwhelmed about the treatment ahead. You may also wonder how to get the support you need and how to deal with the condition day-to-day. With treatment and support, you can live with ADHD and manage your symptoms.  How to manage lifestyle changes  Managing stress  Stress is your body's reaction to life changes and events, both good and bad. To cope with the stress of an ADHD diagnosis, it may help to:  · Learn more about ADHD.  · Exercise regularly. Even a short daily walk can lower stress levels.  · Participate in training or education programs (including social skills training classes) that teach you to deal with symptoms.    Medicines  Your health care provider may suggest certain medicines if he or she feels that they will help to improve your condition. Stimulant medicines are usually prescribed to treat ADHD, and therapy may also be prescribed. It is important to:  · Avoid using alcohol and other substances that may prevent your medicines from working properly (may interact).   · Talk with your pharmacist or health care provider about all the medicines that you take, their possible side effects, and what medicines are safe to take together.  · Make it your goal to take part in all treatment decisions (shared decision-making). Ask about possible side effects of medicines that your health care provider recommends, and tell him or her how you feel about having those side effects. It is best if shared decision-making with your health care provider is part of your total treatment plan.    Relationships  To strengthen your relationships with family members while treating your condition, consider taking part in family therapy. You might also attend self-help groups alone or with a  loved one.  Be honest about how your symptoms affect your relationships. Make an effort to communicate respectfully instead of fighting, and find ways to show others that you care. Psychotherapy may be useful in helping you cope with how ADHD affects your relationships.  How to recognize changes in your condition  The following signs may mean that your treatment is working well and your condition is improving:  · Consistently being on time for appointments.  · Being more organized at home and work.  · Other people noticing improvements in your behavior.  · Achieving goals that you set for yourself.  · Thinking more clearly.    The following signs may mean that your treatment is not working very well:  · Feeling impatience or more confusion.  · Missing, forgetting, or being late for appointments.  · An increasing sense of disorganization and messiness.  · More difficulty in reaching goals that you set for yourself.  · Loved ones becoming angry or frustrated with you.    Where to find support  Talking to others  · Keep emotion out of important discussions and speak in a calm, logical way.  · Listen closely and patiently to your loved ones. Try to understand their point of view, and try to avoid getting defensive.  · Take responsibility for the consequences of your actions.  · Ask that others do not take your behaviors personally.  · Aim to solve problems as they come up, and express your feelings instead of bottling them up.  · Talk openly about what you need from your loved ones and how they can support you.  · Consider going to family therapy sessions or having your family meet with a specialist who deals with ADHD-related behavior problems.  Finances  Not all insurance plans cover mental health care, so it is important to check with your insurance carrier. If paying for co-pays or counseling services is a problem, search for a [facility] or [facility] mental health care center. Public mental health care services may be  offered there at a low cost or no cost when you are not able to see a private health care provider.  If you are taking medicine for ADHD, you may be able to get the generic form, which may be less expensive than brand-name medicine. Some makers of prescription medicines also offer help to patients who cannot afford the medicines that they need.  Follow these instructions at home:  · Take over-the-counter and prescription medicines only as told by your health care provider. Check with your health care provider before taking any new medicines.  · Create structure and an organized atmosphere at home. For example:  ? Make a list of tasks, then rank them from most important to least important. Work on one task at a time until your listed tasks are done.  ? Make a daily schedule and follow it consistently every day.  ? Use an appointment calendar, and check it 2 or 3 times a day to keep on track. Keep it with you when you leave the house.  ? Create spaces where you keep certain things, and always put things back in their places after you use them.  · Keep all follow-up visits as told by your health care provider. This is important.  Questions to ask your health care provider:  · What are the risks and benefits of taking medicines?  · Would I benefit from therapy?  · How often should I follow up with a health care provider?  Contact a health care provider if:  · You have side effects from your medicines, such as:  ? Repeated muscle twitches, coughing, or speech outbursts.  ? Sleep problems.  ? Loss of appetite.  ? Depression.  ? New or worsening behavior problems.  ? Dizziness.  ? Unusually fast heartbeat.  ? Stomach pains.  ? Headaches.  Get help right away if:  · You have a severe reaction to a medicine.  · Your behavior suddenly gets worse.  Summary  · With treatment and support, you can live with ADHD and manage your symptoms.  · The medicines that are most often prescribed for ADHD are stimulants.  · Consider taking  part in family therapy or self-help groups with family members or friends.  · When you talk with friends and family about your ADHD, be patient and communicate openly.  · Take over-the-counter and prescription medicines only as told by your health care provider. Check with your health care provider before taking any new medicines.  This information is not intended to replace advice given to you by your health care provider. Make sure you discuss any questions you have with your health care provider.  Document Released: 04/19/2018 Document Revised: 04/19/2018 Document Reviewed: 04/19/2018  [employer] Interactive Patient Education © 2018 [employer]

## 2018-08-14 NOTE — PROGRESS NOTES
Family Medicine Residency   Cesilia Kim MD    Antione Villaseñor is a 22 y.o. male who presents to family medicine residency clinic for the following:    PROBLEM LIST:  1. ADHD  2. Anxiety  3. Bipolar  4. Depressive Disorder  5. Memory Loss  6. Heart Murmur  7. Hypogonadism  8. Back Pain     ADHD  Antione presents today for follow up for ADHD. He has gone under evaluation for ADHD by Dr. Galicia. He has taken multiple different ADHD medications in the past, and he has found that the extended release does not do well for him because they tend to wear off. He is working at the [employer]. He was started on Adderall 10mg tablets by Dr. Hall in July. He states that work, but they wear off after an hour. He was previously on 20mg BID in the past. He has not had any issues with appetite or insomnia. There were a couple of days he would take two of the 10mg tablets and it worked a lot better for him. He has dry mouth, and he states that is common when he takes Adderall. He chews gum and that helps.             Past Medical Hx:   Past Medical History:   Diagnosis Date   • Ankle joint pain    • Bipolar disorder (CMS/HCC)    • Burning feet syndrome    • Depressive disorder    • Dysuria     Dysuria - OVER ACTIVE BLADDER VS POLYURIA      • Encounter for general adult medical examination without abnormal findings    • Heart murmur    • Infertile male syndrome    • Intervertebral disc disorders with radiculopathy, lumbar region    • Low back pain    • Male hypogonadism     Male hypogonadism - not receiving testosterone replacement by urology      • Memory loss    • Pain in unspecified foot    • Palpitations        Past Surgical Hx:  Past Surgical History:   Procedure Laterality Date   • INJECTION OF MEDICATION      Kenalog (1)      10/05/2015           Current Meds:    Current Outpatient Prescriptions:   •  amphetamine-dextroamphetamine (ADDERALL) 20 MG tablet, Take 1 tablet by mouth 2 (Two) Times a Day.,  Disp: 60 tablet, Rfl: 0    Allergies:  Patient has no known allergies.    Family Hx:  Family History   Problem Relation Age of Onset   • Asthma Other    • Diabetes Other    • Heart disease Other    • Hypertension Other    • Lung disease Other    • Coronary artery disease Father    • Hypertension Father         Social History:  Social History     Social History   • Marital status:      Spouse name: N/A   • Number of children: N/A   • Years of education: N/A     Occupational History   • Not on file.     Social History Main Topics   • Smoking status: Never Smoker   • Smokeless tobacco: Never Used   • Alcohol use No   • Drug use: No   • Sexual activity: Yes     Partners: Female     Other Topics Concern   • Not on file     Social History Narrative   • No narrative on file       Review of Systems  Review of Systems   Constitutional: Negative for chills, diaphoresis and fever.   HENT: Negative for sneezing and sore throat.    Eyes: Negative for pain and discharge.   Respiratory: Negative for cough and shortness of breath.    Gastrointestinal: Negative for constipation, diarrhea, nausea and vomiting.   Endocrine: Negative for cold intolerance and heat intolerance.   Genitourinary: Negative for difficulty urinating, dysuria, frequency and urgency.   Musculoskeletal: Negative for arthralgias and myalgias.   Skin: Negative for color change and pallor.   Allergic/Immunologic: Negative for environmental allergies and food allergies.   Neurological: Negative for dizziness, syncope and weakness.   Psychiatric/Behavioral: Negative for confusion and sleep disturbance.       Physical Exam:  Vitals:    08/14/18 1107   BP: 152/90   Pulse: 106   SpO2: 98%       Body mass index is 25.4 kg/m².   Physical Exam   Constitutional: He is oriented to person, place, and time. He appears well-developed and well-nourished. No distress.   HENT:   Head: Normocephalic and atraumatic.   Nose: Nose normal.   Eyes: Conjunctivae and EOM are  normal.   Neck: Normal range of motion. Neck supple.   Cardiovascular: Normal rate, regular rhythm and normal heart sounds.    No murmur heard.  Pulmonary/Chest: Effort normal and breath sounds normal. No respiratory distress. He has no wheezes. He has no rales.   Abdominal: Soft. Bowel sounds are normal. He exhibits no distension. There is no tenderness. There is no guarding.   Musculoskeletal: Normal range of motion.   Neurological: He is alert and oriented to person, place, and time.   Skin: Skin is warm and dry.   Psychiatric: He has a normal mood and affect. His behavior is normal.   Vitals reviewed.        Data Reviewed:     LABS:   Lab Results   Component Value Date    GLUCOSE 95 04/29/2018    BUN 10 04/29/2018    CREATININE 0.93 04/29/2018    EGFRIFNONA 100 04/29/2018    BCR 10.8 04/29/2018    K 3.8 04/29/2018    CO2 25.0 04/29/2018    CALCIUM 8.8 04/29/2018    ALBUMIN 3.70 04/29/2018    AST 31 04/29/2018    ALT 43 04/29/2018     Lab Results   Component Value Date    WBC 5.27 04/29/2018    HGB 14.3 04/29/2018    HCT 40.9 04/29/2018    MCV 87.4 04/29/2018     04/29/2018       Lab Results   Component Value Date    TSH 2.250 04/06/2018     Lab Results   Component Value Date    HGBA1C 4.8 10/29/2015     Lab Results   Component Value Date    CREATININE 0.93 04/29/2018       Assessment/Plan     1. ADHD  -[facility] #[id number] reviewed   -He has signed controlled substance agreement.   -Will increase to Adderall 20mg BID  -Follow up in one month     2. Weight:  -Class: Overweight: 25.0-29.9kg/m2   -Patient's Body mass index is 25.4 kg/m². BMI is above normal parameters. Recommendations include: nutrition counseling.    3. Nicotine status:  reports that he has never smoked. He has never used smokeless tobacco.    4. Alcohol use:  reports that he does not drink alcohol.    5. Health Maintenance:   Health Maintenance   Topic Date Due   • INFLUENZA VACCINE  08/01/2018   • ANNUAL PHYSICAL  06/08/2019   • TDAP/TD  VACCINES (2 - Td) 01/01/2021       FOLLOW-UP  Return in about 4 weeks (around 9/11/2018) for Recheck.      ORDERS  Medications Discontinued During This Encounter   Medication Reason   • amphetamine-dextroamphetamine (ADDERALL) 10 MG tablet Reorder   • amphetamine-dextroamphetamine (ADDERALL) 10 MG tablet      Antione was seen today for adhd.    Diagnoses and all orders for this visit:    Attention deficit hyperactivity disorder (ADHD), combined type  -     Discontinue: amphetamine-dextroamphetamine (ADDERALL) 10 MG tablet; Take 2 tablets by mouth 2 (Two) Times a Day.  -     ToxASSURE Select 13 (MW) - Urine, Clean Catch; Future    Other orders  -     amphetamine-dextroamphetamine (ADDERALL) 20 MG tablet; Take 1 tablet by mouth 2 (Two) Times a Day.              This document has been electronically signed by Cesilia Kim MD on August 14, 2018 11:47 AM

## 2018-08-17 LAB — CONV REPORT SUMMARY: NORMAL

## 2018-08-17 NOTE — PROGRESS NOTES
I have seen this patient and discussed the case with resident and agree with the assessment and plan.  TERRI Gross M.D.

## 2018-09-21 ENCOUNTER — OFFICE VISIT (OUTPATIENT)
Dept: FAMILY MEDICINE CLINIC | Facility: CLINIC | Age: 23
End: 2018-09-21

## 2018-09-21 VITALS
HEART RATE: 76 BPM | DIASTOLIC BLOOD PRESSURE: 88 MMHG | WEIGHT: 171.2 LBS | OXYGEN SATURATION: 99 % | HEIGHT: 70 IN | BODY MASS INDEX: 24.51 KG/M2 | SYSTOLIC BLOOD PRESSURE: 138 MMHG

## 2018-09-21 DIAGNOSIS — F90.0 ATTENTION DEFICIT HYPERACTIVITY DISORDER (ADHD), PREDOMINANTLY INATTENTIVE TYPE: Primary | ICD-10-CM

## 2018-09-21 PROCEDURE — 99213 OFFICE O/P EST LOW 20 MIN: CPT | Performed by: FAMILY MEDICINE

## 2018-09-21 RX ORDER — DEXTROAMPHETAMINE SACCHARATE, AMPHETAMINE ASPARTATE, DEXTROAMPHETAMINE SULFATE AND AMPHETAMINE SULFATE 5; 5; 5; 5 MG/1; MG/1; MG/1; MG/1
20 TABLET ORAL 2 TIMES DAILY
Qty: 60 TABLET | Refills: 0 | Status: SHIPPED | OUTPATIENT
Start: 2018-09-21 | End: 2018-10-24 | Stop reason: ALTCHOICE

## 2018-09-21 NOTE — PROGRESS NOTES
"ID: Antione Villaseñor    CC:   Chief Complaint   Patient presents with   • ADHD     refill on adderall       Subjective:     HPI     Antione Villaseñor is a 23 y.o. male who presents for ADHD follow up.  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  He was most recently diagnosed with ADHD 3 months ago.  Patient states the current dose of adderal is working for him currently.  He has been out of medication for a week.  Patient takes 10 mg adderal 4 times a day.  He is adjusting when he takes it to see what works best for him.  Patient complains of difficulty concentrating.  Patient would like a refill.  Patient feels this medication helps him focus and work more efficiently.         Preventative:  Over the past 2 weeks, have you felt down, depressed, or hopeless?No   Over the past 2 weeks, have you felt little interest or pleasure in doing things?No  Clinical depression screening refused by patient.No     On osteoporosis therapy?Not Indicated     Past Medical Hx:  Past Medical History:   Diagnosis Date   • Ankle joint pain    • Bipolar disorder (CMS/[facility])    • Burning feet syndrome    • Depressive disorder    • Dysuria     Dysuria - OVER ACTIVE BLADDER VS POLYURIA      • Encounter for general adult medical examination without abnormal findings    • Heart murmur    • Infertile male syndrome    • Intervertebral disc disorders with radiculopathy, lumbar region    • Low back pain    • Male hypogonadism     Male hypogonadism - not receiving testosterone replacement by urology      • Memory loss    • Pain in unspecified foot    • Palpitations        Past Surgical Hx:  Past Surgical History:   Procedure Laterality Date   • INJECTION OF MEDICATION      Kenalog (1)      10/05/2015           Health Maintenance:  Health Maintenance   Topic Date Due   • INFLUENZA VACCINE  08/01/2018   • ANNUAL PHYSICAL  06/08/2019   • TDAP/TD VACCINES (2 - Td) 01/01/2021 " "      Current Meds:    Current Outpatient Prescriptions:   •  amphetamine-dextroamphetamine (ADDERALL) 20 MG tablet, Take 1 tablet by mouth 2 (Two) Times a Day., Disp: 60 tablet, Rfl: 0    Allergies:  Patient has no known allergies.    Family Hx:  Family History   Problem Relation Age of Onset   • Asthma Other    • Diabetes Other    • Heart disease Other    • Hypertension Other    • Lung disease Other    • Coronary artery disease Father    • Hypertension Father         Social History:  Social History     Social History   • Marital status:      Spouse name: N/A   • Number of children: N/A   • Years of education: N/A     Occupational History   • Not on file.     Social History Main Topics   • Smoking status: Never Smoker   • Smokeless tobacco: Never Used   • Alcohol use No   • Drug use: No   • Sexual activity: Yes     Partners: Female     Other Topics Concern   • Not on file     Social History Narrative   • No narrative on file       Review of Systems   Constitutional: Negative for activity change, appetite change, fatigue and fever.   HENT: Negative for ear pain and sore throat.    Eyes: Negative for pain and visual disturbance.   Respiratory: Negative for cough and shortness of breath.    Cardiovascular: Negative for chest pain and palpitations.   Gastrointestinal: Negative for abdominal pain and nausea.   Endocrine: Negative for cold intolerance and heat intolerance.   Genitourinary: Negative for difficulty urinating and dysuria.   Musculoskeletal: Negative for arthralgias and gait problem.   Skin: Negative for color change and rash.   Neurological: Negative for dizziness, weakness and headaches.   Hematological: Negative for adenopathy. Does not bruise/bleed easily.   Psychiatric/Behavioral: Positive for decreased concentration. Negative for agitation, confusion and sleep disturbance.       Objective:     /88   Pulse 76   Ht 177.8 cm (70\")   Wt 77.7 kg (171 lb 3.2 oz)   SpO2 99%   BMI 24.56 kg/m² "     Physical Exam   Constitutional: He is oriented to person, place, and time. He appears well-developed and well-nourished. No distress.   HENT:   Head: Normocephalic and atraumatic.   Nose: Nose normal.   Eyes: Conjunctivae are normal. Right eye exhibits no discharge. Left eye exhibits no discharge.   Neck: Neck supple.   Cardiovascular: Normal rate, regular rhythm and normal heart sounds.  Exam reveals no gallop and no friction rub.    No murmur heard.  Pulmonary/Chest: Effort normal and breath sounds normal. No accessory muscle usage. No respiratory distress. He has no wheezes. He has no rales. He exhibits no tenderness.   Abdominal: Soft. Bowel sounds are normal. He exhibits no distension. There is no tenderness.   Musculoskeletal: He exhibits no edema or deformity.   Neurological: He is alert and oriented to person, place, and time.   Skin: Skin is warm and dry. No rash noted. He is not diaphoretic. No erythema. No pallor.   Psychiatric: He has a normal mood and affect. His behavior is normal.        Assessment/Plan:     Antione was seen today for adhd.    Diagnoses and all orders for this visit:    Attention deficit hyperactivity disorder (ADHD), predominantly inattentive type  -     amphetamine-dextroamphetamine (ADDERALL) 20 MG tablet; Take 1 tablet by mouth 2 (Two) Times a Day.      Jarret reviewed and appropriate # [id number]  Patient to call next month for refill and to come back in 2 months.  As his urine drug screen was appropriate last month it will not be obtained today.    Follow-up:     Return in about 2 months (around 11/21/2018).      Goals        Patient Stated    • Patient reports that he would like to have improvment of his memory.  (pt-stated)            Barriers to goal: None          Patient's Body mass index is 24.56 kg/m². BMI is within normal parameters. No follow-up required.      Health Maintenance   Topic Date Due   • INFLUENZA VACCINE  08/01/2018   • ANNUAL PHYSICAL  06/08/2019   •  TDAP/TD VACCINES (2 - Td) 01/01/2021       does not smoke or drink alcohol  eat more fruits and vegetables, increase water intake, increase physical activity, cut out extra servings, family to eat at dinner table more often, keep TV off during meals and plan meals    RISK SCORE: 3          This document has been electronically signed by Jagruti Taylor MD on September 27, 2018 6:36 PM

## 2018-10-24 ENCOUNTER — OFFICE VISIT (OUTPATIENT)
Dept: FAMILY MEDICINE CLINIC | Facility: CLINIC | Age: 23
End: 2018-10-24

## 2018-10-24 VITALS
WEIGHT: 173.19 LBS | HEART RATE: 108 BPM | DIASTOLIC BLOOD PRESSURE: 64 MMHG | BODY MASS INDEX: 24.79 KG/M2 | HEIGHT: 70 IN | OXYGEN SATURATION: 98 % | SYSTOLIC BLOOD PRESSURE: 122 MMHG

## 2018-10-24 DIAGNOSIS — F90.2 ATTENTION DEFICIT HYPERACTIVITY DISORDER (ADHD), COMBINED TYPE: Primary | ICD-10-CM

## 2018-10-24 PROCEDURE — 99213 OFFICE O/P EST LOW 20 MIN: CPT | Performed by: STUDENT IN AN ORGANIZED HEALTH CARE EDUCATION/TRAINING PROGRAM

## 2018-10-24 NOTE — PROGRESS NOTES
Subjective:     HPI     Antione Villaseñor is a 23 y.o. male who presents for ADHD follow up.    Patient is currently working at [employer]. He was diagnosed with ADHD at the age of 3, with most recent diagnosis 3 months ago. He recently acquired new insurance which he was told would cover Vyvanse and he would like to try switching to that. He took vyvanse previously and had to be switched to adderall due to insurance coverage. Adderall is working however he would like to try Vyvanse again as he feels the adderall is a little too stimulating on initially taking it as well as he is taking it 4 times daily. He takes 10 mg four times a day and with that scheduling ends up missing one or two doses occasionally. He felt more even on Vyvanse and with his new insurance would like to try that again. He denies issues with sleep or appetite on adderall.      Preventative:  Over the past 2 weeks, have you felt down, depressed, or hopeless?No   Over the past 2 weeks, have you felt little interest or pleasure in doing things?No  Clinical depression screening refused by patient.No     On osteoporosis therapy?Not Indicated     Past Medical Hx:  Past Medical History:   Diagnosis Date   • Ankle joint pain    • Bipolar disorder (CMS/[facility])    • Burning feet syndrome    • Depressive disorder    • Dysuria     Dysuria - OVER ACTIVE BLADDER VS POLYURIA      • Encounter for general adult medical examination without abnormal findings    • Heart murmur    • Infertile male syndrome    • Intervertebral disc disorders with radiculopathy, lumbar region    • Low back pain    • Male hypogonadism     Male hypogonadism - not receiving testosterone replacement by urology      • Memory loss    • Pain in unspecified foot    • Palpitations        Past Surgical Hx:  Past Surgical History:   Procedure Laterality Date   • INJECTION OF MEDICATION      Kenalog (1)      10/05/2015           Health Maintenance:  Health Maintenance   Topic Date Due   •  INFLUENZA VACCINE  08/01/2018   • ANNUAL PHYSICAL  06/08/2019   • TDAP/TD VACCINES (2 - Td) 01/01/2021       Current Meds:    Current Outpatient Prescriptions:   •  lisdexamfetamine dimesylate (VYVANSE) 10 MG capsule, Take 1 capsule by mouth Daily, Disp: 30 capsule, Rfl: 0    Allergies:  Patient has no known allergies.    Family Hx:  Family History   Problem Relation Age of Onset   • Asthma Other    • Diabetes Other    • Heart disease Other    • Hypertension Other    • Lung disease Other    • Coronary artery disease Father    • Hypertension Father         Social History:  Social History     Social History   • Marital status:      Spouse name: N/A   • Number of children: N/A   • Years of education: N/A     Occupational History   • Not on file.     Social History Main Topics   • Smoking status: Never Smoker   • Smokeless tobacco: Never Used   • Alcohol use No   • Drug use: No   • Sexual activity: Yes     Partners: Female     Other Topics Concern   • Not on file     Social History Narrative   • No narrative on file       Review of Systems   Constitutional: Negative for activity change, appetite change, fatigue and fever.   HENT: Negative for ear pain and sore throat.    Eyes: Negative for pain and visual disturbance.   Respiratory: Negative for cough and shortness of breath.    Cardiovascular: Negative for chest pain and palpitations.   Gastrointestinal: Negative for abdominal pain and nausea.   Endocrine: Negative for cold intolerance and heat intolerance.   Genitourinary: Negative for difficulty urinating and dysuria.   Musculoskeletal: Negative for arthralgias and gait problem.   Skin: Negative for color change and rash.   Neurological: Negative for dizziness, weakness and headaches.   Hematological: Negative for adenopathy. Does not bruise/bleed easily.   Psychiatric/Behavioral: Positive for decreased concentration. Negative for agitation, confusion and sleep disturbance.       Objective:     /64 (BP  "Location: Left arm, Patient Position: Sitting, Cuff Size: Adult)   Pulse 108   Ht 177.8 cm (70\")   Wt 78.6 kg (173 lb 3 oz)   SpO2 98%   BMI 24.85 kg/m²     Physical Exam   Constitutional: He is oriented to person, place, and time. He appears well-developed and well-nourished. No distress.   HENT:   Head: Normocephalic and atraumatic.   Nose: Nose normal.   Eyes: Conjunctivae are normal. Right eye exhibits no discharge. Left eye exhibits no discharge.   Neck: Neck supple.   Cardiovascular: Normal rate, regular rhythm and normal heart sounds.  Exam reveals no gallop and no friction rub.    No murmur heard.  Pulmonary/Chest: Effort normal and breath sounds normal. No accessory muscle usage. No respiratory distress. He has no wheezes. He has no rales. He exhibits no tenderness.   Abdominal: Soft. Bowel sounds are normal. He exhibits no distension. There is no tenderness.   Musculoskeletal: He exhibits no edema or deformity.   Neurological: He is alert and oriented to person, place, and time.   Skin: Skin is warm and dry. No rash noted. He is not diaphoretic. No erythema. No pallor.   Psychiatric: He has a normal mood and affect. His behavior is normal.        Assessment/Plan:     Antione was seen today for adhd.    Diagnoses and all orders for this visit:    Attention deficit hyperactivity disorder (ADHD), combined type  -     lisdexamfetamine dimesylate (VYVANSE) 10 MG capsule; Take 1 capsule by mouth Daily       Jarret reviewed and appropriate # [id number], reviewed and appropriate.    UDS up to date and appropriate.    Follow-up:     Return in about 4 weeks (around 11/21/2018).      Goals        Patient Stated    • Patient reports that he would like to have improvment of his memory.  (pt-stated)            Barriers to goal: None          Patient's Body mass index is 24.85 kg/m². BMI is within normal parameters. No follow-up required.      Health Maintenance   Topic Date Due   • INFLUENZA VACCINE  08/01/2018   • " ANNUAL PHYSICAL  06/08/2019   • TDAP/TD VACCINES (2 - Td) 01/01/2021       does not smoke or drink alcohol  eat more fruits and vegetables, increase water intake, increase physical activity, cut out extra servings, family to eat at dinner table more often, keep TV off during meals and plan meals    RISK SCORE: 3        Haydee Mcneil MD PGY2   [facility] Family Medicine Residency  This document has been electronically signed by Haydee Mcneil MD on October 31, 2018 6:07 AM

## 2018-10-26 ENCOUNTER — TELEPHONE (OUTPATIENT)
Dept: FAMILY MEDICINE CLINIC | Facility: CLINIC | Age: 23
End: 2018-10-26

## 2018-10-26 NOTE — TELEPHONE ENCOUNTER
Patient called and said that he hasn't taken his medication in 7 days and wanted to know if there was anyway that we could fax a PA back over to his pharmacy ([facility]) tonight.     I let him know it would most likely be Monday.

## 2018-10-29 ENCOUNTER — TELEPHONE (OUTPATIENT)
Dept: FAMILY MEDICINE CLINIC | Facility: CLINIC | Age: 23
End: 2018-10-29

## 2018-10-29 NOTE — TELEPHONE ENCOUNTER
Pt will need a PA for that medication, venecia is out of town so I was told that you are covering him until then.

## 2018-10-29 NOTE — TELEPHONE ENCOUNTER
Patient called about his medication said he has a direct line that can bypass the PA per sven.     Line [phone]

## 2018-10-29 NOTE — TELEPHONE ENCOUNTER
Pt said [facility] sent a denial for his lisdexamfetamine dimesylate (VYVANSE) 10 MG capsule last Friday. He has been out of this medicine for 3 days.    Thanks

## 2018-10-31 ENCOUNTER — TELEPHONE (OUTPATIENT)
Dept: FAMILY MEDICINE CLINIC | Facility: CLINIC | Age: 23
End: 2018-10-31

## 2018-10-31 NOTE — TELEPHONE ENCOUNTER
The Vyvanse has been Rejected by the Insurance  These medications are more likely to be covered by his Insurance plan  Amphetamine-dextroamphet ER  Dexmethylphenidate HCI ER

## 2018-10-31 NOTE — TELEPHONE ENCOUNTER
Patient called and wanted to know if the PA had been filled out for the vyvanse yet and if not then he just wants his old medication because he is tired of waiting and is 9 days overdue. The pharmacy was [facility].

## 2018-11-01 ENCOUNTER — TELEPHONE (OUTPATIENT)
Dept: FAMILY MEDICINE CLINIC | Facility: CLINIC | Age: 23
End: 2018-11-01

## 2018-11-01 NOTE — TELEPHONE ENCOUNTER
----- Message from Gretta Vallejo MA sent at 11/1/2018  9:10 AM CDT -----      ----- Message -----  From: Jagruti Taylor MD  Sent: 10/31/2018   5:00 PM  To: Gretta Vallejo MA    Call this patient and tell him his vyvanse can either be paid out of pocket or he can try addcristinaall.  His choice.          Patient has been informed and is wanting addcristinaall and states he's going to make an appointment to talk to someone

## 2018-11-02 RX ORDER — DEXTROAMPHETAMINE SACCHARATE, AMPHETAMINE ASPARTATE, DEXTROAMPHETAMINE SULFATE AND AMPHETAMINE SULFATE 5; 5; 5; 5 MG/1; MG/1; MG/1; MG/1
20 TABLET ORAL 2 TIMES DAILY
Qty: 60 TABLET | Refills: 0 | Status: SHIPPED | OUTPATIENT
Start: 2018-11-02 | End: 2018-12-06 | Stop reason: SDUPTHER

## 2018-11-02 RX ORDER — DEXTROAMPHETAMINE SACCHARATE, AMPHETAMINE ASPARTATE MONOHYDRATE, DEXTROAMPHETAMINE SULFATE AND AMPHETAMINE SULFATE 5; 5; 5; 5 MG/1; MG/1; MG/1; MG/1
20 CAPSULE, EXTENDED RELEASE ORAL EVERY MORNING
Qty: 30 CAPSULE | Refills: 0 | Status: SHIPPED | OUTPATIENT
Start: 2018-11-02 | End: 2018-11-02

## 2018-11-02 RX ORDER — DEXTROAMPHETAMINE SACCHARATE, AMPHETAMINE ASPARTATE MONOHYDRATE, DEXTROAMPHETAMINE SULFATE AND AMPHETAMINE SULFATE 5; 5; 5; 5 MG/1; MG/1; MG/1; MG/1
20 CAPSULE, EXTENDED RELEASE ORAL EVERY MORNING
Qty: 30 CAPSULE | Refills: 0 | OUTPATIENT
Start: 2018-11-02

## 2018-12-04 ENCOUNTER — TELEPHONE (OUTPATIENT)
Dept: FAMILY MEDICINE CLINIC | Facility: CLINIC | Age: 23
End: 2018-12-04

## 2018-12-06 RX ORDER — DEXTROAMPHETAMINE SACCHARATE, AMPHETAMINE ASPARTATE, DEXTROAMPHETAMINE SULFATE AND AMPHETAMINE SULFATE 5; 5; 5; 5 MG/1; MG/1; MG/1; MG/1
20 TABLET ORAL 2 TIMES DAILY
Qty: 60 TABLET | Refills: 0 | Status: SHIPPED | OUTPATIENT
Start: 2018-12-06 | End: 2019-01-17 | Stop reason: SDUPTHER

## 2019-01-07 ENCOUNTER — TELEPHONE (OUTPATIENT)
Dept: FAMILY MEDICINE CLINIC | Facility: CLINIC | Age: 24
End: 2019-01-07

## 2019-01-07 NOTE — TELEPHONE ENCOUNTER
Dr Hall    Patient came in this morning wanting to  a prescription for his Adderall. He is out.  He said he had been in since his last visit and had been given his prescription each time. I told him he would need an appointment but he disagreed. I also told him he could not be seen today as a walk in for that. He would like for you to call him and also wants to speak with you about his back pain and an epidural.    Lianna

## 2019-01-08 DIAGNOSIS — G89.29 CHRONIC BILATERAL LOW BACK PAIN WITH BILATERAL SCIATICA: Primary | ICD-10-CM

## 2019-01-08 DIAGNOSIS — M54.41 CHRONIC BILATERAL LOW BACK PAIN WITH BILATERAL SCIATICA: Primary | ICD-10-CM

## 2019-01-08 DIAGNOSIS — M54.42 CHRONIC BILATERAL LOW BACK PAIN WITH BILATERAL SCIATICA: Primary | ICD-10-CM

## 2019-01-17 ENCOUNTER — OFFICE VISIT (OUTPATIENT)
Dept: FAMILY MEDICINE CLINIC | Facility: CLINIC | Age: 24
End: 2019-01-17

## 2019-01-17 ENCOUNTER — APPOINTMENT (OUTPATIENT)
Dept: LAB | Facility: HOSPITAL | Age: 24
End: 2019-01-17

## 2019-01-17 VITALS
HEIGHT: 70 IN | DIASTOLIC BLOOD PRESSURE: 78 MMHG | BODY MASS INDEX: 24.29 KG/M2 | HEART RATE: 111 BPM | OXYGEN SATURATION: 99 % | WEIGHT: 169.7 LBS | SYSTOLIC BLOOD PRESSURE: 128 MMHG

## 2019-01-17 DIAGNOSIS — F90.9 ATTENTION DEFICIT HYPERACTIVITY DISORDER (ADHD), UNSPECIFIED ADHD TYPE: Primary | ICD-10-CM

## 2019-01-17 PROCEDURE — G0483 DRUG TEST DEF 22+ CLASSES: HCPCS | Performed by: STUDENT IN AN ORGANIZED HEALTH CARE EDUCATION/TRAINING PROGRAM

## 2019-01-17 PROCEDURE — 80307 DRUG TEST PRSMV CHEM ANLYZR: CPT | Performed by: STUDENT IN AN ORGANIZED HEALTH CARE EDUCATION/TRAINING PROGRAM

## 2019-01-17 PROCEDURE — 99213 OFFICE O/P EST LOW 20 MIN: CPT | Performed by: STUDENT IN AN ORGANIZED HEALTH CARE EDUCATION/TRAINING PROGRAM

## 2019-01-17 RX ORDER — DEXTROAMPHETAMINE SACCHARATE, AMPHETAMINE ASPARTATE, DEXTROAMPHETAMINE SULFATE AND AMPHETAMINE SULFATE 5; 5; 5; 5 MG/1; MG/1; MG/1; MG/1
20 TABLET ORAL 2 TIMES DAILY
Qty: 60 TABLET | Refills: 0 | Status: SHIPPED | OUTPATIENT
Start: 2019-01-17 | End: 2019-02-26 | Stop reason: SDUPTHER

## 2019-01-17 NOTE — PROGRESS NOTES
"Subjective       Antione Villaseñor is a 23 y.o. male.   Who presents to the clinic for ADHD medication refill. Pt reports that he is stable on current dose. Pt is followed by Dr Isabel Handy: sleep issues, chest pressure, chest pain, fever, chills, n/v, diarrhea.     Chief Complaint   Patient presents with   • ADHD     med refill       History of Present Illness     The following portions of the patient's history were reviewed and updated as appropriate: allergies, current medications, past family history, past medical history, past social history, past surgical history and problem list.    Current Outpatient Medications   Medication Sig Dispense Refill   • amphetamine-dextroamphetamine (ADDERALL) 20 MG tablet Take 1 tablet by mouth 2 (Two) Times a Day. 60 tablet 0     No current facility-administered medications for this visit.        No Known Allergies    Past Medical History:   Diagnosis Date   • ADHD (attention deficit hyperactivity disorder)    • Ankle joint pain    • Bipolar disorder (CMS/HCC)    • Burning feet syndrome    • Depressive disorder    • Dysuria     Dysuria - OVER ACTIVE BLADDER VS POLYURIA      • Encounter for general adult medical examination without abnormal findings    • Heart murmur    • Infertile male syndrome    • Intervertebral disc disorders with radiculopathy, lumbar region    • Low back pain    • Male hypogonadism     Male hypogonadism - not receiving testosterone replacement by urology      • Memory loss    • Pain in unspecified foot    • Palpitations        Adverse side effects noted: none  The parent(s) report that performance and behavior are stable  Patient reports: stable    Review of Systems    /78   Pulse 111   Ht 177.8 cm (70\")   Wt 77 kg (169 lb 11.2 oz)   SpO2 99%   BMI 24.35 kg/m²   Review of Systems   Constitutional: Negative for activity change, appetite change, chills and fever.   HENT: Negative for congestion, ear pain and sore throat.    Eyes: Negative " "for redness and visual disturbance.   Respiratory: Negative for cough, shortness of breath and wheezing.    Cardiovascular: Negative for chest pain and palpitations.   Gastrointestinal: Negative for abdominal pain, diarrhea, nausea and vomiting.   Genitourinary: Negative for difficulty urinating and dysuria.   Musculoskeletal: Negative for arthralgias and gait problem.   Skin: Negative for color change and rash.   Neurological: Negative for dizziness, weakness and headaches.   Psychiatric/Behavioral: Negative for dysphoric mood and sleep disturbance. The patient is not nervous/anxious.        Objective     Physical Exam  /78   Pulse 111   Ht 177.8 cm (70\")   Wt 77 kg (169 lb 11.2 oz)   SpO2 99%   BMI 24.35 kg/m²     General Appearance:    Alert, cooperative, no distress, appears stated age   Head:    Normocephalic, without obvious abnormality, atraumatic   Eyes:    PERRL, conjunctiva/corneas clear, EOM's intact     Ears:    Normal TM's and external ear canals, both ears   Nose:   Nares normal, septum midline, mucosa normal, no drainage    or sinus tenderness   Throat:   Lips, mucosa, and tongue normal; teeth and gums normal   Neck:   Supple, symmetrical, trachea midline, no adenopathy;        thyroid:  No enlargement/tenderness/nodules   Back:     Symmetric, no curvature, ROM normal, no CVA tenderness   Lungs:     Clear to auscultation bilaterally, respirations unlabored   Chest wall:    No tenderness or deformity   Heart:    Regular rate and rhythm, S1 and S2 normal, no murmur, rub    or gallop   Abdomen:     Soft, non-tender, bowel sounds active all four quadrants,     no masses, no organomegaly           Extremities:   Extremities normal, atraumatic, no cyanosis or edema   Pulses:   2+ and symmetric all extremities   Skin:   Skin color, texture, turgor normal, no rashes or lesions   Lymph nodes:   Cervical, supraclavicular, and axillary nodes normal   Neurologic:   CNII-XII intact. Normal strength, " sensation and reflexes       throughout         Assessment/Plan   Antione was seen today for adhd.    Diagnoses and all orders for this visit:    Attention deficit hyperactivity disorder (ADHD), unspecified ADHD type  -     Compliance Drug Analysis, Ur - Urine, Clean Catch    Other orders  -     amphetamine-dextroamphetamine (ADDERALL) 20 MG tablet; Take 1 tablet by mouth 2 (Two) Times a Day.        Antione was seen today for adhd.    Diagnoses and all orders for this visit:    Attention deficit hyperactivity disorder (ADHD), unspecified ADHD type  -     Compliance Drug Analysis, Ur - Urine, Clean Catch    Other orders  -     amphetamine-dextroamphetamine (ADDERALL) 20 MG tablet; Take 1 tablet by mouth 2 (Two) Times a Day.          Return in about 4 weeks (around 2/14/2019).           Continue ADHD meds on scheduled basis. Monitor for side effects such as change in appetite, sleep, behavior or any type of cardiovascular issue. Call or return for any side effect issues.  Continue to call monthly for medication refills. Follow up in 1 mo and sooner for problems.  Parents to discuss pt's school performance with teacher prior to visit.    Caden Brown MD  Family Medicine Resident PGY1  [facility]        This document has been electronically signed by Caden Brown MD on January 17, 2019 5:12 PM  @

## 2019-01-18 DIAGNOSIS — M54.42 ACUTE BACK PAIN WITH SCIATICA, LEFT: Primary | ICD-10-CM

## 2019-01-18 DIAGNOSIS — G89.29 CHRONIC IDIOPATHIC ANAL PAIN: ICD-10-CM

## 2019-01-18 DIAGNOSIS — K62.89 CHRONIC IDIOPATHIC ANAL PAIN: ICD-10-CM

## 2019-01-18 DIAGNOSIS — M54.41 ACUTE BACK PAIN WITH SCIATICA, RIGHT: ICD-10-CM

## 2019-01-18 NOTE — PROGRESS NOTES
I have reviewed the notes, assessments, and/or procedures performed by the resident, I concur with her/his documentation of Antione Villaseñor.       This document has been electronically signed by Kenji Fleming MD on January 18, 2019 3:18 PM

## 2019-01-26 LAB — CONV REPORT SUMMARY: NORMAL

## 2019-01-28 ENCOUNTER — OFFICE VISIT (OUTPATIENT)
Dept: FAMILY MEDICINE CLINIC | Facility: CLINIC | Age: 24
End: 2019-01-28

## 2019-01-28 ENCOUNTER — LAB (OUTPATIENT)
Dept: LAB | Facility: HOSPITAL | Age: 24
End: 2019-01-28

## 2019-01-28 VITALS
SYSTOLIC BLOOD PRESSURE: 122 MMHG | HEART RATE: 71 BPM | DIASTOLIC BLOOD PRESSURE: 68 MMHG | BODY MASS INDEX: 23.84 KG/M2 | HEIGHT: 70 IN | WEIGHT: 166.56 LBS | OXYGEN SATURATION: 98 %

## 2019-01-28 DIAGNOSIS — R10.32 LEFT LOWER QUADRANT PAIN: Primary | ICD-10-CM

## 2019-01-28 DIAGNOSIS — N41.9 PROSTATITIS, UNSPECIFIED PROSTATITIS TYPE: ICD-10-CM

## 2019-01-28 DIAGNOSIS — M54.32 SCIATICA OF LEFT SIDE: ICD-10-CM

## 2019-01-28 LAB
BILIRUB BLD-MCNC: NEGATIVE MG/DL
CLARITY, POC: CLEAR
COLOR UR: ABNORMAL
GLUCOSE UR STRIP-MCNC: NEGATIVE MG/DL
KETONES UR QL: NEGATIVE
LEUKOCYTE EST, POC: NEGATIVE
NITRITE UR-MCNC: NEGATIVE MG/ML
PH UR: 8.5 [PH] (ref 5–8)
PROT UR STRIP-MCNC: ABNORMAL MG/DL
RBC # UR STRIP: NEGATIVE /UL
SP GR UR: 1 (ref 1–1.03)
UROBILINOGEN UR QL: NORMAL

## 2019-01-28 PROCEDURE — 86702 HIV-2 ANTIBODY: CPT

## 2019-01-28 PROCEDURE — 90471 IMMUNIZATION ADMIN: CPT | Performed by: STUDENT IN AN ORGANIZED HEALTH CARE EDUCATION/TRAINING PROGRAM

## 2019-01-28 PROCEDURE — 86592 SYPHILIS TEST NON-TREP QUAL: CPT

## 2019-01-28 PROCEDURE — 81002 URINALYSIS NONAUTO W/O SCOPE: CPT | Performed by: STUDENT IN AN ORGANIZED HEALTH CARE EDUCATION/TRAINING PROGRAM

## 2019-01-28 PROCEDURE — 99213 OFFICE O/P EST LOW 20 MIN: CPT | Performed by: STUDENT IN AN ORGANIZED HEALTH CARE EDUCATION/TRAINING PROGRAM

## 2019-01-28 PROCEDURE — 86696 HERPES SIMPLEX TYPE 2 TEST: CPT

## 2019-01-28 PROCEDURE — 36415 COLL VENOUS BLD VENIPUNCTURE: CPT

## 2019-01-28 PROCEDURE — 90674 CCIIV4 VAC NO PRSV 0.5 ML IM: CPT | Performed by: STUDENT IN AN ORGANIZED HEALTH CARE EDUCATION/TRAINING PROGRAM

## 2019-01-28 PROCEDURE — 87086 URINE CULTURE/COLONY COUNT: CPT | Performed by: STUDENT IN AN ORGANIZED HEALTH CARE EDUCATION/TRAINING PROGRAM

## 2019-01-28 PROCEDURE — 86695 HERPES SIMPLEX TYPE 1 TEST: CPT

## 2019-01-28 PROCEDURE — 87522 HEPATITIS C REVRS TRNSCRPJ: CPT

## 2019-01-28 RX ORDER — DOXYCYCLINE 100 MG/1
100 CAPSULE ORAL 2 TIMES DAILY
Qty: 28 CAPSULE | Refills: 0 | Status: SHIPPED | OUTPATIENT
Start: 2019-01-28 | End: 2019-03-25 | Stop reason: SDDI

## 2019-01-28 RX ORDER — DICYCLOMINE HYDROCHLORIDE 10 MG/1
10 CAPSULE ORAL 4 TIMES DAILY PRN
Qty: 20 CAPSULE | Refills: 0 | Status: SHIPPED | OUTPATIENT
Start: 2019-01-28 | End: 2019-03-25

## 2019-01-28 NOTE — PROGRESS NOTES
"ID: Antione Villaseñor    CC:   Chief Complaint   Patient presents with   • Abdominal Pain     Pt was treated for impaction. Had normal bm but is still hurting bad.       Subjective:     HPI     Antione Villaseñor is a 23 y.o. male who presents for follow up of abd pain.  He had an episode that began about 2 weeks ago.  He had sudden onset of left lower quadrant pain.  He was very sweaty, and had episode of syncope.  He went to the urgent care which had x-ray and urinalysis.  Urinalysis was negative and x-rays showed an unremarkable exam.  He was treated for constipation which had since resolved.  However pain is persistent.  His left lower quadrant radiating down to the testicle.  He is also complaining of left flank pain.  He also complains of pain in his lower pelvis, patient states around where the prostate should be.  No dysuria, hematuria, discharge.  No fevers or chills, no nausea or vomiting since initial episode.  He is having urgency for the last week.    He also has history of sciatica for about the last 6 years.  He has seen Dr. Vasquez, orthopedics.  Pain was on and off for the first 4 years, but has been stable for the last 2.  It is moderate in intensity, has numbness, describes it as a \"toothache in his legs.\"    Preventative:  Over the past 2 weeks, have you felt down, depressed, or hopeless? no  Over the past 2 weeks, have you felt little interest or pleasure in doing things? no  Clinical depression screening refused by patient no    On osteoporosis therapy? no    Past Medical Hx:  Past Medical History:   Diagnosis Date   • ADHD (attention deficit hyperactivity disorder)    • Ankle joint pain    • Bipolar disorder (CMS/[facility])    • Burning feet syndrome    • Depressive disorder    • Dysuria     Dysuria - OVER ACTIVE BLADDER VS POLYURIA      • Encounter for general adult medical examination without abnormal findings    • Heart murmur    • Infertile male syndrome    • Intervertebral disc disorders " with radiculopathy, lumbar region    • Low back pain    • Male hypogonadism     Male hypogonadism - not receiving testosterone replacement by urology      • Memory loss    • Pain in unspecified foot    • Palpitations        Past Surgical Hx:  Past Surgical History:   Procedure Laterality Date   • INJECTION OF MEDICATION      Kenalog (1)      10/05/2015           Health Maintenance:  Health Maintenance   Topic Date Due   • INFLUENZA VACCINE  08/01/2018   • ANNUAL PHYSICAL  06/08/2019   • TDAP/TD VACCINES (2 - Td) 01/01/2021       Current Meds:    Current Outpatient Medications:   •  amphetamine-dextroamphetamine (ADDERALL) 20 MG tablet, Take 1 tablet by mouth 2 (Two) Times a Day., Disp: 60 tablet, Rfl: 0  •  dicyclomine (BENTYL) 10 MG capsule, Take 1 capsule by mouth 4 (Four) Times a Day As Needed (abd pain/cramping)., Disp: 20 capsule, Rfl: 0  •  doxycycline (MONODOX) 100 MG capsule, Take 1 capsule by mouth 2 (Two) Times a Day., Disp: 28 capsule, Rfl: 0    Allergies:  Patient has no known allergies.    Family Hx:  Family History   Problem Relation Age of Onset   • Asthma Other    • Diabetes Other    • Heart disease Other    • Hypertension Other    • Lung disease Other    • Coronary artery disease Father    • Hypertension Father         Social History:  Social History     Socioeconomic History   • Marital status:      Spouse name: Not on file   • Number of children: Not on file   • Years of education: Not on file   • Highest education level: Not on file   Social Needs   • Financial resource strain: Not on file   • Food insecurity - worry: Not on file   • Food insecurity - inability: Not on file   • Transportation needs - medical: Not on file   • Transportation needs - non-medical: Not on file   Occupational History   • Not on file   Tobacco Use   • Smoking status: Never Smoker   • Smokeless tobacco: Never Used   Substance and Sexual Activity   • Alcohol use: No   • Drug use: No   • Sexual activity: Yes      "Partners: Female   Other Topics Concern   • Not on file   Social History Narrative   • Not on file       Review of Systems   Constitutional: Negative for activity change, appetite change, chills and fever.   HENT: Negative for congestion, ear pain and sore throat.    Eyes: Negative for redness and visual disturbance.   Respiratory: Negative for cough, shortness of breath and wheezing.    Cardiovascular: Negative for chest pain and palpitations.   Gastrointestinal: Positive for abdominal pain. Negative for diarrhea, nausea and vomiting.   Genitourinary: Positive for flank pain, testicular pain and urgency. Negative for difficulty urinating, discharge, dysuria and hematuria.   Musculoskeletal: Positive for arthralgias and gait problem.   Skin: Negative for color change and rash.   Neurological: Negative for dizziness, weakness and headaches.   Psychiatric/Behavioral: Negative for dysphoric mood and sleep disturbance. The patient is not nervous/anxious.        Objective:     /68   Pulse 71   Ht 177.8 cm (70\")   Wt 75.6 kg (166 lb 9 oz)   SpO2 98%   BMI 23.90 kg/m²     Physical Exam  Constitutional: oriented to person, place, and time. well-developed and well-nourished.   HENT:   Head: Normocephalic and atraumatic.   Right Ear: External ear normal.   Left Ear: External ear normal.   Nose: Nose normal.   Eyes: Conjunctivae and EOM are normal. Pupils are equal, round, and reactive to light.   Neck: Normal range of motion. Neck supple.   Cardiovascular: Normal rate, regular rhythm and normal heart sounds.    Pulmonary/Chest: Effort normal and breath sounds normal. no wheezes.   Abdominal: Soft. Bowel sounds are normal. exhibits no distension. There is no tenderness.   Musculoskeletal: Normal range of motion.  exhibits no edema or deformity. Tenderness midline at L4 level, +SLR left  Neurological: alert and oriented to person, place, and time. No cranial nerve deficit.   Skin: Skin is warm.   Psychiatric: has a " normal mood and affect. behavior is normal. Thought content normal.   Nursing note and vitals reviewed.   : Genitalia normal, both testicles nontender, left cord structure mildly tender to palpation, no hernia.  No external hemorrhoids, prostate not enlarged but tender on exam. FOBT-     Assessment/Plan:     Antione was seen today for abdominal pain.    Diagnoses and all orders for this visit:    Left lower quadrant pain  -     POCT urinalysis dipstick, manual    Prostatitis, unspecified prostatitis type  -     doxycycline (MONODOX) 100 MG capsule; Take 1 capsule by mouth 2 (Two) Times a Day.  -     Urine Culture - Urine, Urine, Clean Catch  -     Hepatitis C RNA, quantitative, PCR (graph); Future  -     HIV 2 Antibody Screen w reflex; Future  -     HSV 1 and 2 IgM, Abs, Indirect; Future  -     HSV 1 and 2-Specific Ab, IgG; Future  -     RPR; Future  -     Trichomonas vaginalis, PCR - Swab, Urine, Clean Catch  -     Chlamydia trachomatis, Neisseria gonorrhoeae, PCR - Swab, Urine, Clean Catch; Future    Sciatica of left side    Other orders  -     dicyclomine (BENTYL) 10 MG capsule; Take 1 capsule by mouth 4 (Four) Times a Day As Needed (abd pain/cramping).  -     Flucelvax Quad=>4Years (PFS)      Will treat with doxycycline for prostatitis, subacute, patient also on further questioning.  One to get STD screenings.  We'll also give Bentyl to take for any further abdominal pains to identify if this is having a GI component.    Follow-up:     Return in about 2 weeks (around 2/11/2019).      Goals:   Goals        Patient Stated    • Patient reports that he would like to have improvment of his memory.  (pt-stated)      Barriers to goal: None            Health Maintenance   Topic Date Due   • INFLUENZA VACCINE  08/01/2018   • ANNUAL PHYSICAL  06/08/2019   • TDAP/TD VACCINES (2 - Td) 01/01/2021       Tobacco: nonsmoker  Alcohol: does not drink  Lifestyle: Patient's Body mass index is 23.9 kg/m². BMI is within normal  parameters. No follow-up required..   eat more fruits and vegetables, decrease soda or juice intake and increase water intake    RISK SCORE: 3          This document has been electronically signed by Dre Crawford MD on January 28, 2019 1:47 PM

## 2019-01-29 LAB
HSV1 IGG SER IA-ACNC: <0.91 INDEX (ref 0–0.9)
HSV2 IGG SER IA-ACNC: <0.91 INDEX (ref 0–0.9)

## 2019-01-29 NOTE — PROGRESS NOTES
I have reviewed the notes, assessments, and/or procedures performed by Dr. Crawford, I concur with her/his documentation of Antione Villaseñor.

## 2019-01-30 LAB
BACTERIA SPEC AEROBE CULT: NORMAL
HSV1 IGM TITR SER IF: NORMAL TITER
HSV2 IGM TITR SER IF: NORMAL TITER
RPR SER QL: NORMAL

## 2019-01-31 LAB — HIV 2 AB SERPL QL IA: NEGATIVE

## 2019-02-07 ENCOUNTER — HOSPITAL ENCOUNTER (OUTPATIENT)
Dept: MRI IMAGING | Facility: HOSPITAL | Age: 24
Discharge: HOME | End: 2019-02-07
Attending: FAMILY MEDICINE | Admitting: FAMILY MEDICINE

## 2019-02-07 DIAGNOSIS — G89.29 CHRONIC IDIOPATHIC ANAL PAIN: ICD-10-CM

## 2019-02-07 DIAGNOSIS — M54.41 ACUTE BACK PAIN WITH SCIATICA, RIGHT: ICD-10-CM

## 2019-02-07 DIAGNOSIS — K62.89 CHRONIC IDIOPATHIC ANAL PAIN: ICD-10-CM

## 2019-02-07 DIAGNOSIS — M54.42 ACUTE BACK PAIN WITH SCIATICA, LEFT: ICD-10-CM

## 2019-02-07 PROCEDURE — 25010000002 GADOTERIDOL PER 1 ML: Performed by: FAMILY MEDICINE

## 2019-02-07 PROCEDURE — A9576 INJ PROHANCE MULTIPACK: HCPCS | Performed by: FAMILY MEDICINE

## 2019-02-07 PROCEDURE — 72158 MRI LUMBAR SPINE W/O & W/DYE: CPT

## 2019-02-07 RX ADMIN — GADOTERIDOL 17 ML: 279.3 INJECTION, SOLUTION INTRAVENOUS at 17:45

## 2019-02-12 ENCOUNTER — TELEPHONE (OUTPATIENT)
Dept: FAMILY MEDICINE CLINIC | Facility: CLINIC | Age: 24
End: 2019-02-12

## 2019-02-12 LAB
HCV RNA SERPL NAA+PROBE-ACNC: NORMAL IU/ML
TEST INFORMATION: NORMAL

## 2019-02-12 NOTE — TELEPHONE ENCOUNTER
The patient's MRI does not show any new changes.  He needs to call medical records if he wants a copy of the MRI on a disk for another provider.  Please call and inform him of this.        This document has been electronically signed by Jagruti Taylor MD on February 12, 2019 1:21 PM

## 2019-02-12 NOTE — TELEPHONE ENCOUNTER
DR RAGLAND    PATIENT CALLED RADIOLOGY FOR A COPY OF HIS MRI. HE WAS TOLD HE HAD TO CALL YOU BEFORE HE COULD GET ONE.  HAVE YOU HAD A CHANCE TO VIEW IT?    THANK YOU

## 2019-02-12 NOTE — TELEPHONE ENCOUNTER
Called and spoke with patient letting him his MRI didn't show any new changes and if he needs a copy of it on disc he needs to call medical records instead of radiology

## 2019-02-13 ENCOUNTER — TELEPHONE (OUTPATIENT)
Dept: FAMILY MEDICINE CLINIC | Facility: CLINIC | Age: 24
End: 2019-02-13

## 2019-02-13 DIAGNOSIS — R35.0 FREQUENCY OF URINATION: Primary | ICD-10-CM

## 2019-02-13 DIAGNOSIS — N50.819 TESTICULAR PAIN: ICD-10-CM

## 2019-02-14 NOTE — TELEPHONE ENCOUNTER
Called with results. He is taking the antibiotic, but he still having a lot of pain.  He is hurting in the testicles and radiating upward still.  He is also having increased frequency.  He says that he went 27 times to the restroom yesterday.  He says that the Bentyl is helping a little bit with his crampy abdominal pain.  His bowels have returned to normal.      Will refer to urology          This document has been electronically signed by Dre Crawford MD on February 13, 2019 6:16 PM

## 2019-02-26 ENCOUNTER — TELEPHONE (OUTPATIENT)
Dept: FAMILY MEDICINE CLINIC | Facility: CLINIC | Age: 24
End: 2019-02-26

## 2019-02-26 ENCOUNTER — LAB (OUTPATIENT)
Dept: LAB | Facility: HOSPITAL | Age: 24
End: 2019-02-26

## 2019-02-26 DIAGNOSIS — N41.9 PROSTATITIS, UNSPECIFIED PROSTATITIS TYPE: ICD-10-CM

## 2019-02-26 PROCEDURE — 87661 TRICHOMONAS VAGINALIS AMPLIF: CPT

## 2019-02-26 PROCEDURE — 87591 N.GONORRHOEAE DNA AMP PROB: CPT

## 2019-02-26 PROCEDURE — 87491 CHLMYD TRACH DNA AMP PROBE: CPT

## 2019-02-26 RX ORDER — DEXTROAMPHETAMINE SACCHARATE, AMPHETAMINE ASPARTATE, DEXTROAMPHETAMINE SULFATE AND AMPHETAMINE SULFATE 5; 5; 5; 5 MG/1; MG/1; MG/1; MG/1
20 TABLET ORAL 2 TIMES DAILY
Qty: 60 TABLET | Refills: 0 | Status: SHIPPED | OUTPATIENT
Start: 2019-02-26 | End: 2019-03-14

## 2019-02-27 LAB
C TRACH RRNA CVX QL NAA+PROBE: NEGATIVE
N GONORRHOEA RRNA SPEC QL NAA+PROBE: NEGATIVE

## 2019-03-14 ENCOUNTER — OFFICE VISIT (OUTPATIENT)
Dept: FAMILY MEDICINE CLINIC | Facility: CLINIC | Age: 24
End: 2019-03-14

## 2019-03-14 VITALS — WEIGHT: 144 LBS | BODY MASS INDEX: 20.62 KG/M2 | OXYGEN SATURATION: 99 % | HEIGHT: 70 IN | HEART RATE: 105 BPM

## 2019-03-14 DIAGNOSIS — R10.32 LEFT LOWER QUADRANT PAIN: ICD-10-CM

## 2019-03-14 DIAGNOSIS — F90.2 ATTENTION DEFICIT HYPERACTIVITY DISORDER (ADHD), COMBINED TYPE: Primary | ICD-10-CM

## 2019-03-14 PROCEDURE — 99213 OFFICE O/P EST LOW 20 MIN: CPT | Performed by: FAMILY MEDICINE

## 2019-03-14 RX ORDER — DEXTROAMPHETAMINE SACCHARATE, AMPHETAMINE ASPARTATE, DEXTROAMPHETAMINE SULFATE AND AMPHETAMINE SULFATE 5; 5; 5; 5 MG/1; MG/1; MG/1; MG/1
TABLET ORAL
Qty: 30 TABLET | Refills: 0 | Status: SHIPPED | OUTPATIENT
Start: 2019-03-14 | End: 2019-04-15

## 2019-03-14 RX ORDER — DEXTROAMPHETAMINE SACCHARATE, AMPHETAMINE ASPARTATE MONOHYDRATE, DEXTROAMPHETAMINE SULFATE AND AMPHETAMINE SULFATE 5; 5; 5; 5 MG/1; MG/1; MG/1; MG/1
20 CAPSULE, EXTENDED RELEASE ORAL EVERY MORNING
Qty: 30 CAPSULE | Refills: 0 | Status: SHIPPED | OUTPATIENT
Start: 2019-03-14 | End: 2019-04-15

## 2019-03-14 NOTE — PROGRESS NOTES
Subjective:     Antione Villaseñor is a 23 y.o. male who presents for follow up for ADHD and abdominal pain:    ADHD:             He has a history of ADHD since he was a child.  He was treated at that time, but later stopped taking patient for his ADHD. He was reevaluated by Dr. Galicia who determined that he does have ADHD.  He was restarted on medication. He tried multiple stimulant medications in the past. He works at the [employer].  Has been taking Adderall immediate release 20 mg twice a day.  He feels that this was working well until approximately 3 months ago.  Since that time he has been having increased difficulty with concentration at work.  He feels that the afternoon dose does not control his symptoms well.  He has tried long-acting medication in the past, but felt this did not help control his symptoms well.  He is open to trying an extended release at this time or increase the dosage of the short acting.    Stomach Pain:   He has lost 22 pounds in the last 2 months. He has been having bright red blood when he wipes. He has external hemorrhoids. He felt he had a painful mass outside of his rectum. This lasted 2 days, and resolved. He had not been straining before this. He had bright red blood during that time. He denies any melena. He has intermittent episodes of left lower quadrant pain. He has had several episodes of this last 3 months.  He had one episode that lasted for 3 weeks.  During this time he had watery bowel movements.  He would have left lower quadrant pain that remained constant throughout the day.  This should worsening until he had a bowel movement, then this pain would improve.  The pain radiates to his left testicle.This is aching all day. The pain increased until he has a BM. The first time this happen, he had watery BM's for 3 weeks. He has no family hx of autoimmune disorders. He has not had any rashes or skin changes. He does not have problems with conjunctivitis. He has been  working a lot in this time.     Preventative:  Over the past 2 weeks, have you felt down, depressed, or hopeless?No   Over the past 2 weeks, have you felt little interest or pleasure in doing things?No  Clinical depression screening refused by patient.No     On osteoporosis therapy?Not Indicated     Past Medical Hx:  Past Medical History:   Diagnosis Date   • ADHD (attention deficit hyperactivity disorder)    • Ankle joint pain    • Bipolar disorder (CMS/HCC)    • Burning feet syndrome    • Depressive disorder    • Dysuria     Dysuria - OVER ACTIVE BLADDER VS POLYURIA      • Encounter for general adult medical examination without abnormal findings    • Heart murmur    • Infertile male syndrome    • Intervertebral disc disorders with radiculopathy, lumbar region    • Low back pain    • Male hypogonadism     Male hypogonadism - not receiving testosterone replacement by urology      • Memory loss    • Pain in unspecified foot    • Palpitations        Past Surgical Hx:  Past Surgical History:   Procedure Laterality Date   • INJECTION OF MEDICATION      Kenalog (1)      10/05/2015           Health Maintenance:  Health Maintenance   Topic Date Due   • ANNUAL PHYSICAL  06/08/2019   • TDAP/TD VACCINES (2 - Td) 01/01/2021   • INFLUENZA VACCINE  Completed       Current Meds:    Current Outpatient Medications:   •  dicyclomine (BENTYL) 10 MG capsule, Take 1 capsule by mouth 4 (Four) Times a Day As Needed (abd pain/cramping)., Disp: 20 capsule, Rfl: 0  •  doxycycline (MONODOX) 100 MG capsule, Take 1 capsule by mouth 2 (Two) Times a Day., Disp: 28 capsule, Rfl: 0  •  amphetamine-dextroamphetamine (ADDERALL) 20 MG tablet, Take 1 tablet in the afternoon., Disp: 30 tablet, Rfl: 0  •  amphetamine-dextroamphetamine XR (ADDERALL XR) 20 MG 24 hr capsule, Take 1 capsule by mouth Every Morning, Disp: 30 capsule, Rfl: 0    Allergies:  Patient has no known allergies.    Family Hx:  Family History   Problem Relation Age of Onset   • Asthma  "Other    • Diabetes Other    • Heart disease Other    • Hypertension Other    • Lung disease Other    • Coronary artery disease Father    • Hypertension Father         Social History:  Social History     Socioeconomic History   • Marital status:      Spouse name: Not on file   • Number of children: Not on file   • Years of education: Not on file   • Highest education level: Not on file   Social Needs   • Financial resource strain: Not on file   • Food insecurity - worry: Not on file   • Food insecurity - inability: Not on file   • Transportation needs - medical: Not on file   • Transportation needs - non-medical: Not on file   Occupational History   • Not on file   Tobacco Use   • Smoking status: Never Smoker   • Smokeless tobacco: Never Used   Substance and Sexual Activity   • Alcohol use: No   • Drug use: No   • Sexual activity: Yes     Partners: Female   Other Topics Concern   • Not on file   Social History Narrative   • Not on file       Review of Systems  Review of Systems   Constitutional: Negative for chills and fever.   HENT: Negative for congestion, ear pain, hearing loss, rhinorrhea, sore throat and trouble swallowing.    Eyes: Negative for pain and visual disturbance.   Respiratory: Negative for cough, shortness of breath and wheezing.    Cardiovascular: Negative for chest pain and palpitations.   Gastrointestinal: Negative for abdominal pain, constipation, diarrhea, nausea and vomiting.   Genitourinary: Negative for dysuria and hematuria.   Musculoskeletal: Negative for back pain and neck pain.   Skin: Negative for rash and wound.   Neurological: Negative for dizziness, syncope and headaches.   Psychiatric/Behavioral: Negative for dysphoric mood. The patient is not nervous/anxious.        Objective:     Pulse 105   Ht 177.8 cm (70\")   Wt 65.3 kg (144 lb)   SpO2 99%   BMI 20.66 kg/m²   Physical Exam   Constitutional: He is oriented to person, place, and time. He appears well-developed and " well-nourished.   HENT:   Head: Normocephalic and atraumatic.   Mouth/Throat: Oropharynx is clear and moist.   Eyes: Conjunctivae and EOM are normal. Pupils are equal, round, and reactive to light.   Neck: Normal range of motion. Neck supple. No tracheal deviation present. No thyromegaly present.   Cardiovascular: Normal rate, regular rhythm and normal heart sounds. Exam reveals no gallop and no friction rub.   No murmur heard.  Pulmonary/Chest: Effort normal and breath sounds normal. No respiratory distress. He has no wheezes. He has no rales.   Abdominal: Soft. Bowel sounds are normal. He exhibits no distension. There is no tenderness.   Musculoskeletal: Normal range of motion. He exhibits no tenderness.   Lymphadenopathy:     He has no cervical adenopathy.   Neurological: He is alert and oriented to person, place, and time. No cranial nerve deficit.   Skin: Skin is warm and dry. No rash noted. No erythema.   Psychiatric: He has a normal mood and affect. His behavior is normal. Thought content normal.   Vitals reviewed.    Assessment/Plan:     Antione was seen today for adhd.    Diagnoses and all orders for this visit:    Attention deficit hyperactivity disorder (ADHD), combined type: This is a chronic problem.  I will change his Adderall to Adderall extended release 20 in the morning.  I will continue the Adderall 20 mg immediate release in the afternoons.  -     amphetamine-dextroamphetamine XR (ADDERALL XR) 20 MG 24 hr capsule; Take 1 capsule by mouth Every Morning  -     amphetamine-dextroamphetamine (ADDERALL) 20 MG tablet; Take 1 tablet in the afternoon.  Left lower quadrant pain: This is a new problem.  As the patient has had intermittent bouts of abdominal pain with diarrhea, intermittent hematochezia, and 22 pound weight loss, I will refer the patient to gastroenterology for evaluation for a colonoscopy.  -     Ambulatory Referral to Gastroenterology        Follow-up:     Return in about 3 months (around  6/14/2019).      Goals        Patient Stated    • Patient reports that he would like to have improvment of his memory.  (pt-stated)      Barriers to goal: None            Preventative:    Vaccines Recommended at this visit:   No Vaccines recommended today. Patient is up to date on all vaccines.     Vaccines Received at this visit:  No Vaccines recommended today. Patient is up to date on all vaccines.     Screenings Recommended at this visit:  No Screenings offered today. Patient is up to date on all screenings at this time.     Screenings Ordered at this visit:  No screenings were offered today. Patient is up to date on all screenings.     Smoking Status:  Patient has never smoked.    Alcohol Intake:  Patient does not drink    Patient's Body mass index is 20.66 kg/m². BMI is within normal parameters. No follow-up required..         RISK SCORE: 4              This document has been electronically signed by Memo Hall MD on March 19, 2019 3:46 PM

## 2019-03-20 NOTE — PROGRESS NOTES
I have reviewed the notes, assessments, and/or procedures performed by Memo Hall MD, I concur with her/his documentation and assessment and plan for Antione Villaseñor.                This document has been electronically signed by Juvenal Rodgers MD on March 20, 2019 8:25 AM

## 2019-03-25 ENCOUNTER — APPOINTMENT (OUTPATIENT)
Dept: LAB | Facility: HOSPITAL | Age: 24
End: 2019-03-25

## 2019-03-25 ENCOUNTER — OFFICE VISIT (OUTPATIENT)
Dept: GASTROENTEROLOGY | Facility: CLINIC | Age: 24
End: 2019-03-25

## 2019-03-25 VITALS
HEART RATE: 107 BPM | SYSTOLIC BLOOD PRESSURE: 144 MMHG | WEIGHT: 168.2 LBS | HEIGHT: 70 IN | DIASTOLIC BLOOD PRESSURE: 78 MMHG | BODY MASS INDEX: 24.08 KG/M2

## 2019-03-25 DIAGNOSIS — N50.82 SCROTAL PAIN: Primary | ICD-10-CM

## 2019-03-25 DIAGNOSIS — K92.1 MELENA: Primary | ICD-10-CM

## 2019-03-25 DIAGNOSIS — R11.0 NAUSEA: ICD-10-CM

## 2019-03-25 DIAGNOSIS — R10.30 LOWER ABDOMINAL PAIN: ICD-10-CM

## 2019-03-25 DIAGNOSIS — R19.7 DIARRHEA, UNSPECIFIED TYPE: ICD-10-CM

## 2019-03-25 DIAGNOSIS — R63.4 WEIGHT LOSS, ABNORMAL: ICD-10-CM

## 2019-03-25 DIAGNOSIS — K62.89 ANAL OR RECTAL PAIN: ICD-10-CM

## 2019-03-25 LAB
ALBUMIN SERPL-MCNC: 4.5 G/DL (ref 3.5–5.2)
ALBUMIN/GLOB SERPL: 1.7 G/DL
ALP SERPL-CCNC: 52 U/L (ref 39–117)
ALT SERPL W P-5'-P-CCNC: 32 U/L (ref 1–41)
ANION GAP SERPL CALCULATED.3IONS-SCNC: 13.4 MMOL/L
AST SERPL-CCNC: 28 U/L (ref 1–40)
BASOPHILS # BLD AUTO: 0.03 10*3/MM3 (ref 0–0.2)
BASOPHILS NFR BLD AUTO: 0.5 % (ref 0–1.5)
BILIRUB SERPL-MCNC: 0.5 MG/DL (ref 0.2–1.2)
BUN BLD-MCNC: 12 MG/DL (ref 6–20)
BUN/CREAT SERPL: 13.2 (ref 7–25)
CALCIUM SPEC-SCNC: 9.3 MG/DL (ref 8.6–10.5)
CHLORIDE SERPL-SCNC: 103 MMOL/L (ref 98–107)
CO2 SERPL-SCNC: 25.6 MMOL/L (ref 22–29)
CREAT BLD-MCNC: 0.91 MG/DL (ref 0.76–1.27)
CRP SERPL-MCNC: 0.03 MG/DL (ref 0–0.5)
DEPRECATED RDW RBC AUTO: 38.6 FL (ref 37–54)
EOSINOPHIL # BLD AUTO: 0.02 10*3/MM3 (ref 0–0.4)
EOSINOPHIL NFR BLD AUTO: 0.3 % (ref 0.3–6.2)
ERYTHROCYTE [DISTWIDTH] IN BLOOD BY AUTOMATED COUNT: 11.8 % (ref 12.3–15.4)
ERYTHROCYTE [SEDIMENTATION RATE] IN BLOOD: 2 MM/HR (ref 0–15)
GFR SERPL CREATININE-BSD FRML MDRD: 102 ML/MIN/1.73
GLOBULIN UR ELPH-MCNC: 2.6 GM/DL
GLUCOSE BLD-MCNC: 90 MG/DL (ref 65–99)
HCT VFR BLD AUTO: 45.6 % (ref 37.5–51)
HGB BLD-MCNC: 15.4 G/DL (ref 13–17.7)
IMM GRANULOCYTES # BLD AUTO: 0.01 10*3/MM3 (ref 0–0.05)
IMM GRANULOCYTES NFR BLD AUTO: 0.2 % (ref 0–0.5)
INR PPP: 1.17 (ref 0.8–1.2)
LYMPHOCYTES # BLD AUTO: 1.53 10*3/MM3 (ref 0.7–3.1)
LYMPHOCYTES NFR BLD AUTO: 25.2 % (ref 19.6–45.3)
MCH RBC QN AUTO: 30.3 PG (ref 26.6–33)
MCHC RBC AUTO-ENTMCNC: 33.8 G/DL (ref 31.5–35.7)
MCV RBC AUTO: 89.6 FL (ref 79–97)
MONOCYTES # BLD AUTO: 0.61 10*3/MM3 (ref 0.1–0.9)
MONOCYTES NFR BLD AUTO: 10 % (ref 5–12)
NEUTROPHILS # BLD AUTO: 3.87 10*3/MM3 (ref 1.4–7)
NEUTROPHILS NFR BLD AUTO: 63.8 % (ref 42.7–76)
NRBC BLD AUTO-RTO: 0 /100 WBC (ref 0–0)
PLATELET # BLD AUTO: 214 10*3/MM3 (ref 140–450)
PMV BLD AUTO: 10.9 FL (ref 6–12)
POTASSIUM BLD-SCNC: 4.5 MMOL/L (ref 3.5–5.2)
PROT SERPL-MCNC: 7.1 G/DL (ref 6–8.5)
PROTHROMBIN TIME: 14.6 SECONDS (ref 11.1–15.3)
RBC # BLD AUTO: 5.09 10*6/MM3 (ref 4.14–5.8)
SODIUM BLD-SCNC: 142 MMOL/L (ref 136–145)
TSH SERPL DL<=0.05 MIU/L-ACNC: 2.03 MIU/ML (ref 0.27–4.2)
WBC NRBC COR # BLD: 6.07 10*3/MM3 (ref 3.4–10.8)

## 2019-03-25 PROCEDURE — 85652 RBC SED RATE AUTOMATED: CPT | Performed by: PHYSICIAN ASSISTANT

## 2019-03-25 PROCEDURE — 80053 COMPREHEN METABOLIC PANEL: CPT | Performed by: PHYSICIAN ASSISTANT

## 2019-03-25 PROCEDURE — 99214 OFFICE O/P EST MOD 30 MIN: CPT | Performed by: PHYSICIAN ASSISTANT

## 2019-03-25 PROCEDURE — 85025 COMPLETE CBC W/AUTO DIFF WBC: CPT | Performed by: PHYSICIAN ASSISTANT

## 2019-03-25 PROCEDURE — 36415 COLL VENOUS BLD VENIPUNCTURE: CPT | Performed by: PHYSICIAN ASSISTANT

## 2019-03-25 PROCEDURE — 85610 PROTHROMBIN TIME: CPT | Performed by: PHYSICIAN ASSISTANT

## 2019-03-25 PROCEDURE — 86140 C-REACTIVE PROTEIN: CPT | Performed by: PHYSICIAN ASSISTANT

## 2019-03-25 PROCEDURE — 84443 ASSAY THYROID STIM HORMONE: CPT | Performed by: PHYSICIAN ASSISTANT

## 2019-03-25 RX ORDER — SODIUM, POTASSIUM,MAG SULFATES 17.5-3.13G
SOLUTION, RECONSTITUTED, ORAL ORAL
Qty: 1 BOTTLE | Refills: 0 | Status: SHIPPED | OUTPATIENT
Start: 2019-03-25 | End: 2019-04-19 | Stop reason: HOSPADM

## 2019-03-25 RX ORDER — DEXTROSE AND SODIUM CHLORIDE 5; .45 G/100ML; G/100ML
30 INJECTION, SOLUTION INTRAVENOUS CONTINUOUS PRN
Status: CANCELLED | OUTPATIENT
Start: 2019-04-19

## 2019-03-25 NOTE — PROGRESS NOTES
Chief Complaint   Patient presents with   • LLQ Pain     Ref. Dr. Hall       ENDO PROCEDURE ORDERED: EGD/COLON n, abd pain, melena, blood stool, diarrhea    Subjective    Antione Villaseñor is a 23 y.o. male. he is being seen for consultation today at the request of Dr. Hall.  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 He was sent for consultation for abdominal pain by Dr. Hall who saw the patient for followup on stomach pain and bleeding on 03/14/2019. Patient states for at least the past 2 months he has had 2 out of 10 left lower quadrant pain. He states he has had sciatica for years and has taken a lot of Tylenol in the past for that. He states he generally takes 500 to 1000 mg daily for the pain. He tends to have a lot of heartburn. He states he avoids eating because of this. He will take Tums and Rolaids and they do seem to help. He has some nausea associated with bowel movements. Sometimes he gets severe nausea with bowel movements and feels like he is going to pass out. He may have loose to watery stools that may go on for several days at a time. About 20 to 30 minutes before he has a bowel movement he states he can feel the stool moving through a certain place in his abdomen. He feels like it has to pass through that area and then he feels fine. He states he did pass out 1 time and was very diaphoretic with the bowel movement. He states he has had some black, foul-smelling stools, as well as bright red and dark stools with clots. He feels like something swells in his perianal area. Recently he states he has passed a lot of mucus in his stool. He does take Pepto-Bismol on occasion but does not think it is associated with the black stools. He also complains of frequent urination. He states he has tried to drink more but already urinates about 20 times a day.     The patient has recently had some weight changes. He states since he was 13 years old  he could gain or lose 15 to 30 pounds in a short amount of time. According to the scales the patient had lost 24 pounds recently; patient states he had lost 17 pounds by his scale at home, but he was working a lot of shifts and not really eating. His weight is within 2 pounds of his weight when he was seen at urgent care in January. He has never had endoscopy. He denies using illicit substances.    Patient states he has sciatica with problems on the left side with pain in the left groin. He states he did see orthopedics but does not think he was treated very well. I did look at some of his old films and it appeared that his pelvis was not aligned appropriately, and he states that he was told his left leg was approximately 1 inch shorter, but it does get better with therapy and stretching. He does wear inserts in his shoe. He states he broke his left ankle 3 times and this apparently resulted in shortening of that leg. I encouraged him to follow up with orthopedics. It is not clear what can be done with that. He did see Dr. Vasquez.     Studies done previously when he was seen in the urgent care for constipation: Flat and upright showed fecal debris. I think they appear to be somewhat air-fluid levels and his pelvis appeared malaligned on 2019. He again went to urgent care 2019 and urinalysis was negative but they did not do any other studies. He had a negative GC/chlamydia on 2019.  Laboratories on 2019 showed negative RPR, negative urine culture, negative HSV, HIV, HCV RNA by PCR quantitative was not detected. Urinalysis showed protein.     Patient currently denies tobacco, alcohol, and illicit substance use. He did use tobacco for 1 year. He is on Adderall for his ADHD. No past surgical history.     Family history of gallstones. Father  of brain aneurysm at age 46. Mother age 48 with liver issues. Spouse in good health. Brother and sister in good health, both have had gallbladder and  appendectomy. One child in good health.     A/P: Patient with significant abdominal pain, GERD, nausea, variable bowel habits, questionable GI bleeding. Perianal disease which initially he described sounded like a possible fistula. I did not see anything that appears to look like that on exam, but I am not sure what the nodule on his perianal region is from. He did not appear to have any significant hemorrhoidal disease and I would agree with Dr. Hall for that. I did suggest EGD/colonoscopy with biopsies. Will attempt to look at the terminal ileum. Will do small bowel biopsies to evaluate for possible celiac disease. I did recommend CRP, sed rate, CBC, CMP, and TSH. Recommend abdominal ultrasound. Certainly inflammatory bowel disease cannot be entirely excluded. Would consider a malabsorptive syndrome. Would consider renal cause. I considered doing further studies on his urine given his frequency but will pursue the above with further pending clinical course and the results of the above.     Thank you very much, Dr. Hall, for this consultation and for allowing us to participate in the care of your patient. Will keep you informed.        The following portions of the patient's history were reviewed and updated as appropriate:   Past Medical History:   Diagnosis Date   • ADHD (attention deficit hyperactivity disorder)    • Ankle joint pain    • Bipolar disorder (CMS/HCC)    • Burning feet syndrome    • Depressive disorder    • Dysuria     Dysuria - OVER ACTIVE BLADDER VS POLYURIA      • Encounter for general adult medical examination without abnormal findings    • Heart murmur    • Infertile male syndrome    • Intervertebral disc disorders with radiculopathy, lumbar region    • Low back pain    • Male hypogonadism     Male hypogonadism - not receiving testosterone replacement by urology      • Memory loss    • Pain in unspecified foot    • Palpitations      Past Surgical History:   Procedure Laterality Date   •  "INJECTION OF MEDICATION      Kenalog (1)      10/05/2015         Family History   Problem Relation Age of Onset   • Asthma Other    • Diabetes Other    • Heart disease Other    • Hypertension Other    • Lung disease Other    • Coronary artery disease Father    • Hypertension Father    • Liver disease Mother        No Known Allergies  Social History     Socioeconomic History   • Marital status:      Spouse name: Not on file   • Number of children: Not on file   • Years of education: Not on file   • Highest education level: Not on file   Tobacco Use   • Smoking status: Former Smoker   • Smokeless tobacco: Former User   Substance and Sexual Activity   • Alcohol use: No   • Drug use: No   • Sexual activity: Yes     Partners: Female       Current Outpatient Medications:   •  amphetamine-dextroamphetamine (ADDERALL) 20 MG tablet, Take 1 tablet in the afternoon., Disp: 30 tablet, Rfl: 0  •  amphetamine-dextroamphetamine XR (ADDERALL XR) 20 MG 24 hr capsule, Take 1 capsule by mouth Every Morning, Disp: 30 capsule, Rfl: 0  •  sodium-potassium-magnesium sulfates (SUPREP BOWEL PREP KIT) 17.5-3.13-1.6 GM/177ML solution oral solution, As directed per instruction sheet for colonoscopy, Disp: 1 bottle, Rfl: 0  Review of Systems  Review of Systems   Constitutional: Positive for fatigue and unexpected weight change.   HENT: Negative for trouble swallowing.    Gastrointestinal: Positive for abdominal distention, abdominal pain, anal bleeding, blood in stool, constipation, diarrhea and nausea. Negative for rectal pain and vomiting.   Musculoskeletal: Positive for back pain.   Neurological: Negative for dizziness.   All other systems reviewed and are negative.         Objective    /78 (BP Location: Left arm)   Pulse 107   Ht 177.8 cm (70\")   Wt 76.3 kg (168 lb 3.2 oz)   BMI 24.13 kg/m²   Physical Exam   Constitutional: He is oriented to person, place, and time. He appears well-developed and well-nourished. No distress. "   HENT:   Head: Normocephalic and atraumatic.   Eyes: EOM are normal. Pupils are equal, round, and reactive to light.   Neck: Normal range of motion.   Cardiovascular: Normal rate, regular rhythm and normal heart sounds.   Pulmonary/Chest: Effort normal and breath sounds normal.   Abdominal: Soft. Bowel sounds are normal. He exhibits no shifting dullness, no distension, no abdominal bruit, no ascites and no mass. There is no hepatosplenomegaly. There is tenderness. There is no rigidity, no rebound, no guarding and no CVA tenderness. No hernia. Hernia confirmed negative in the ventral area.   Low abd   Genitourinary:   Genitourinary Comments: External exam: small nodule less than 1cm right of schincter, no other tenderness or lesions.   Musculoskeletal: Normal range of motion.   Neurological: He is alert and oriented to person, place, and time.   Skin: Skin is warm and dry.   Psychiatric: He has a normal mood and affect. His behavior is normal. Judgment and thought content normal.   Nursing note and vitals reviewed.    Assessment/Plan      1. Melena    2. Nausea    3. Lower abdominal pain    4. Anal or rectal pain    5. Weight loss, abnormal    6. Diarrhea, unspecified type    .   Antione was seen today for llq pain.    Diagnoses and all orders for this visit:    Melena  -     CBC & Differential  -     Comprehensive Metabolic Panel  -     TSH  -     Protime-INR  -     C-reactive Protein  -     Sedimentation Rate  -     US Abdomen Complete  -     Case Request; Standing  -     dextrose 5 % and sodium chloride 0.45 % infusion  -     Case Request  -     CBC Auto Differential    Nausea  -     CBC & Differential  -     Comprehensive Metabolic Panel  -     TSH  -     Protime-INR  -     C-reactive Protein  -     Sedimentation Rate  -     US Abdomen Complete  -     Case Request; Standing  -     dextrose 5 % and sodium chloride 0.45 % infusion  -     Case Request  -     CBC Auto Differential    Lower abdominal pain  -     CBC &  Differential  -     Comprehensive Metabolic Panel  -     TSH  -     Protime-INR  -     C-reactive Protein  -     Sedimentation Rate  -     US Abdomen Complete  -     Case Request; Standing  -     dextrose 5 % and sodium chloride 0.45 % infusion  -     Case Request  -     CBC Auto Differential    Anal or rectal pain  -     CBC & Differential  -     Comprehensive Metabolic Panel  -     TSH  -     Protime-INR  -     C-reactive Protein  -     Sedimentation Rate  -     US Abdomen Complete  -     Case Request; Standing  -     dextrose 5 % and sodium chloride 0.45 % infusion  -     Case Request  -     CBC Auto Differential    Weight loss, abnormal  -     CBC & Differential  -     Comprehensive Metabolic Panel  -     TSH  -     Protime-INR  -     C-reactive Protein  -     Sedimentation Rate  -     US Abdomen Complete  -     Case Request; Standing  -     dextrose 5 % and sodium chloride 0.45 % infusion  -     Case Request  -     CBC Auto Differential    Diarrhea, unspecified type  -     CBC & Differential  -     Comprehensive Metabolic Panel  -     TSH  -     Protime-INR  -     C-reactive Protein  -     Sedimentation Rate  -     US Abdomen Complete  -     Case Request; Standing  -     dextrose 5 % and sodium chloride 0.45 % infusion  -     Case Request  -     CBC Auto Differential    Other orders  -     Follow Anesthesia Guidelines / Standing Orders; Future  -     Obtain Informed Consent; Standing  -     POC Glucose Once; Standing  -     sodium-potassium-magnesium sulfates (SUPREP BOWEL PREP KIT) 17.5-3.13-1.6 GM/177ML solution oral solution; As directed per instruction sheet for colonoscopy        Orders placed during this encounter include:  Orders Placed This Encounter   Procedures   • US Abdomen Complete     Order Specific Question:   Reason for Exam:     Answer:   abd pain, weight loss, nausea   • Comprehensive Metabolic Panel   • TSH   • Protime-INR   • C-reactive Protein   • Sedimentation Rate   • CBC Auto  Differential   • Follow Anesthesia Guidelines / Standing Orders     Standing Status:   Future   • CBC & Differential     Order Specific Question:   Manual Differential     Answer:   No       Medications prescribed:  New Medications Ordered This Visit   Medications   • sodium-potassium-magnesium sulfates (SUPREP BOWEL PREP KIT) 17.5-3.13-1.6 GM/177ML solution oral solution     Sig: As directed per instruction sheet for colonoscopy     Dispense:  1 bottle     Refill:  0     Discontinued Medications       Reason for Discontinue    dicyclomine (BENTYL) 10 MG capsule Not Efficacious    doxycycline (MONODOX) 100 MG capsule Non-compliance        Requested Prescriptions     Signed Prescriptions Disp Refills   • sodium-potassium-magnesium sulfates (SUPREP BOWEL PREP KIT) 17.5-3.13-1.6 GM/177ML solution oral solution 1 bottle 0     Sig: As directed per instruction sheet for colonoscopy       Review and/or summary of lab tests, radiology, procedures, medications. Review and summary of old records and obtaining of history. The risks and benefits of my recommendations, as well as other treatment options were discussed with the patient today. Questions were answered.    Follow-up: Return in about 1 month (around 4/25/2019), or if symptoms worsen or fail to improve.     ESOPHAGOGASTRODUODENOSCOPY (N/A), COLONOSCOPY (N/A)      This document has been electronically signed by Andres Mendez PA-C on March 31, 2019 1:45 PM      Results for orders placed or performed in visit on 03/25/19   CBC Auto Differential   Result Value Ref Range    WBC 6.07 3.40 - 10.80 10*3/mm3    RBC 5.09 4.14 - 5.80 10*6/mm3    Hemoglobin 15.4 13.0 - 17.7 g/dL    Hematocrit 45.6 37.5 - 51.0 %    MCV 89.6 79.0 - 97.0 fL    MCH 30.3 26.6 - 33.0 pg    MCHC 33.8 31.5 - 35.7 g/dL    RDW 11.8 (L) 12.3 - 15.4 %    RDW-SD 38.6 37.0 - 54.0 fl    MPV 10.9 6.0 - 12.0 fL    Platelets 214 140 - 450 10*3/mm3    Neutrophil % 63.8 42.7 - 76.0 %    Lymphocyte % 25.2 19.6 -  45.3 %    Monocyte % 10.0 5.0 - 12.0 %    Eosinophil % 0.3 0.3 - 6.2 %    Basophil % 0.5 0.0 - 1.5 %    Immature Grans % 0.2 0.0 - 0.5 %    Neutrophils, Absolute 3.87 1.40 - 7.00 10*3/mm3    Lymphocytes, Absolute 1.53 0.70 - 3.10 10*3/mm3    Monocytes, Absolute 0.61 0.10 - 0.90 10*3/mm3    Eosinophils, Absolute 0.02 0.00 - 0.40 10*3/mm3    Basophils, Absolute 0.03 0.00 - 0.20 10*3/mm3    Immature Grans, Absolute 0.01 0.00 - 0.05 10*3/mm3    nRBC 0.0 0.0 - 0.0 /100 WBC   Sedimentation Rate   Result Value Ref Range    Sed Rate 2 0 - 15 mm/hr   Protime-INR   Result Value Ref Range    Protime 14.6 11.1 - 15.3 Seconds    INR 1.17 0.80 - 1.20   C-reactive Protein   Result Value Ref Range    C-Reactive Protein 0.03 0.00 - 0.50 mg/dL   TSH   Result Value Ref Range    TSH 2.030 0.270 - 4.200 mIU/mL   Comprehensive Metabolic Panel   Result Value Ref Range    Glucose 90 65 - 99 mg/dL    BUN 12 6 - 20 mg/dL    Creatinine 0.91 0.76 - 1.27 mg/dL    Sodium 142 136 - 145 mmol/L    Potassium 4.5 3.5 - 5.2 mmol/L    Chloride 103 98 - 107 mmol/L    CO2 25.6 22.0 - 29.0 mmol/L    Calcium 9.3 8.6 - 10.5 mg/dL    Total Protein 7.1 6.0 - 8.5 g/dL    Albumin 4.50 3.50 - 5.20 g/dL    ALT (SGPT) 32 1 - 41 U/L    AST (SGOT) 28 1 - 40 U/L    Alkaline Phosphatase 52 39 - 117 U/L    Total Bilirubin 0.5 0.2 - 1.2 mg/dL    eGFR Non African Amer 102 >60 mL/min/1.73    Globulin 2.6 gm/dL    A/G Ratio 1.7 g/dL    BUN/Creatinine Ratio 13.2 7.0 - 25.0    Anion Gap 13.4 mmol/L   Results for orders placed or performed in visit on 02/26/19   Chlamydia trachomatis, Neisseria gonorrhoeae, Trichomonas vaginalis, PCR - Swab, Urine, Clean Catch   Result Value Ref Range    Chlamydia DNA by PCR Negative Negative    Neisseria gonorrhoeae by PCR Negative Negative   Results for orders placed or performed in visit on 01/28/19   HSV 1 and 2 IgM, Abs, Indirect   Result Value Ref Range    HSV 1 IgM <1:10 <1:10 titer    HSV 2 IgM <1:10 <1:10 titer   HSV 1 and 2-Specific  Ab, IgG   Result Value Ref Range    HSV 1 IgG, Type Specific <0.91 0.00 - 0.90 index    HSV 2 IgG <0.91 0.00 - 0.90 index   HIV 2 Antibody Screen w reflex   Result Value Ref Range    HIV-2 Ab Negative Negative   Hepatitis C RNA, quantitative, PCR (graph)   Result Value Ref Range    Hepatitis C Quantitation HCV Not Detected IU/mL    Test Information Comment    RPR   Result Value Ref Range    RPR Non-Reactive Non-Reactive   Results for orders placed or performed in visit on 01/28/19   POCT urinalysis dipstick, manual   Result Value Ref Range    Color Dark Yellow Yellow, Straw, Dark Yellow, Shasha    Clarity, UA Clear Clear    Glucose, UA Negative Negative, 1000 mg/dL (3+) mg/dL    Bilirubin Negative Negative    Ketones, UA Negative Negative    Specific Gravity  1.005 1.005 - 1.030    Blood, UA Negative Negative    pH, Urine 8.5 (A) 5.0 - 8.0    Protein, POC Trace (A) Negative mg/dL    Urobilinogen, UA Normal Normal    Leukocytes Negative Negative    Nitrite, UA Negative Negative   Urine Culture - Urine, Urine, Clean Catch   Result Value Ref Range    Urine Culture No growth at 24 hours    Results for orders placed or performed during the hospital encounter of 01/19/19   POC Urinalysis Dipstick, Multipro (Automated dipstick)   Result Value Ref Range    Color Yellow Yellow, Straw, Dark Yellow, Shasha    Clarity, UA Clear Clear    Glucose, UA Negative Negative, 1000 mg/dL (3+) mg/dL    Bilirubin Negative Negative    Ketones, UA Negative Negative    Specific Gravity  1.015 1.005 - 1.030    Blood, UA Negative Negative    pH, Urine 7.0 5.0 - 8.0    Protein, POC Negative Negative mg/dL    Urobilinogen, UA Normal Normal    Nitrite, UA Negative Negative    Leukocytes Negative Negative   Results for orders placed or performed in visit on 01/17/19   Compliance Drug Analysis, Ur - Urine, Clean Catch   Result Value Ref Range    Report Summary FINAL    Results for orders placed or performed in visit on 08/14/18   ToxASSURE Select 13  (MW) - Urine, Clean Catch   Result Value Ref Range    Report Summary FINAL    Results for orders placed or performed in visit on 07/17/18   Urine Drug Screen - Urine, Clean Catch   Result Value Ref Range    Amphetamine Screen, Urine Negative Negative    Barbiturates Screen, Urine Negative Negative    Benzodiazepine Screen, Urine Negative Negative    Cocaine Screen, Urine Negative Negative    Methadone Screen, Urine Negative Negative    Opiate Screen Negative Negative    Oxycodone Screen, Urine Negative Negative    THC, Screen, Urine Negative Negative   Results for orders placed or performed in visit on 05/31/18   ToxASSURE Select 13 (MW) - Urine, Clean Catch   Result Value Ref Range    Report Summary FINAL    Results for orders placed or performed during the hospital encounter of 04/29/18   Gold Top - SST   Result Value Ref Range    Extra Tube Hold for add-ons.    Green Top (Gel)   Result Value Ref Range    Extra Tube Hold for add-ons.    Urinalysis With / Microscopic If Indicated - Urine, Clean Catch   Result Value Ref Range    Color, UA Yellow Yellow, Straw, Dark Yellow, Shasha    Appearance, UA Clear Clear    pH, UA 6.0 5.0 - 9.0    Specific [place], UA 1.015 1.003 - 1.030    Glucose, UA Negative Negative    Ketones, UA Negative Negative    Bilirubin, UA Negative Negative    Blood, UA Negative Negative    Protein, UA Negative Negative    Leuk Esterase, UA Negative Negative    Nitrite, UA Negative Negative    Urobilinogen, UA 1.0 E.U./dL 0.2 - 1.0 E.U./dL     *Note: Due to a large number of results and/or encounters for the requested time period, some results have not been displayed. A complete set of results can be found in Results Review.       Some portions of this note have been dictated using voice recognition software and may contain errors and/or omissions.

## 2019-03-25 NOTE — PATIENT INSTRUCTIONS

## 2019-04-03 ENCOUNTER — APPOINTMENT (OUTPATIENT)
Dept: ULTRASOUND IMAGING | Facility: HOSPITAL | Age: 24
End: 2019-04-03

## 2019-04-08 ENCOUNTER — HOSPITAL ENCOUNTER (OUTPATIENT)
Dept: ULTRASOUND IMAGING | Facility: HOSPITAL | Age: 24
Discharge: HOME | End: 2019-04-08
Admitting: PHYSICIAN ASSISTANT

## 2019-04-08 PROCEDURE — 76700 US EXAM ABDOM COMPLETE: CPT

## 2019-04-15 ENCOUNTER — OFFICE VISIT (OUTPATIENT)
Dept: FAMILY MEDICINE CLINIC | Facility: CLINIC | Age: 24
End: 2019-04-15

## 2019-04-15 VITALS
HEART RATE: 100 BPM | HEIGHT: 70 IN | BODY MASS INDEX: 23.77 KG/M2 | SYSTOLIC BLOOD PRESSURE: 126 MMHG | DIASTOLIC BLOOD PRESSURE: 80 MMHG | WEIGHT: 166 LBS | OXYGEN SATURATION: 99 %

## 2019-04-15 DIAGNOSIS — F90.0 ADHD, PREDOMINANTLY INATTENTIVE TYPE: Primary | ICD-10-CM

## 2019-04-15 PROCEDURE — 99213 OFFICE O/P EST LOW 20 MIN: CPT | Performed by: FAMILY MEDICINE

## 2019-04-15 RX ORDER — DEXTROAMPHETAMINE SACCHARATE, AMPHETAMINE ASPARTATE MONOHYDRATE, DEXTROAMPHETAMINE SULFATE AND AMPHETAMINE SULFATE 5; 5; 5; 5 MG/1; MG/1; MG/1; MG/1
20 CAPSULE, EXTENDED RELEASE ORAL EVERY MORNING
Qty: 30 CAPSULE | Refills: 0 | Status: SHIPPED | OUTPATIENT
Start: 2019-04-15 | End: 2019-05-17 | Stop reason: SDUPTHER

## 2019-04-15 RX ORDER — DEXTROAMPHETAMINE SACCHARATE, AMPHETAMINE ASPARTATE MONOHYDRATE, DEXTROAMPHETAMINE SULFATE AND AMPHETAMINE SULFATE 2.5; 2.5; 2.5; 2.5 MG/1; MG/1; MG/1; MG/1
10 CAPSULE, EXTENDED RELEASE ORAL EVERY MORNING
Qty: 30 CAPSULE | Refills: 0 | Status: SHIPPED | OUTPATIENT
Start: 2019-04-15 | End: 2019-05-17 | Stop reason: SDUPTHER

## 2019-04-19 ENCOUNTER — HOSPITAL ENCOUNTER (OUTPATIENT)
Facility: HOSPITAL | Age: 24
Setting detail: HOSPITAL OUTPATIENT SURGERY
Discharge: HOME | End: 2019-04-19
Attending: INTERNAL MEDICINE | Admitting: INTERNAL MEDICINE

## 2019-04-19 ENCOUNTER — ANESTHESIA (OUTPATIENT)
Dept: GASTROENTEROLOGY | Facility: HOSPITAL | Age: 24
End: 2019-04-19

## 2019-04-19 ENCOUNTER — ANESTHESIA EVENT (OUTPATIENT)
Dept: GASTROENTEROLOGY | Facility: HOSPITAL | Age: 24
End: 2019-04-19

## 2019-04-19 VITALS
HEIGHT: 71 IN | HEART RATE: 82 BPM | OXYGEN SATURATION: 96 % | RESPIRATION RATE: 18 BRPM | DIASTOLIC BLOOD PRESSURE: 57 MMHG | BODY MASS INDEX: 22.56 KG/M2 | SYSTOLIC BLOOD PRESSURE: 104 MMHG | WEIGHT: 161.16 LBS | TEMPERATURE: 96.7 F

## 2019-04-19 DIAGNOSIS — R11.0 NAUSEA: ICD-10-CM

## 2019-04-19 DIAGNOSIS — K92.1 MELENA: ICD-10-CM

## 2019-04-19 DIAGNOSIS — K62.89 ANAL OR RECTAL PAIN: ICD-10-CM

## 2019-04-19 DIAGNOSIS — R63.4 WEIGHT LOSS, ABNORMAL: ICD-10-CM

## 2019-04-19 DIAGNOSIS — R19.7 DIARRHEA, UNSPECIFIED TYPE: ICD-10-CM

## 2019-04-19 DIAGNOSIS — R10.30 LOWER ABDOMINAL PAIN: ICD-10-CM

## 2019-04-19 PROCEDURE — 88305 TISSUE EXAM BY PATHOLOGIST: CPT | Performed by: PATHOLOGY

## 2019-04-19 PROCEDURE — 45380 COLONOSCOPY AND BIOPSY: CPT | Performed by: INTERNAL MEDICINE

## 2019-04-19 PROCEDURE — 25010000002 MIDAZOLAM PER 1 MG: Performed by: NURSE ANESTHETIST, CERTIFIED REGISTERED

## 2019-04-19 PROCEDURE — 25010000002 FENTANYL CITRATE (PF) 100 MCG/2ML SOLUTION: Performed by: NURSE ANESTHETIST, CERTIFIED REGISTERED

## 2019-04-19 PROCEDURE — 43239 EGD BIOPSY SINGLE/MULTIPLE: CPT | Performed by: INTERNAL MEDICINE

## 2019-04-19 PROCEDURE — 88305 TISSUE EXAM BY PATHOLOGIST: CPT | Performed by: INTERNAL MEDICINE

## 2019-04-19 PROCEDURE — 25010000002 PROPOFOL 10 MG/ML EMULSION: Performed by: NURSE ANESTHETIST, CERTIFIED REGISTERED

## 2019-04-19 RX ORDER — DEXTROSE AND SODIUM CHLORIDE 5; .45 G/100ML; G/100ML
30 INJECTION, SOLUTION INTRAVENOUS CONTINUOUS PRN
Status: DISCONTINUED | OUTPATIENT
Start: 2019-04-19 | End: 2019-04-19 | Stop reason: HOSPADM

## 2019-04-19 RX ORDER — LIDOCAINE HYDROCHLORIDE 10 MG/ML
INJECTION, SOLUTION INFILTRATION; PERINEURAL AS NEEDED
Status: DISCONTINUED | OUTPATIENT
Start: 2019-04-19 | End: 2019-04-19 | Stop reason: SURG

## 2019-04-19 RX ORDER — FENTANYL CITRATE 50 UG/ML
INJECTION, SOLUTION INTRAMUSCULAR; INTRAVENOUS AS NEEDED
Status: DISCONTINUED | OUTPATIENT
Start: 2019-04-19 | End: 2019-04-19 | Stop reason: SURG

## 2019-04-19 RX ORDER — PROPOFOL 10 MG/ML
VIAL (ML) INTRAVENOUS AS NEEDED
Status: DISCONTINUED | OUTPATIENT
Start: 2019-04-19 | End: 2019-04-19 | Stop reason: SURG

## 2019-04-19 RX ORDER — MIDAZOLAM HYDROCHLORIDE 1 MG/ML
INJECTION INTRAMUSCULAR; INTRAVENOUS AS NEEDED
Status: DISCONTINUED | OUTPATIENT
Start: 2019-04-19 | End: 2019-04-19 | Stop reason: SURG

## 2019-04-19 RX ADMIN — PROPOFOL 30 MG: 10 INJECTION, EMULSION INTRAVENOUS at 10:53

## 2019-04-19 RX ADMIN — PROPOFOL 30 MG: 10 INJECTION, EMULSION INTRAVENOUS at 10:57

## 2019-04-19 RX ADMIN — PROPOFOL 20 MG: 10 INJECTION, EMULSION INTRAVENOUS at 10:59

## 2019-04-19 RX ADMIN — PROPOFOL 20 MG: 10 INJECTION, EMULSION INTRAVENOUS at 10:50

## 2019-04-19 RX ADMIN — FENTANYL CITRATE 50 MCG: 50 INJECTION, SOLUTION INTRAMUSCULAR; INTRAVENOUS at 10:33

## 2019-04-19 RX ADMIN — LIDOCAINE HYDROCHLORIDE 100 MG: 10 INJECTION, SOLUTION INFILTRATION; PERINEURAL at 10:44

## 2019-04-19 RX ADMIN — DEXTROSE AND SODIUM CHLORIDE 30 ML/HR: 5; 450 INJECTION, SOLUTION INTRAVENOUS at 10:02

## 2019-04-19 RX ADMIN — PROPOFOL 20 MG: 10 INJECTION, EMULSION INTRAVENOUS at 10:47

## 2019-04-19 RX ADMIN — MIDAZOLAM HYDROCHLORIDE 2 MG: 2 INJECTION, SOLUTION INTRAMUSCULAR; INTRAVENOUS at 10:32

## 2019-04-19 RX ADMIN — FENTANYL CITRATE 50 MCG: 50 INJECTION, SOLUTION INTRAMUSCULAR; INTRAVENOUS at 10:44

## 2019-04-19 RX ADMIN — PROPOFOL 100 MG: 10 INJECTION, EMULSION INTRAVENOUS at 10:44

## 2019-04-19 NOTE — ANESTHESIA POSTPROCEDURE EVALUATION
Patient: Antione Villaseñor    Procedure Summary     Date:  04/19/19 Room / Location:  [facility] ENDOSCOPY 1 / [facility] ENDOSCOPY    Anesthesia Start:  1030 Anesthesia Stop:      Procedures:       ESOPHAGOGASTRODUODENOSCOPY (N/A )      COLONOSCOPY (N/A ) Diagnosis:       Melena      Nausea      Lower abdominal pain      Anal or rectal pain      Weight loss, abnormal      Diarrhea, unspecified type      (Melena [K92.1])      (Nausea [R11.0])      (Lower abdominal pain [R10.30])      (Anal or rectal pain [K62.89])      (Weight loss, abnormal [R63.4])      (Diarrhea, unspecified type [R19.7])    Surgeon:  Alonso Goodson MD Provider:  Duke Blunt CRNA    Anesthesia Type:  MAC ASA Status:  2          Anesthesia Type: MAC  Last vitals  BP   140/84 (04/19/19 0959)   Temp   98.1 °F (36.7 °C) (04/19/19 0959)   Pulse   86 (04/19/19 0959)   Resp   14 (04/19/19 0959)     SpO2   98 % (04/19/19 0959)     Post Anesthesia Care and Evaluation    Patient location during evaluation: bedside  Patient participation: waiting for patient participation  Level of consciousness: responsive to verbal stimuli  Pain management: adequate  Airway patency: patent  Anesthetic complications: No anesthetic complications  PONV Status: none  Cardiovascular status: acceptable  Respiratory status: acceptable  Hydration status: acceptable

## 2019-04-19 NOTE — NURSING NOTE
Pt states that he passed out 3 times at home this morning    Discussed with ben macedo and he is ok to continue

## 2019-04-19 NOTE — ANESTHESIA PREPROCEDURE EVALUATION
Anesthesia Evaluation     NPO Solid Status: > 8 hours  NPO Liquid Status: > 8 hours           Airway   Mallampati: II  TM distance: >3 FB  Neck ROM: full  no difficulty expected  Dental - normal exam     Pulmonary - normal exam   (+) recent URI,   Cardiovascular - normal exam    (+) valvular problems/murmurs,       Neuro/Psych  (+) numbness, psychiatric history,     GI/Hepatic/Renal/Endo      Musculoskeletal     (+) back pain,   Abdominal    Substance History      OB/GYN          Other                        Anesthesia Plan    ASA 2     MAC     intravenous induction   Anesthetic plan, all risks, benefits, and alternatives have been provided, discussed and informed consent has been obtained with: patient.

## 2019-04-22 LAB
LAB AP CASE REPORT: NORMAL
PATH REPORT.FINAL DX SPEC: NORMAL
PATH REPORT.GROSS SPEC: NORMAL

## 2019-04-29 ENCOUNTER — OFFICE VISIT (OUTPATIENT)
Dept: GASTROENTEROLOGY | Facility: CLINIC | Age: 24
End: 2019-04-29

## 2019-04-29 VITALS
HEART RATE: 91 BPM | SYSTOLIC BLOOD PRESSURE: 126 MMHG | HEIGHT: 70 IN | BODY MASS INDEX: 23.65 KG/M2 | DIASTOLIC BLOOD PRESSURE: 78 MMHG | WEIGHT: 165.2 LBS

## 2019-04-29 DIAGNOSIS — R10.10 PAIN OF UPPER ABDOMEN: ICD-10-CM

## 2019-04-29 DIAGNOSIS — K21.00 GASTROESOPHAGEAL REFLUX DISEASE WITH ESOPHAGITIS: Primary | ICD-10-CM

## 2019-04-29 DIAGNOSIS — R11.0 NAUSEA: ICD-10-CM

## 2019-04-29 PROCEDURE — 99214 OFFICE O/P EST MOD 30 MIN: CPT | Performed by: PHYSICIAN ASSISTANT

## 2019-04-29 RX ORDER — PANTOPRAZOLE SODIUM 40 MG/1
40 TABLET, DELAYED RELEASE ORAL DAILY
Qty: 30 TABLET | Refills: 3 | Status: SHIPPED | OUTPATIENT
Start: 2019-04-29 | End: 2019-08-16 | Stop reason: SDDI

## 2019-04-29 RX ORDER — NORTRIPTYLINE HYDROCHLORIDE 10 MG/1
10 CAPSULE ORAL NIGHTLY
Qty: 30 CAPSULE | Refills: 2 | Status: SHIPPED | OUTPATIENT
Start: 2019-04-29 | End: 2019-08-16 | Stop reason: SDDI

## 2019-05-02 NOTE — PROGRESS NOTES
I have reviewed the notes, assessments, and/or procedures performed by Dr. Taylor, I concur with her/his documentation and assessment and plan for Antione Villaseñor.       This document has been electronically signed by Brown Johnson MD on May 2, 2019 3:00 PM

## 2019-05-02 NOTE — PROGRESS NOTES
ID: Antione Villaseñor    CC:   Chief Complaint   Patient presents with   • ADHD     wants to talk about his med       Subjective:     HPI     Antione Villaseñor is a 23 y.o. male who presents for follow up of ADHD.  Patient states he gets stomach pains when he takes his immediate release adderal and would like to try extended release twice a day as once a day he feels the medication is wearing off to quickly.  He is having difficulty concentrating at work and his boss has noted this as well.  He states he sleeps from 5-8 hours nightly and this is not due to insomnia but due to erratic work schedule.     Preventative:  Over the past 2 weeks, have you felt down, depressed, or hopeless?No   Over the past 2 weeks, have you felt little interest or pleasure in doing things?No  Clinical depression screening refused by patient.Not Indicated     On osteoporosis therapy?No     Past Medical Hx:  Past Medical History:   Diagnosis Date   • ADHD (attention deficit hyperactivity disorder)    • Ankle joint pain    • Bipolar disorder (CMS/HCC)    • Burning feet syndrome    • Depressive disorder    • Dysuria     Dysuria - OVER ACTIVE BLADDER VS POLYURIA      • Encounter for general adult medical examination without abnormal findings    • Heart murmur    • Infertile male syndrome    • Intervertebral disc disorders with radiculopathy, lumbar region    • Low back pain    • Male hypogonadism     Male hypogonadism - not receiving testosterone replacement by urology      • Memory loss    • Pain in unspecified foot    • Palpitations        Past Surgical Hx:  Past Surgical History:   Procedure Laterality Date   • COLONOSCOPY N/A 4/19/2019    Procedure: COLONOSCOPY;  Surgeon: Alonso Goodson MD;  Location: [facility] ENDOSCOPY;  Service: Gastroenterology   • ENDOSCOPY N/A 4/19/2019    Procedure: ESOPHAGOGASTRODUODENOSCOPY;  Surgeon: Alonso Goodson MD;  Location: [facility] ENDOSCOPY;  Service: Gastroenterology   • INJECTION OF MEDICATION       Kenalog (1)      10/05/2015       • UPPER GASTROINTESTINAL ENDOSCOPY  04/19/2019       Health Maintenance:  Health Maintenance   Topic Date Due   • TDAP/TD VACCINES (2 - Td) 01/01/2016   • ANNUAL PHYSICAL  06/08/2019   • INFLUENZA VACCINE  08/01/2019       Current Meds:    Current Outpatient Medications:   •  amphetamine-dextroamphetamine XR (ADDERALL XR) 20 MG 24 hr capsule, Take 1 capsule by mouth Every Morning, Disp: 30 capsule, Rfl: 0  •  amphetamine-dextroamphetamine XR (ADDERALL XR) 10 MG 24 hr capsule, Take 1 capsule by mouth Every Morning Take at noon daily (Patient taking differently: Take 10 mg by mouth Every Morning  ), Disp: 30 capsule, Rfl: 0  •  nortriptyline (PAMELOR) 10 MG capsule, Take 1 capsule by mouth Every Night., Disp: 30 capsule, Rfl: 2  •  pantoprazole (PROTONIX) 40 MG EC tablet, Take 1 tablet by mouth Daily., Disp: 30 tablet, Rfl: 3    Allergies:  Patient has no known allergies.    Family Hx:  Family History   Problem Relation Age of Onset   • Asthma Other    • Diabetes Other    • Heart disease Other    • Hypertension Other    • Lung disease Other    • Coronary artery disease Father    • Hypertension Father    • Liver disease Mother         Social History:  Social History     Socioeconomic History   • Marital status:      Spouse name: Not on file   • Number of children: Not on file   • Years of education: Not on file   • Highest education level: Not on file   Tobacco Use   • Smoking status: Former Smoker   • Smokeless tobacco: Former User   Substance and Sexual Activity   • Alcohol use: No   • Drug use: No   • Sexual activity: Yes     Partners: Female       Review of Systems   Constitutional: Negative for activity change, appetite change, fatigue and fever.   HENT: Negative for ear pain and sore throat.    Eyes: Negative for pain and visual disturbance.   Respiratory: Negative for cough and shortness of breath.    Cardiovascular: Negative for chest pain and palpitations.  "  Gastrointestinal: Negative for abdominal pain and nausea.   Endocrine: Negative for cold intolerance and heat intolerance.   Genitourinary: Negative for difficulty urinating and dysuria.   Musculoskeletal: Negative for arthralgias and gait problem.   Skin: Negative for color change and rash.   Neurological: Negative for dizziness, weakness and headaches.   Hematological: Negative for adenopathy. Does not bruise/bleed easily.   Psychiatric/Behavioral: Negative for agitation, confusion and sleep disturbance.           Objective:     /80   Pulse 100   Ht 177.8 cm (70\")   Wt 75.3 kg (166 lb)   SpO2 99%   BMI 23.82 kg/m²     Physical Exam   Constitutional: He is oriented to person, place, and time. He appears well-developed and well-nourished. No distress.   HENT:   Head: Normocephalic and atraumatic.   Nose: Nose normal.   Eyes: Conjunctivae are normal. Right eye exhibits no discharge. Left eye exhibits no discharge.   Neck: Neck supple.   Cardiovascular: Normal rate, regular rhythm and normal heart sounds. Exam reveals no gallop and no friction rub.   No murmur heard.  Pulmonary/Chest: Effort normal and breath sounds normal. No accessory muscle usage. No respiratory distress. He has no wheezes. He has no rales. He exhibits no tenderness.   Abdominal: Soft. Bowel sounds are normal. He exhibits no distension. There is no tenderness.   Musculoskeletal: He exhibits no edema or deformity.   Neurological: He is alert and oriented to person, place, and time.   Skin: Skin is warm and dry. No rash noted. He is not diaphoretic. No erythema. No pallor.   Psychiatric: He has a normal mood and affect. His behavior is normal.          Assessment/Plan:     Antione was seen today for adhd.    Diagnoses and all orders for this visit:    ADHD, predominantly inattentive type  -     amphetamine-dextroamphetamine XR (ADDERALL XR) 20 MG 24 hr capsule; Take 1 capsule by mouth Every Morning  -     amphetamine-dextroamphetamine XR " (ADDERALL XR) 10 MG 24 hr capsule; Take 1 capsule by mouth Every Morning Take at noon daily (Patient taking differently: Take 10 mg by mouth Every Morning  )      lorne reviewed and appropriate # [id number]       Follow-up:     Return in about 4 weeks (around 5/13/2019) for Recheck ADHD .      Goals        Patient Stated    • Patient reports that he would like to have improvment of his memory.  (pt-stated)      Barriers to goal: None          Patient's Body mass index is 23.82 kg/m². BMI is within normal parameters. No follow-up required..      Health Maintenance   Topic Date Due   • TDAP/TD VACCINES (2 - Td) 01/01/2016   • ANNUAL PHYSICAL  06/08/2019   • INFLUENZA VACCINE  08/01/2019       eat more fruits and vegetables, increase water intake, reduce screen time, cut out extra servings, reduce fast food intake, keep TV off during meals and have 3 meals a day    RISK SCORE: 2          This document has been electronically signed by Jagruti Taylor MD on May 2, 2019 2:52 PM

## 2019-05-16 ENCOUNTER — TELEPHONE (OUTPATIENT)
Dept: FAMILY MEDICINE CLINIC | Facility: CLINIC | Age: 24
End: 2019-05-16

## 2019-05-16 NOTE — TELEPHONE ENCOUNTER
Patient is needing a refill on his  amphetamine-dextroamphetamine XR (ADDERALL XR) 10 MG 24 hr capsule  amphetamine-dextroamphetamine XR (ADDERALL XR) 20 MG 24 hr capsule.    Patient uses [facility] .  Thanks,  Katia

## 2019-05-17 DIAGNOSIS — F90.0 ADHD, PREDOMINANTLY INATTENTIVE TYPE: ICD-10-CM

## 2019-05-17 RX ORDER — DEXTROAMPHETAMINE SACCHARATE, AMPHETAMINE ASPARTATE MONOHYDRATE, DEXTROAMPHETAMINE SULFATE AND AMPHETAMINE SULFATE 5; 5; 5; 5 MG/1; MG/1; MG/1; MG/1
20 CAPSULE, EXTENDED RELEASE ORAL EVERY MORNING
Qty: 30 CAPSULE | Refills: 0 | Status: SHIPPED | OUTPATIENT
Start: 2019-05-17 | End: 2019-06-10

## 2019-05-17 RX ORDER — DEXTROAMPHETAMINE SACCHARATE, AMPHETAMINE ASPARTATE MONOHYDRATE, DEXTROAMPHETAMINE SULFATE AND AMPHETAMINE SULFATE 2.5; 2.5; 2.5; 2.5 MG/1; MG/1; MG/1; MG/1
10 CAPSULE, EXTENDED RELEASE ORAL EVERY MORNING
Qty: 30 CAPSULE | Refills: 0 | Status: SHIPPED | OUTPATIENT
Start: 2019-05-17 | End: 2019-06-10

## 2019-06-10 ENCOUNTER — OFFICE VISIT (OUTPATIENT)
Dept: FAMILY MEDICINE CLINIC | Facility: CLINIC | Age: 24
End: 2019-06-10

## 2019-06-10 ENCOUNTER — APPOINTMENT (OUTPATIENT)
Dept: LAB | Facility: HOSPITAL | Age: 24
End: 2019-06-10

## 2019-06-10 VITALS
HEIGHT: 70 IN | BODY MASS INDEX: 24.05 KG/M2 | WEIGHT: 168 LBS | HEART RATE: 90 BPM | OXYGEN SATURATION: 99 % | SYSTOLIC BLOOD PRESSURE: 118 MMHG | DIASTOLIC BLOOD PRESSURE: 70 MMHG

## 2019-06-10 DIAGNOSIS — Z51.81 MEDICATION MONITORING ENCOUNTER: Primary | ICD-10-CM

## 2019-06-10 DIAGNOSIS — F90.0 ATTENTION DEFICIT HYPERACTIVITY DISORDER (ADHD), PREDOMINANTLY INATTENTIVE TYPE: ICD-10-CM

## 2019-06-10 LAB
AMPHET+METHAMPHET UR QL: POSITIVE
BARBITURATES UR QL SCN: NEGATIVE
BENZODIAZ UR QL SCN: NEGATIVE
CANNABINOIDS SERPL QL: NEGATIVE
COCAINE UR QL: NEGATIVE
METHADONE UR QL SCN: NEGATIVE
OPIATES UR QL: NEGATIVE
OXYCODONE UR QL SCN: NEGATIVE

## 2019-06-10 PROCEDURE — 80307 DRUG TEST PRSMV CHEM ANLYZR: CPT | Performed by: FAMILY MEDICINE

## 2019-06-10 PROCEDURE — 99213 OFFICE O/P EST LOW 20 MIN: CPT | Performed by: FAMILY MEDICINE

## 2019-06-10 RX ORDER — DEXTROAMPHETAMINE SACCHARATE, AMPHETAMINE ASPARTATE MONOHYDRATE, DEXTROAMPHETAMINE SULFATE AND AMPHETAMINE SULFATE 7.5; 7.5; 7.5; 7.5 MG/1; MG/1; MG/1; MG/1
30 CAPSULE, EXTENDED RELEASE ORAL EVERY MORNING
Qty: 30 CAPSULE | Refills: 0 | Status: SHIPPED | OUTPATIENT
Start: 2019-06-10 | End: 2019-08-07 | Stop reason: SDUPTHER

## 2019-06-17 NOTE — PROGRESS NOTES
ID: Antione Villaseñor    CC:   Chief Complaint   Patient presents with   • ADHD       Subjective:     HPI     Antione Villaseñor is a 23 y.o. male who presents for follow-up of ADHD.  At last visit patient was switched to long-acting Adderall twice daily instead of short acting and long-acting together.  Patient states this is much better on his stomach he has no longer having any stomach pain as he was previously with the short acting.  Patient states that he is focusing well at work and has an upcoming week of training on June 29.  Patient requests that instead of taking to long-acting he simply take a larger pill in the morning.  She feels this will fit his work schedule better.  [facility] appropriate #[id number].    Preventative:  Over the past 2 weeks, have you felt down, depressed, or hopeless?No   Over the past 2 weeks, have you felt little interest or pleasure in doing things?No  Clinical depression screening refused by patient.Not Indicated     On osteoporosis therapy?Not Indicated     Past Medical Hx:  Past Medical History:   Diagnosis Date   • ADHD (attention deficit hyperactivity disorder)    • Ankle joint pain    • Bipolar disorder (CMS/HCC)    • Burning feet syndrome    • Depressive disorder    • Dysuria     Dysuria - OVER ACTIVE BLADDER VS POLYURIA      • Encounter for general adult medical examination without abnormal findings    • Heart murmur    • Infertile male syndrome    • Intervertebral disc disorders with radiculopathy, lumbar region    • Low back pain    • Male hypogonadism     Male hypogonadism - not receiving testosterone replacement by urology      • Memory loss    • Pain in unspecified foot    • Palpitations        Past Surgical Hx:  Past Surgical History:   Procedure Laterality Date   • COLONOSCOPY N/A 4/19/2019    Procedure: COLONOSCOPY;  Surgeon: Alonso Goodson MD;  Location: [facility] ENDOSCOPY;  Service: Gastroenterology   • ENDOSCOPY N/A 4/19/2019    Procedure:  ESOPHAGOGASTRODUODENOSCOPY;  Surgeon: Alonso Goodson MD;  Location: [facility] ENDOSCOPY;  Service: Gastroenterology   • INJECTION OF MEDICATION      Kenalog (1)      10/05/2015       • UPPER GASTROINTESTINAL ENDOSCOPY  04/19/2019       Health Maintenance:  Health Maintenance   Topic Date Due   • TDAP/TD VACCINES (2 - Td) 01/01/2016   • ANNUAL PHYSICAL  06/08/2019   • INFLUENZA VACCINE  08/01/2019       Current Meds:    Current Outpatient Medications:   •  nortriptyline (PAMELOR) 10 MG capsule, Take 1 capsule by mouth Every Night., Disp: 30 capsule, Rfl: 2  •  pantoprazole (PROTONIX) 40 MG EC tablet, Take 1 tablet by mouth Daily., Disp: 30 tablet, Rfl: 3  •  amphetamine-dextroamphetamine XR (ADDERALL XR) 30 MG 24 hr capsule, Take 1 capsule by mouth Every Morning, Disp: 30 capsule, Rfl: 0    Allergies:  Patient has no known allergies.    Family Hx:  Family History   Problem Relation Age of Onset   • Asthma Other    • Diabetes Other    • Heart disease Other    • Hypertension Other    • Lung disease Other    • Coronary artery disease Father    • Hypertension Father    • Liver disease Mother         Social History:  Social History     Socioeconomic History   • Marital status:      Spouse name: Not on file   • Number of children: Not on file   • Years of education: Not on file   • Highest education level: Not on file   Tobacco Use   • Smoking status: Former Smoker   • Smokeless tobacco: Former User   Substance and Sexual Activity   • Alcohol use: No   • Drug use: No   • Sexual activity: Yes     Partners: Female       Review of Systems   Constitutional: Negative for activity change, appetite change, fatigue and fever.   HENT: Negative for ear pain and sore throat.    Eyes: Negative for pain and visual disturbance.   Respiratory: Negative for cough and shortness of breath.    Cardiovascular: Negative for chest pain and palpitations.   Gastrointestinal: Negative for abdominal pain and nausea.   Endocrine: Negative for  "cold intolerance and heat intolerance.   Genitourinary: Negative for difficulty urinating and dysuria.   Musculoskeletal: Negative for arthralgias and gait problem.   Skin: Negative for color change and rash.   Neurological: Negative for dizziness, weakness and headaches.   Hematological: Negative for adenopathy. Does not bruise/bleed easily.   Psychiatric/Behavioral: Negative for agitation, confusion and sleep disturbance.           Objective:     /70   Pulse 90   Ht 177.8 cm (70\")   Wt 76.2 kg (168 lb)   SpO2 99%   BMI 24.11 kg/m²     Physical Exam   Constitutional: He is oriented to person, place, and time. He appears well-developed and well-nourished. No distress.   HENT:   Head: Normocephalic and atraumatic.   Nose: Nose normal.   Eyes: Conjunctivae are normal. Right eye exhibits no discharge. Left eye exhibits no discharge.   Neck: Neck supple.   Cardiovascular: Normal rate, regular rhythm and normal heart sounds. Exam reveals no gallop and no friction rub.   No murmur heard.  Pulmonary/Chest: Effort normal and breath sounds normal. No accessory muscle usage. No respiratory distress. He has no wheezes. He has no rales. He exhibits no tenderness.   Abdominal: Soft. Bowel sounds are normal. He exhibits no distension. There is no tenderness.   Musculoskeletal: He exhibits no edema or deformity.   Neurological: He is alert and oriented to person, place, and time.   Skin: Skin is warm and dry. No rash noted. He is not diaphoretic. No erythema. No pallor.   Psychiatric: He has a normal mood and affect. His behavior is normal.       Assessment/Plan:     Antione was seen today for adhd.    Diagnoses and all orders for this visit:    Medication monitoring encounter  -     Urine Drug Screen - Urine, Clean Catch; Future  -     Urine Drug Screen - Urine, Clean Catch    Attention deficit hyperactivity disorder (ADHD), predominantly inattentive type  -     amphetamine-dextroamphetamine XR (ADDERALL XR) 30 MG 24 hr " capsule; Take 1 capsule by mouth Every Morning  -     Urine Drug Screen - Urine, Clean Catch; Future          Follow-up:     Return in about 4 weeks (around 7/8/2019) for call sooner if needed .      Goals        Patient Stated    • Patient reports that he would like to have improvment of his memory.  (pt-stated)      Barriers to goal: None          Patient's Body mass index is 24.11 kg/m². BMI is within normal parameters. No follow-up required..      Health Maintenance   Topic Date Due   • TDAP/TD VACCINES (2 - Td) 01/01/2016   • ANNUAL PHYSICAL  06/08/2019   • INFLUENZA VACCINE  08/01/2019       increase physical activity, reduce screen time, reduce fast food intake, plan meals and have 3 meals a day    RISK SCORE: 3          This document has been electronically signed by Jagruti Taylor MD on June 17, 2019 9:27 AM

## 2019-06-18 NOTE — PROGRESS NOTES
I have reviewed the notes, assessments, and/or procedures performed by dr Taylor, I concur with her/his documentation and assessment and plan for Antione Villaseñor.                This document has been electronically signed by Juvenal Rodgers MD on June 18, 2019 10:30 AM

## 2019-07-08 ENCOUNTER — OFFICE VISIT (OUTPATIENT)
Dept: FAMILY MEDICINE CLINIC | Facility: CLINIC | Age: 24
End: 2019-07-08

## 2019-07-08 VITALS
HEIGHT: 70 IN | DIASTOLIC BLOOD PRESSURE: 70 MMHG | BODY MASS INDEX: 24.05 KG/M2 | HEART RATE: 88 BPM | SYSTOLIC BLOOD PRESSURE: 120 MMHG | WEIGHT: 168 LBS | OXYGEN SATURATION: 98 %

## 2019-07-08 DIAGNOSIS — F90.2 ATTENTION DEFICIT HYPERACTIVITY DISORDER (ADHD), COMBINED TYPE: Primary | ICD-10-CM

## 2019-07-08 PROCEDURE — 99213 OFFICE O/P EST LOW 20 MIN: CPT | Performed by: STUDENT IN AN ORGANIZED HEALTH CARE EDUCATION/TRAINING PROGRAM

## 2019-07-08 RX ORDER — DEXTROAMPHETAMINE SACCHARATE, AMPHETAMINE ASPARTATE MONOHYDRATE, DEXTROAMPHETAMINE SULFATE AND AMPHETAMINE SULFATE 5; 5; 5; 5 MG/1; MG/1; MG/1; MG/1
20 CAPSULE, EXTENDED RELEASE ORAL 2 TIMES DAILY
Qty: 60 CAPSULE | Refills: 0 | Status: SHIPPED | OUTPATIENT
Start: 2019-07-08 | End: 2019-08-07 | Stop reason: SDUPTHER

## 2019-07-08 NOTE — PROGRESS NOTES
ID: Antione Villaseñor    CC:   Chief Complaint   Patient presents with   • ADHD       Subjective:     HPI     Antione Villaseñor is a 23 y.o. male who presents for adhd refill. Patient has been on adderall immediate and extended release for the past several years. Patient was recently switched from twice a day adderall to now once a day. Patient states its not doing that well. He feels like he doesn't do well at the end of the day and would rather take something twice a day. Patient otherwise is doing well. Has no complaints. He is eating and sleeping well.     Preventative:  Over the past 2 weeks, have you felt down, depressed, or hopeless? no  Over the past 2 weeks, have you felt little interest or pleasure in doing things? no  Clinical depression screening refused by patient no    On osteoporosis therapy? no    Past Medical Hx:  Past Medical History:   Diagnosis Date   • ADHD (attention deficit hyperactivity disorder)    • Ankle joint pain    • Bipolar disorder (CMS/HCC)    • Burning feet syndrome    • Depressive disorder    • Dysuria     Dysuria - OVER ACTIVE BLADDER VS POLYURIA      • Encounter for general adult medical examination without abnormal findings    • Heart murmur    • Infertile male syndrome    • Intervertebral disc disorders with radiculopathy, lumbar region    • Low back pain    • Male hypogonadism     Male hypogonadism - not receiving testosterone replacement by urology      • Memory loss    • Pain in unspecified foot    • Palpitations        Past Surgical Hx:  Past Surgical History:   Procedure Laterality Date   • COLONOSCOPY N/A 4/19/2019    Procedure: COLONOSCOPY;  Surgeon: Alonso Goodson MD;  Location: [facility] ENDOSCOPY;  Service: Gastroenterology   • ENDOSCOPY N/A 4/19/2019    Procedure: ESOPHAGOGASTRODUODENOSCOPY;  Surgeon: Alonso Goodson MD;  Location: [facility] ENDOSCOPY;  Service: Gastroenterology   • INJECTION OF MEDICATION      Kenalog (1)      10/05/2015       • UPPER  GASTROINTESTINAL ENDOSCOPY  04/19/2019       Health Maintenance:  Health Maintenance   Topic Date Due   • TDAP/TD VACCINES (2 - Td) 01/01/2016   • ANNUAL PHYSICAL  06/08/2019   • INFLUENZA VACCINE  08/01/2019       Current Meds:    Current Outpatient Medications:   •  amphetamine-dextroamphetamine XR (ADDERALL XR) 20 MG 24 hr capsule, Take 1 capsule by mouth 2 (Two) Times a Day, Disp: 60 capsule, Rfl: 0  •  amphetamine-dextroamphetamine XR (ADDERALL XR) 30 MG 24 hr capsule, Take 1 capsule by mouth Every Morning, Disp: 30 capsule, Rfl: 0  •  nortriptyline (PAMELOR) 10 MG capsule, Take 1 capsule by mouth Every Night., Disp: 30 capsule, Rfl: 2  •  pantoprazole (PROTONIX) 40 MG EC tablet, Take 1 tablet by mouth Daily., Disp: 30 tablet, Rfl: 3    Allergies:  Patient has no known allergies.    Family Hx:  Family History   Problem Relation Age of Onset   • Asthma Other    • Diabetes Other    • Heart disease Other    • Hypertension Other    • Lung disease Other    • Coronary artery disease Father    • Hypertension Father    • Liver disease Mother         Social History:  Social History     Socioeconomic History   • Marital status:      Spouse name: Not on file   • Number of children: Not on file   • Years of education: Not on file   • Highest education level: Not on file   Tobacco Use   • Smoking status: Former Smoker   • Smokeless tobacco: Former User   Substance and Sexual Activity   • Alcohol use: No   • Drug use: No   • Sexual activity: Yes     Partners: Female       Review of Systems   Constitutional: Negative for activity change, appetite change, chills, fatigue and fever.   HENT: Negative for drooling, ear discharge, ear pain, facial swelling, hearing loss, mouth sores, rhinorrhea and sinus pain.    Eyes: Negative for pain, redness and itching.   Respiratory: Negative for cough, choking, chest tightness, shortness of breath and stridor.    Cardiovascular: Negative for chest pain, palpitations and leg swelling.  "  Gastrointestinal: Negative for abdominal distention, abdominal pain, anal bleeding, blood in stool, constipation, diarrhea and nausea.   Endocrine: Negative for heat intolerance, polydipsia and polyphagia.   Genitourinary: Negative for dysuria, flank pain, frequency and genital sores.   Musculoskeletal: Negative for back pain, gait problem, joint swelling and myalgias.   Skin: Negative for pallor and rash.   Neurological: Negative for seizures, facial asymmetry, speech difficulty, light-headedness, numbness and headaches.   Hematological: Negative for adenopathy. Does not bruise/bleed easily.   Psychiatric/Behavioral: Negative for confusion, decreased concentration, dysphoric mood and hallucinations. The patient is not nervous/anxious and is not hyperactive.            Objective:     /70   Pulse 88   Ht 177.8 cm (70\")   Wt 76.2 kg (168 lb)   SpO2 98%   BMI 24.11 kg/m²     Physical Exam   Constitutional: He is oriented to person, place, and time. He appears well-developed and well-nourished.   HENT:   Head: Normocephalic and atraumatic.   Right Ear: External ear normal.   Left Ear: External ear normal.   Nose: Nose normal.   Mouth/Throat: Oropharynx is clear and moist.   Eyes: Conjunctivae and EOM are normal. Pupils are equal, round, and reactive to light.   Neck: Normal range of motion. Neck supple.   Cardiovascular: Normal rate, regular rhythm, normal heart sounds and intact distal pulses.   Pulmonary/Chest: Effort normal and breath sounds normal.   Abdominal: Soft. Bowel sounds are normal.   Musculoskeletal: Normal range of motion.   Neurological: He is alert and oriented to person, place, and time.   Skin: Skin is warm and dry.   Psychiatric: He has a normal mood and affect. His behavior is normal. Judgment and thought content normal.              Assessment/Plan:     Antione was seen today for adhd.    Diagnoses and all orders for this visit:    Attention deficit hyperactivity disorder (ADHD), " combined type    Other orders  -     amphetamine-dextroamphetamine XR (ADDERALL XR) 20 MG 24 hr capsule; Take 1 capsule by mouth 2 (Two) Times a Day          Follow-up:     Return in about 3 months (around 10/8/2019).      Goals:   Goals     • Patient reports that he would like to have improvment of his memory.  (pt-stated)      Barriers to goal: None          Barriers to goals: none     Health Maintenance   Topic Date Due   • TDAP/TD VACCINES (2 - Td) 01/01/2016   • ANNUAL PHYSICAL  06/08/2019   • INFLUENZA VACCINE  08/01/2019       Tobacco: nonsmoker  Alcohol: does not drink  Lifestyle: Patient's Body mass index is 24.11 kg/m². BMI is within normal parameters. No follow-up required..   decrease soda or juice intake, increase water intake, reduce screen time and cut out extra servings    RISK SCORE: 2       Anna Quick M.D. PGY1  [facility]  [address] [place] [address]  Office: [phone]    This document has been electronically signed by Anna Quick MD on July 8, 2019 2:42 PM            This document has been electronically signed by Anna Quick MD on July 8, 2019 2:42 PM

## 2019-07-08 NOTE — PROGRESS NOTES
I have reviewed the notes, assessments, and/or procedures performed by Anna Quick MD, I concur with her/his documentation and assessment and plan for Antione Villaseñor.                This document has been electronically signed by Juvenal Rodgers MD on July 8, 2019 3:10 PM

## 2019-08-05 ENCOUNTER — TELEPHONE (OUTPATIENT)
Dept: FAMILY MEDICINE CLINIC | Facility: CLINIC | Age: 24
End: 2019-08-05

## 2019-08-05 DIAGNOSIS — F90.0 ATTENTION DEFICIT HYPERACTIVITY DISORDER (ADHD), PREDOMINANTLY INATTENTIVE TYPE: ICD-10-CM

## 2019-08-05 RX ORDER — DEXTROAMPHETAMINE SACCHARATE, AMPHETAMINE ASPARTATE MONOHYDRATE, DEXTROAMPHETAMINE SULFATE AND AMPHETAMINE SULFATE 7.5; 7.5; 7.5; 7.5 MG/1; MG/1; MG/1; MG/1
30 CAPSULE, EXTENDED RELEASE ORAL EVERY MORNING
Qty: 30 CAPSULE | Refills: 0 | Status: CANCELLED | OUTPATIENT
Start: 2019-08-05

## 2019-08-05 RX ORDER — DEXTROAMPHETAMINE SACCHARATE, AMPHETAMINE ASPARTATE MONOHYDRATE, DEXTROAMPHETAMINE SULFATE AND AMPHETAMINE SULFATE 5; 5; 5; 5 MG/1; MG/1; MG/1; MG/1
20 CAPSULE, EXTENDED RELEASE ORAL 2 TIMES DAILY
Qty: 60 CAPSULE | Refills: 0 | Status: CANCELLED | OUTPATIENT
Start: 2019-08-05

## 2019-08-05 NOTE — TELEPHONE ENCOUNTER
Pt called and is needing a refill on his amphetamine-dextroamphetamine XR (ADDERALL XR) 20 MG 24 hr capsule and amphetamine-dextroamphetamine XR (ADDERALL XR) 30 MG 24 hr capsule and would like a call when ready to be picked up.      Thank you,      Yesenia

## 2019-08-06 ENCOUNTER — TELEPHONE (OUTPATIENT)
Dept: FAMILY MEDICINE CLINIC | Facility: CLINIC | Age: 24
End: 2019-08-06

## 2019-08-06 NOTE — TELEPHONE ENCOUNTER
Pt called and was checking on the status of his medication amphetamine-dextroamphetamine XR (ADDERALL XR) 20 MG 24 hr capsule and amphetamine-dextroamphetamine XR (ADDERALL XR) 30 MG 24 hr capsule and would like a call when ready to be picked up.       Thank you,      Yesenia

## 2019-08-07 ENCOUNTER — TELEPHONE (OUTPATIENT)
Dept: FAMILY MEDICINE CLINIC | Facility: CLINIC | Age: 24
End: 2019-08-07

## 2019-08-07 DIAGNOSIS — F90.0 ATTENTION DEFICIT HYPERACTIVITY DISORDER (ADHD), PREDOMINANTLY INATTENTIVE TYPE: ICD-10-CM

## 2019-08-07 RX ORDER — DEXTROAMPHETAMINE SACCHARATE, AMPHETAMINE ASPARTATE MONOHYDRATE, DEXTROAMPHETAMINE SULFATE AND AMPHETAMINE SULFATE 5; 5; 5; 5 MG/1; MG/1; MG/1; MG/1
20 CAPSULE, EXTENDED RELEASE ORAL 2 TIMES DAILY
Qty: 60 CAPSULE | Refills: 0 | Status: SHIPPED | OUTPATIENT
Start: 2019-08-07 | End: 2019-09-10 | Stop reason: SDUPTHER

## 2019-08-07 RX ORDER — DEXTROAMPHETAMINE SACCHARATE, AMPHETAMINE ASPARTATE MONOHYDRATE, DEXTROAMPHETAMINE SULFATE AND AMPHETAMINE SULFATE 7.5; 7.5; 7.5; 7.5 MG/1; MG/1; MG/1; MG/1
30 CAPSULE, EXTENDED RELEASE ORAL EVERY MORNING
Qty: 30 CAPSULE | Refills: 0 | Status: SHIPPED | OUTPATIENT
Start: 2019-08-07 | End: 2019-08-16 | Stop reason: SDDI

## 2019-08-07 NOTE — TELEPHONE ENCOUNTER
I'm going to send this to Dr Rodgers and tell the reason why and see if he could print scripts for patient

## 2019-08-07 NOTE — TELEPHONE ENCOUNTER
Pt called and is really upset because there have been several messages sent back regarding his medication amphetamine-dextroamphetamine XR (ADDERALL XR) 20 MG 24 hr capsule and his amphetamine-dextroamphetamine XR (ADDERALL XR) 30 MG 24 hr capsule. He really needs these done today. He is out and he said he will be coming up here.      Thank you,      Yesenia

## 2019-08-07 NOTE — TELEPHONE ENCOUNTER
Patient called again this morning upset because no one will give him an answer as to why he can no get his medication refill. Patient said that he was told twice that he would get a call back and no one has called him, patient said that he was also told that the information was sent to the supervisor and that it was being taken care of yet she still got an email for denial. Patient said that he also tried to contact his provider via [employer] and could not do it. Patient would like a call back about his refill when possible.    [phone]    Thanks,  Katia

## 2019-09-09 ENCOUNTER — TELEPHONE (OUTPATIENT)
Dept: FAMILY MEDICINE CLINIC | Facility: CLINIC | Age: 24
End: 2019-09-09

## 2019-09-09 NOTE — TELEPHONE ENCOUNTER
Pt called and is needing a refill on his amphetamine-dextroamphetamine XR (ADDERALL XR) 20 MG 24 hr capsule and would like a call when ready to be picked up please.      Thank you,      Yesenia

## 2019-09-10 ENCOUNTER — TELEPHONE (OUTPATIENT)
Dept: FAMILY MEDICINE CLINIC | Facility: CLINIC | Age: 24
End: 2019-09-10

## 2019-09-10 RX ORDER — DEXTROAMPHETAMINE SACCHARATE, AMPHETAMINE ASPARTATE MONOHYDRATE, DEXTROAMPHETAMINE SULFATE AND AMPHETAMINE SULFATE 5; 5; 5; 5 MG/1; MG/1; MG/1; MG/1
20 CAPSULE, EXTENDED RELEASE ORAL 2 TIMES DAILY
Qty: 60 CAPSULE | Refills: 0 | Status: SHIPPED | OUTPATIENT
Start: 2019-09-10 | End: 2019-10-30 | Stop reason: SDUPTHER

## 2019-09-10 NOTE — TELEPHONE ENCOUNTER
Pt called and was checking on the status of his medication amphetamine-dextroamphetamine XR (ADDERALL XR) 20 MG 24 hr capsule. I talked to Dr. Anna Quick and she said she would check into it. I told patient that we would call him back when it is ready.      Thank you,      Yesenia

## 2019-10-30 ENCOUNTER — APPOINTMENT (OUTPATIENT)
Dept: LAB | Facility: HOSPITAL | Age: 24
End: 2019-10-30

## 2019-10-30 ENCOUNTER — OFFICE VISIT (OUTPATIENT)
Dept: FAMILY MEDICINE CLINIC | Facility: CLINIC | Age: 24
End: 2019-10-30

## 2019-10-30 VITALS
SYSTOLIC BLOOD PRESSURE: 134 MMHG | BODY MASS INDEX: 26.71 KG/M2 | HEIGHT: 70 IN | DIASTOLIC BLOOD PRESSURE: 80 MMHG | OXYGEN SATURATION: 99 % | TEMPERATURE: 97.5 F | WEIGHT: 186.6 LBS | HEART RATE: 83 BPM

## 2019-10-30 DIAGNOSIS — Z79.899 LONG TERM USE OF DRUG: Primary | ICD-10-CM

## 2019-10-30 DIAGNOSIS — F90.0 ATTENTION DEFICIT HYPERACTIVITY DISORDER (ADHD), PREDOMINANTLY INATTENTIVE TYPE: ICD-10-CM

## 2019-10-30 LAB
AMPHET+METHAMPHET UR QL: NEGATIVE
AMPHETAMINES UR QL: NEGATIVE
BARBITURATES UR QL SCN: NEGATIVE
BENZODIAZ UR QL SCN: NEGATIVE
BUPRENORPHINE SERPL-MCNC: NEGATIVE NG/ML
CANNABINOIDS SERPL QL: NEGATIVE
COCAINE UR QL: NEGATIVE
METHADONE UR QL SCN: NEGATIVE
OPIATES UR QL: NEGATIVE
OXYCODONE UR QL SCN: NEGATIVE
PCP UR QL SCN: NEGATIVE
PROPOXYPH UR QL: NEGATIVE
TRICYCLICS UR QL SCN: NEGATIVE

## 2019-10-30 PROCEDURE — 80307 DRUG TEST PRSMV CHEM ANLYZR: CPT | Performed by: STUDENT IN AN ORGANIZED HEALTH CARE EDUCATION/TRAINING PROGRAM

## 2019-10-30 PROCEDURE — 99213 OFFICE O/P EST LOW 20 MIN: CPT | Performed by: STUDENT IN AN ORGANIZED HEALTH CARE EDUCATION/TRAINING PROGRAM

## 2019-10-30 RX ORDER — DEXTROAMPHETAMINE SACCHARATE, AMPHETAMINE ASPARTATE MONOHYDRATE, DEXTROAMPHETAMINE SULFATE AND AMPHETAMINE SULFATE 5; 5; 5; 5 MG/1; MG/1; MG/1; MG/1
20 CAPSULE, EXTENDED RELEASE ORAL 2 TIMES DAILY
Qty: 60 CAPSULE | Refills: 0 | Status: SHIPPED | OUTPATIENT
Start: 2019-10-30 | End: 2019-12-03 | Stop reason: SDUPTHER

## 2019-11-04 NOTE — PROGRESS NOTES
ID: Antione Villaseñor    CC:   Chief Complaint   Patient presents with   • ADHD       Subjective:     HPI     Antione Villaseñor is a 24 y.o. male who presents for adhd refill. Patient is on adderall xr 20 mg twice a day. Patient is eating well, is able to focus on his job. Patient is doing well. Has no complaints. He is eating and sleeping well.     Preventative:  Over the past 2 weeks, have you felt down, depressed, or hopeless? no  Over the past 2 weeks, have you felt little interest or pleasure in doing things? no  Clinical depression screening refused by patient no    On osteoporosis therapy? no    Past Medical Hx:  Past Medical History:   Diagnosis Date   • ADHD (attention deficit hyperactivity disorder)    • Ankle joint pain    • Bipolar disorder (CMS/HCC)    • Burning feet syndrome    • Depressive disorder    • Dysuria     Dysuria - OVER ACTIVE BLADDER VS POLYURIA      • Encounter for general adult medical examination without abnormal findings    • Heart murmur    • Infertile male syndrome    • Intervertebral disc disorders with radiculopathy, lumbar region    • Low back pain    • Male hypogonadism     Male hypogonadism - not receiving testosterone replacement by urology      • Memory loss    • Pain in unspecified foot    • Palpitations        Past Surgical Hx:  Past Surgical History:   Procedure Laterality Date   • COLONOSCOPY N/A 4/19/2019    Procedure: COLONOSCOPY;  Surgeon: Alonso Goodson MD;  Location: [facility] ENDOSCOPY;  Service: Gastroenterology   • ENDOSCOPY N/A 4/19/2019    Procedure: ESOPHAGOGASTRODUODENOSCOPY;  Surgeon: Alonso Goodson MD;  Location: [facility] ENDOSCOPY;  Service: Gastroenterology   • INJECTION OF MEDICATION      Kenalog (1)      10/05/2015       • UPPER GASTROINTESTINAL ENDOSCOPY  04/19/2019       Health Maintenance:  Health Maintenance   Topic Date Due   • TDAP/TD VACCINES (2 - Td) 01/01/2016   • ANNUAL PHYSICAL  06/08/2019   • INFLUENZA VACCINE  08/01/2019       Current  Meds:    Current Outpatient Medications:   •  amphetamine-dextroamphetamine XR (ADDERALL XR) 20 MG 24 hr capsule, Take 1 capsule by mouth 2 (Two) Times a Day Earliest Fill Date: 10/30/19, Disp: 60 capsule, Rfl: 0    Allergies:  Patient has no known allergies.    Family Hx:  Family History   Problem Relation Age of Onset   • Asthma Other    • Diabetes Other    • Heart disease Other    • Hypertension Other    • Lung disease Other    • Coronary artery disease Father    • Hypertension Father    • Liver disease Mother         Social History:  Social History     Socioeconomic History   • Marital status:      Spouse name: Not on file   • Number of children: Not on file   • Years of education: Not on file   • Highest education level: Not on file   Tobacco Use   • Smoking status: Never Smoker   • Smokeless tobacco: Former User   Substance and Sexual Activity   • Alcohol use: No   • Drug use: No   • Sexual activity: Yes     Partners: Female       Review of Systems   Constitutional: Negative for activity change, appetite change, chills, fatigue and fever.   HENT: Negative for drooling, ear discharge, ear pain, facial swelling, hearing loss, mouth sores, rhinorrhea and sinus pain.    Eyes: Negative for pain, redness and itching.   Respiratory: Negative for cough, choking, chest tightness, shortness of breath and stridor.    Cardiovascular: Negative for chest pain, palpitations and leg swelling.   Gastrointestinal: Negative for abdominal distention, abdominal pain, anal bleeding, blood in stool, constipation, diarrhea and nausea.   Endocrine: Negative for heat intolerance, polydipsia and polyphagia.   Genitourinary: Negative for dysuria, flank pain, frequency and genital sores.   Musculoskeletal: Negative for back pain, gait problem, joint swelling and myalgias.   Skin: Negative for pallor and rash.   Neurological: Negative for seizures, facial asymmetry, speech difficulty, light-headedness, numbness and headaches.  "  Hematological: Negative for adenopathy. Does not bruise/bleed easily.   Psychiatric/Behavioral: Negative for confusion, decreased concentration, dysphoric mood and hallucinations. The patient is not nervous/anxious and is not hyperactive.            Objective:     /80 (BP Location: Right arm, Patient Position: Sitting, Cuff Size: Adult)   Pulse 83   Temp 97.5 °F (36.4 °C) (Tympanic)   Ht 177.8 cm (70\")   Wt 84.6 kg (186 lb 9.6 oz)   SpO2 99%   BMI 26.77 kg/m²     Physical Exam   Constitutional: He is oriented to person, place, and time. He appears well-developed and well-nourished.   HENT:   Head: Normocephalic and atraumatic.   Right Ear: External ear normal.   Left Ear: External ear normal.   Nose: Nose normal.   Mouth/Throat: Oropharynx is clear and moist.   Eyes: Conjunctivae and EOM are normal. Pupils are equal, round, and reactive to light.   Neck: Normal range of motion. Neck supple.   Cardiovascular: Normal rate, regular rhythm, normal heart sounds and intact distal pulses.   Pulmonary/Chest: Effort normal and breath sounds normal.   Abdominal: Soft. Bowel sounds are normal.   Musculoskeletal: Normal range of motion.   Neurological: He is alert and oriented to person, place, and time.   Skin: Skin is warm and dry.   Psychiatric: He has a normal mood and affect. His behavior is normal. Judgment and thought content normal.              Assessment/Plan:     Antione was seen today for adhd.    Diagnoses and all orders for this visit:    Long term use of drug  -     Urine Drug Screen - Urine, Clean Catch    ADHD  -     amphetamine-dextroamphetamine XR (ADDERALL XR) 20 MG 24 hr capsule; Take 1 capsule by mouth 2 (Two) Times a Day Earliest Fill Date: 10/30/19          Follow-up:     1 month     Goals:   Goals     • Patient reports that he would like to have improvment of his memory.  (pt-stated)      Barriers to goal: None          Barriers to goals: none     Health Maintenance   Topic Date Due   • " TDAP/TD VACCINES (2 - Td) 01/01/2016   • ANNUAL PHYSICAL  06/08/2019   • INFLUENZA VACCINE  08/01/2019       Tobacco: nonsmoker  Alcohol: does not drink  Lifestyle: Patient's Body mass index is 26.77 kg/m². BMI is within normal parameters. No follow-up required..   decrease soda or juice intake, increase water intake, reduce screen time and cut out extra servings    RISK SCORE: 2       Anna Quick M.D. PGY1  [facility]  [address] [place] [address]  Office: [phone]    This document has been electronically signed by Anna Quick MD on November 4, 2019 10:08 AM            This document has been electronically signed by Anna Quick MD on November 4, 2019 10:08 AM

## 2019-12-03 ENCOUNTER — TELEPHONE (OUTPATIENT)
Dept: FAMILY MEDICINE CLINIC | Facility: CLINIC | Age: 24
End: 2019-12-03

## 2019-12-03 RX ORDER — DEXTROAMPHETAMINE SACCHARATE, AMPHETAMINE ASPARTATE MONOHYDRATE, DEXTROAMPHETAMINE SULFATE AND AMPHETAMINE SULFATE 5; 5; 5; 5 MG/1; MG/1; MG/1; MG/1
20 CAPSULE, EXTENDED RELEASE ORAL 2 TIMES DAILY
Qty: 60 CAPSULE | Refills: 0 | Status: SHIPPED | OUTPATIENT
Start: 2019-12-03 | End: 2020-01-02

## 2019-12-03 NOTE — TELEPHONE ENCOUNTER
Patient needs refill for     amphetamine-dextroamphetamine XR (ADDERALL XR) 20 MG 24 hr capsule     Please call patient when ready     [phone]

## 2019-12-20 ENCOUNTER — OFFICE VISIT (OUTPATIENT)
Dept: FAMILY MEDICINE CLINIC | Facility: CLINIC | Age: 24
End: 2019-12-20

## 2019-12-20 VITALS
HEART RATE: 83 BPM | WEIGHT: 185 LBS | OXYGEN SATURATION: 99 % | SYSTOLIC BLOOD PRESSURE: 130 MMHG | DIASTOLIC BLOOD PRESSURE: 80 MMHG | HEIGHT: 70 IN | BODY MASS INDEX: 26.48 KG/M2

## 2019-12-20 DIAGNOSIS — G89.29 CHRONIC MIDLINE LOW BACK PAIN WITH LEFT-SIDED SCIATICA: Primary | ICD-10-CM

## 2019-12-20 DIAGNOSIS — M54.42 CHRONIC MIDLINE LOW BACK PAIN WITH LEFT-SIDED SCIATICA: Primary | ICD-10-CM

## 2019-12-20 PROCEDURE — 99213 OFFICE O/P EST LOW 20 MIN: CPT | Performed by: STUDENT IN AN ORGANIZED HEALTH CARE EDUCATION/TRAINING PROGRAM

## 2019-12-23 NOTE — PROGRESS NOTES
Subjective   Antione Villaseñor is a 24 y.o. male who presents for low back pain.  Low Back Pain  Patient complains of chronic low back pain. The patient first noted symptoms 6 years ago. It was not related to no known injury. The pain is rated 6 /10, and is located at the right lumbar area, left lumbar area, across the lower back or radiating to left leg(s). The pain is described as burning and occurs all day. The symptoms does not been progressive. Symptoms are exacerbated by sitting. Factors which relieve the pain include walking. Other associated symptoms include weakness in the left leg. Treatment efforts have included rest, ice, OTC NSAIDS, prescription NSAIDS, muscle relaxers, PT and home exercises, without relief.    Yesterday the pain was so terrible that he was crying from the pain. The low back pain is mostly tolerable but what really bothers hi mis the constant burning in his left leg that he must live with. 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 [email or web address]      Past Medical Hx:  Past Medical History:   Diagnosis Date   • ADHD (attention deficit hyperactivity disorder)    • Ankle joint pain    • Bipolar disorder (CMS/HCC)    • Burning feet syndrome    • Depressive disorder    • Dysuria     Dysuria - OVER ACTIVE BLADDER VS POLYURIA      • Encounter for general adult medical examination without abnormal findings    • Heart murmur    • Infertile male syndrome    • Intervertebral disc disorders with radiculopathy, lumbar region    • Low back pain    • Male hypogonadism     Male hypogonadism - not receiving testosterone replacement by urology      • Memory loss    • Pain in unspecified foot    • Palpitations        Past Surgical Hx:  Past Surgical History:   Procedure Laterality Date   • COLONOSCOPY N/A 4/19/2019    Procedure: COLONOSCOPY;  Surgeon: Alonso Goodson MD;  Location: [facility] ENDOSCOPY;   Service: Gastroenterology   • ENDOSCOPY N/A 4/19/2019    Procedure: ESOPHAGOGASTRODUODENOSCOPY;  Surgeon: Alonso Goodson MD;  Location: [facility] ENDOSCOPY;  Service: Gastroenterology   • INJECTION OF MEDICATION      Kenalog (1)      10/05/2015       • UPPER GASTROINTESTINAL ENDOSCOPY  04/19/2019       Health Maintenance:  Health Maintenance   Topic Date Due   • ANNUAL PHYSICAL  06/08/2019   • INFLUENZA VACCINE  08/01/2019   • TDAP/TD VACCINES (4 - Td) 01/01/2021       Current Meds:    Current Outpatient Medications:   •  amphetamine-dextroamphetamine XR (ADDERALL XR) 20 MG 24 hr capsule, Take 1 capsule by mouth 2 (Two) Times a Day for 30 days Earliest Fill Date: 12/3/19, Disp: 60 capsule, Rfl: 0    Allergies:  Patient has no known allergies.    Family Hx:  Family History   Problem Relation Age of Onset   • Asthma Other    • Diabetes Other    • Heart disease Other    • Hypertension Other    • Lung disease Other    • Coronary artery disease Father    • Hypertension Father    • Liver disease Mother         Social History:  Social History     Socioeconomic History   • Marital status:      Spouse name: Not on file   • Number of children: Not on file   • Years of education: Not on file   • Highest education level: Not on file   Tobacco Use   • Smoking status: Never Smoker   • Smokeless tobacco: Former User   Substance and Sexual Activity   • Alcohol use: No   • Drug use: No   • Sexual activity: Yes     Partners: Female       Review of Systems  Review of Systems   Constitutional: Negative for activity change, appetite change and fever.   HENT: Negative for congestion and trouble swallowing.    Respiratory: Negative for chest tightness and shortness of breath.    Cardiovascular: Negative for chest pain and palpitations.   Gastrointestinal: Negative for abdominal distention and abdominal pain.   Genitourinary: Negative for difficulty urinating and dysuria.   Musculoskeletal: Positive for back pain. Negative for  "arthralgias and myalgias.   Skin: Negative for color change and pallor.   Neurological: Positive for weakness (LLE). Negative for light-headedness and headaches.        Burning of LLE   Psychiatric/Behavioral: Negative for agitation and behavioral problems.            Objective:     /80   Pulse 83   Ht 177.8 cm (70\")   Wt 83.9 kg (185 lb)   SpO2 99%   BMI 26.54 kg/m²       Physical Exam   Constitutional: He is oriented to person, place, and time. He appears well-developed and well-nourished.   HENT:   Head: Normocephalic and atraumatic.   Right Ear: External ear normal.   Left Ear: External ear normal.   Nose: Nose normal.   Eyes: Pupils are equal, round, and reactive to light. EOM are normal.   Neck: Normal range of motion. Neck supple.   Cardiovascular: Normal rate, regular rhythm and normal heart sounds. Exam reveals no gallop and no friction rub.   No murmur heard.  Pulmonary/Chest: Effort normal and breath sounds normal. No respiratory distress. He has no wheezes. He has no rales.   Abdominal: Soft. Bowel sounds are normal. He exhibits no distension. There is no tenderness.   Musculoskeletal: Normal range of motion. He exhibits no edema.        Lumbar back: He exhibits no tenderness and no bony tenderness.   Neurological: He is alert and oriented to person, place, and time.   Skin: Skin is warm. Capillary refill takes less than 2 seconds. No erythema.   Psychiatric: He has a normal mood and affect. His behavior is normal.       Assessment/Plan:     1. Chronic midline low back pain with left-sided sciatica  - Referral to neurosurgery due to failed therapy with NSAIDs, PT on multiple occassions.         Follow-up:     Return in about 4 weeks (around 1/17/2020). To follow up neurosurgery referral.    Goals        Patient Stated    • Patient reports that he would like to have improvment of his memory.  (pt-stated)      Barriers to goal: None            Preventative:    Vaccines Recommended at this visit: "   No Vaccines recommended today. Patient is up to date on all vaccines.     Vaccines Received at this visit:  No Vaccines recommended today. Patient is up to date on all vaccines.     Screenings Recommended at this visit:  No Screenings offered today. Patient is up to date on all screenings at this time.     Screenings Ordered at this visit:  No screenings were offered today. Patient is up to date on all screenings.     Smoking Status:  Patient has never smoked.    Alcohol Intake:  Patient does not drink    Patient's Body mass index is 26.54 kg/m². BMI is above normal parameters. Recommendations include: exercise counseling and nutrition counseling.         RISK SCORE: 3          This document has been electronically signed by Deonte Scanlon MD on December 23, 2019 5:35 PM

## 2020-01-13 DIAGNOSIS — M54.50 CHRONIC MIDLINE LOW BACK PAIN WITHOUT SCIATICA: Primary | ICD-10-CM

## 2020-01-13 DIAGNOSIS — G89.29 CHRONIC MIDLINE LOW BACK PAIN WITHOUT SCIATICA: Primary | ICD-10-CM

## 2020-01-15 ENCOUNTER — TELEPHONE (OUTPATIENT)
Dept: FAMILY MEDICINE CLINIC | Facility: CLINIC | Age: 25
End: 2020-01-15

## 2020-01-15 NOTE — TELEPHONE ENCOUNTER
Instructions         Return in about 4 weeks (around 11/27/2019) for Recheck      PLEASE SCHEDULE PATIENT AN APPOINTMENT

## 2020-01-15 NOTE — TELEPHONE ENCOUNTER
Pt called and is needing a refill on his amphetamine-dextroamphetamine XR (ADDERALL XR) 20 MG 24 hr capsule    I didn't see anything on his med list for this and I got this from the previous medications. I figured he may need to be seen to get the medication. He would like a call back please at [phone].      Thank you,      Yesenia

## 2020-01-20 ENCOUNTER — HOSPITAL ENCOUNTER (OUTPATIENT)
Dept: MRI IMAGING | Facility: HOSPITAL | Age: 25
Discharge: HOME | End: 2020-01-20
Admitting: FAMILY MEDICINE

## 2020-01-20 DIAGNOSIS — M54.50 CHRONIC MIDLINE LOW BACK PAIN WITHOUT SCIATICA: ICD-10-CM

## 2020-01-20 DIAGNOSIS — G89.29 CHRONIC MIDLINE LOW BACK PAIN WITHOUT SCIATICA: ICD-10-CM

## 2020-01-20 PROCEDURE — 72148 MRI LUMBAR SPINE W/O DYE: CPT

## 2020-01-21 NOTE — TELEPHONE ENCOUNTER
TRIED TO CALL PATIENT; NO ANSWER, LEFT MESSAGE.     PT WILL NEED TO COME INTO OFFICE TO GET REFILLS ON ADDERALL.

## 2020-02-14 ENCOUNTER — APPOINTMENT (OUTPATIENT)
Dept: LAB | Facility: HOSPITAL | Age: 25
End: 2020-02-14

## 2020-02-14 ENCOUNTER — OFFICE VISIT (OUTPATIENT)
Dept: FAMILY MEDICINE CLINIC | Facility: CLINIC | Age: 25
End: 2020-02-14

## 2020-02-14 ENCOUNTER — DOCUMENTATION (OUTPATIENT)
Dept: FAMILY MEDICINE CLINIC | Facility: CLINIC | Age: 25
End: 2020-02-14

## 2020-02-14 VITALS
DIASTOLIC BLOOD PRESSURE: 78 MMHG | SYSTOLIC BLOOD PRESSURE: 126 MMHG | BODY MASS INDEX: 26.07 KG/M2 | HEART RATE: 80 BPM | OXYGEN SATURATION: 98 % | HEIGHT: 70 IN | WEIGHT: 182.1 LBS

## 2020-02-14 DIAGNOSIS — F90.0 ATTENTION DEFICIT HYPERACTIVITY DISORDER (ADHD), PREDOMINANTLY INATTENTIVE TYPE: Primary | ICD-10-CM

## 2020-02-14 PROCEDURE — 99213 OFFICE O/P EST LOW 20 MIN: CPT | Performed by: STUDENT IN AN ORGANIZED HEALTH CARE EDUCATION/TRAINING PROGRAM

## 2020-02-14 PROCEDURE — 80306 DRUG TEST PRSMV INSTRMNT: CPT | Performed by: STUDENT IN AN ORGANIZED HEALTH CARE EDUCATION/TRAINING PROGRAM

## 2020-02-14 RX ORDER — DEXTROAMPHETAMINE SACCHARATE, AMPHETAMINE ASPARTATE MONOHYDRATE, DEXTROAMPHETAMINE SULFATE AND AMPHETAMINE SULFATE 5; 5; 5; 5 MG/1; MG/1; MG/1; MG/1
20 CAPSULE, EXTENDED RELEASE ORAL 2 TIMES DAILY
Qty: 60 CAPSULE | Refills: 0 | Status: SHIPPED | OUTPATIENT
Start: 2020-02-14 | End: 2020-03-12 | Stop reason: SDUPTHER

## 2020-02-14 RX ORDER — DEXTROAMPHETAMINE SACCHARATE, AMPHETAMINE ASPARTATE MONOHYDRATE, DEXTROAMPHETAMINE SULFATE AND AMPHETAMINE SULFATE 5; 5; 5; 5 MG/1; MG/1; MG/1; MG/1
20 CAPSULE, EXTENDED RELEASE ORAL 2 TIMES DAILY
COMMUNITY
End: 2020-02-14 | Stop reason: SDUPTHER

## 2020-02-14 NOTE — PROGRESS NOTES
Family Medicine Residency  Mario Newton III, MD    Subjective:     Antione Villaseñor is a 24 y.o. male who presents for ADHD    Patient has been off of his medication for approximately 1 month.  Patient with issues with re-failing medication.  Patient has a noncompliant appointments and needed to present for medication refill.  Patient previously has taken immediate release with issues with GI bleed that resolved after he stopped taking it.  Patient interested in longer acting second-generation methylphenidate type medication where he does not have to take medication twice a day and decrease side effect profile.  Patient did not report tachycardia constipation or insomnia on this medication.  Patient reports previous employee colleague who had similar insurance was able to afford and get covered methylphenidate type option.  Patient tested 2 years ago by Dr. Galicia PhD psychology here in the clinic.  Patient reports worsened focus without hyperactivity symptoms.  Patient has transition in his job and responsibilities have changed so no work baseline has been available for comparison at this time.  Patient has not taken medication today but is agreeable for UDS.    The following portions of the patient's history were reviewed and updated as appropriate: allergies, current medications, past family history, past medical history, past social history, past surgical history and problem list.    Past Medical Hx:  Past Medical History:   Diagnosis Date   • ADHD (attention deficit hyperactivity disorder)    • Ankle joint pain    • Bipolar disorder (CMS/[facility])    • Burning feet syndrome    • Depressive disorder    • Dysuria     Dysuria - OVER ACTIVE BLADDER VS POLYURIA      • Encounter for general adult medical examination without abnormal findings    • Heart murmur    • Infertile male syndrome    • Intervertebral disc disorders with radiculopathy, lumbar region    • Low back pain    • Male hypogonadism     Male  hypogonadism - not receiving testosterone replacement by urology      • Memory loss    • Pain in unspecified foot    • Palpitations        Past Surgical Hx:  Past Surgical History:   Procedure Laterality Date   • COLONOSCOPY N/A 4/19/2019    Procedure: COLONOSCOPY;  Surgeon: Alonso Goodson MD;  Location: [facility] ENDOSCOPY;  Service: Gastroenterology   • ENDOSCOPY N/A 4/19/2019    Procedure: ESOPHAGOGASTRODUODENOSCOPY;  Surgeon: Alonso Goodson MD;  Location: [facility] ENDOSCOPY;  Service: Gastroenterology   • INJECTION OF MEDICATION      Kenalog (1)      10/05/2015       • UPPER GASTROINTESTINAL ENDOSCOPY  04/19/2019       Current Meds:    Current Outpatient Medications:   •  amphetamine-dextroamphetamine XR (ADDERALL XR) 20 MG 24 hr capsule, Take 1 capsule by mouth 2 (Two) Times a Day, Disp: 60 capsule, Rfl: 0    Allergies:  No Known Allergies    Family Hx:  Family History   Problem Relation Age of Onset   • Asthma Other    • Diabetes Other    • Heart disease Other    • Hypertension Other    • Lung disease Other    • Coronary artery disease Father    • Hypertension Father    • Liver disease Mother         Social History:  Social History     Socioeconomic History   • Marital status:      Spouse name: Not on file   • Number of children: Not on file   • Years of education: Not on file   • Highest education level: Not on file   Tobacco Use   • Smoking status: Never Smoker   • Smokeless tobacco: Former User   Substance and Sexual Activity   • Alcohol use: No   • Drug use: No   • Sexual activity: Yes     Partners: Female       Review of Systems  Review of Systems   Constitutional: Positive for activity change. Negative for appetite change, chills, diaphoresis and fever.   HENT: Negative for congestion, rhinorrhea, sinus pressure, sinus pain and sore throat.    Eyes: Negative for visual disturbance.   Respiratory: Negative for apnea, cough, choking, chest tightness, shortness of breath and wheezing.   "  Cardiovascular: Negative for chest pain, palpitations and leg swelling.   Gastrointestinal: Negative for abdominal distention, abdominal pain, blood in stool, constipation, diarrhea, nausea and vomiting.   Genitourinary: Negative for difficulty urinating, dysuria, frequency, hematuria and urgency.   Musculoskeletal: Negative for arthralgias, back pain, joint swelling, myalgias and neck pain.   Skin: Negative for color change, pallor, rash and wound.   Neurological: Negative for dizziness, weakness, numbness and headaches.   Psychiatric/Behavioral: Positive for decreased concentration. Negative for agitation, behavioral problems, dysphoric mood, hallucinations, self-injury, sleep disturbance and suicidal ideas. The patient is not hyperactive.        Objective:     /78 (BP Location: Right arm, Patient Position: Sitting, Cuff Size: Adult)   Pulse 80   Ht 177.8 cm (70\")   Wt 82.6 kg (182 lb 1.6 oz)   SpO2 98%   BMI 26.13 kg/m²   Physical Exam   Constitutional: He is oriented to person, place, and time. He appears well-developed and well-nourished. No distress.   HENT:   Head: Normocephalic and atraumatic.   Right Ear: External ear normal.   Left Ear: External ear normal.   Nose: Nose normal.   Eyes: Pupils are equal, round, and reactive to light. Conjunctivae and EOM are normal. Right eye exhibits no discharge. Left eye exhibits no discharge. No scleral icterus.   Neck: Normal range of motion. Neck supple. No thyromegaly present.   Cardiovascular: Normal rate, regular rhythm, normal heart sounds and intact distal pulses. Exam reveals no gallop and no friction rub.   No murmur heard.  Pulmonary/Chest: Effort normal and breath sounds normal. No respiratory distress. He has no wheezes. He has no rales. He exhibits no tenderness.   Abdominal: Soft. Bowel sounds are normal. He exhibits no distension and no mass. There is no tenderness. There is no guarding.   Musculoskeletal: Normal range of motion. He exhibits " no edema, tenderness or deformity.   Lymphadenopathy:     He has no cervical adenopathy.   Neurological: He is alert and oriented to person, place, and time. No cranial nerve deficit.   Skin: Skin is warm and dry. Capillary refill takes 2 to 3 seconds. He is not diaphoretic.   Psychiatric: He has a normal mood and affect. His behavior is normal. Judgment and thought content normal.        Assessment/Plan:     Antione was seen today for follow-up.    Diagnoses and all orders for this visit:    Attention deficit hyperactivity disorder (ADHD), predominantly inattentive type  -     Urine Drug Screen - Urine, Clean Catch  -     amphetamine-dextroamphetamine XR (ADDERALL XR) 20 MG 24 hr capsule; Take 1 capsule by mouth 2 (Two) Times a Day    Will refill medication for patient.  Will obtain UDS as patient also has not taken his medication in over a month amphetamine is expected to be negative.  The screen will be for other illicit substances.  Patient does not report taking those or smoking.  ROCIO consistent with lack of medication for greater than 1 month.  Will defer to primary care provider for switching to methylphenidate type long-acting medication where he does not need twice a day dosing.    · Rx changes: Refill of ADHD  · Patient Education: ADHD  · Compliance at present is estimated to be fair.   · Efforts to improve compliance (if necessary) will be directed at increased exercise.     Follow-up:     Return in about 1 month (around 3/14/2020) for Recheck ADHD COnsider methylphenidate.    Preventative:  Health Maintenance   Topic Date Due   • ANNUAL PHYSICAL  06/08/2019   • TDAP/TD VACCINES (4 - Td) 01/01/2021   • INFLUENZA VACCINE  Completed     Male Preventative: Cholesterol screening up to date  Recommended: none  Vaccine Counseling: N/A    Weight  -Class: Overweight: 25.0-29.9kg/m2   -Patient's Body mass index is 26.13 kg/m². BMI is within normal parameters. No follow-up required..   eat more fruits and  vegetables, decrease soda or juice intake, increase water intake, increase physical activity, reduce screen time, reduce portion size, cut out extra servings, reduce fast food intake, family to eat at dinner table more often, keep TV off during meals, plan meals, eat breakfast and have 3 meals a day    Alcohol use:  reports that he does not drink alcohol.  Nicotine status  reports that he has never smoked. He has quit using smokeless tobacco.    ROCIO: [id number] reviewed and appropriate with medication deficit for 1 month.    Goals     • Patient reports that he would like to have improvment of his memory.  (pt-stated)      Barriers to goal: None            RISK SCORE: 3            This document has been electronically signed by Mario Newton III, MD on February 14, 2020 11:00 AM

## 2020-02-14 NOTE — PROGRESS NOTES
I have reviewed the notes, assessments, and/or procedures performed by Dr. Newton, I concur with her/his documentation and assessment and plan for Antione Villaseñor.                This document has been electronically signed by Juvenal Rodgers MD on February 14, 2020 1:43 PM

## 2020-02-14 NOTE — PROGRESS NOTES
We are concerned regarding multiple UDS is of negative while filling his prescription.  No clear documentation of patient was taking stimulant during urine drug screen.  May consider non-stimulant in this patient with mild concern for diversion.        This document has been electronically signed by Mario Newton III, MD on February 14, 2020 1:51 PM

## 2020-03-11 ENCOUNTER — TELEPHONE (OUTPATIENT)
Dept: FAMILY MEDICINE CLINIC | Facility: CLINIC | Age: 25
End: 2020-03-11

## 2020-03-11 DIAGNOSIS — F90.0 ATTENTION DEFICIT HYPERACTIVITY DISORDER (ADHD), PREDOMINANTLY INATTENTIVE TYPE: ICD-10-CM

## 2020-03-11 NOTE — TELEPHONE ENCOUNTER
PATIENT CALLED ASKING FOR A MEDICATION REFILL ON   amphetamine-dextroamphetamine XR (ADDERALL XR) 20 MG 24 hr capsule. PATIENT SAID THAT HE WILL BE OUT OF MEDICATION BEFORE HIS APT AND HIS APT IS NOT ACTUALLY FOR HIS MEDICATION REFILLS.     PATIENT WOULD LIKE A CALL WHEN HIS MEDS ARE READY FOR .    THANKS,  ASHLEY

## 2020-03-12 RX ORDER — DEXTROAMPHETAMINE SACCHARATE, AMPHETAMINE ASPARTATE MONOHYDRATE, DEXTROAMPHETAMINE SULFATE AND AMPHETAMINE SULFATE 5; 5; 5; 5 MG/1; MG/1; MG/1; MG/1
20 CAPSULE, EXTENDED RELEASE ORAL 2 TIMES DAILY
Qty: 60 CAPSULE | Refills: 0 | Status: SHIPPED | OUTPATIENT
Start: 2020-03-12 | End: 2020-04-22 | Stop reason: SDUPTHER

## 2020-04-22 ENCOUNTER — OFFICE VISIT (OUTPATIENT)
Dept: FAMILY MEDICINE CLINIC | Facility: CLINIC | Age: 25
End: 2020-04-22

## 2020-04-22 VITALS — HEIGHT: 70 IN | BODY MASS INDEX: 25.05 KG/M2 | WEIGHT: 175 LBS

## 2020-04-22 DIAGNOSIS — F90.0 ATTENTION DEFICIT HYPERACTIVITY DISORDER (ADHD), PREDOMINANTLY INATTENTIVE TYPE: ICD-10-CM

## 2020-04-22 PROCEDURE — 99442 PR PHYS/QHP TELEPHONE EVALUATION 11-20 MIN: CPT | Performed by: STUDENT IN AN ORGANIZED HEALTH CARE EDUCATION/TRAINING PROGRAM

## 2020-04-22 RX ORDER — DEXTROAMPHETAMINE SACCHARATE, AMPHETAMINE ASPARTATE MONOHYDRATE, DEXTROAMPHETAMINE SULFATE AND AMPHETAMINE SULFATE 5; 5; 5; 5 MG/1; MG/1; MG/1; MG/1
20 CAPSULE, EXTENDED RELEASE ORAL 2 TIMES DAILY
Qty: 60 CAPSULE | Refills: 0 | Status: SHIPPED | OUTPATIENT
Start: 2020-04-22 | End: 2020-06-04 | Stop reason: SDUPTHER

## 2020-04-22 NOTE — PROGRESS NOTES
I have reviewed the patient.  I have reviewed the notes, assessments, and/or procedures performed by Dr Deonte Scanlon, I concur with his  documentation and assessment and plan for Antione Villaseñor.          This document has been electronically signed by Orlando Fournier MD on April 22, 2020 15:28

## 2020-04-22 NOTE — PROGRESS NOTES
Family Medicine Residency  Deonte Scanlon MD    Subjective:     Antione Villaseñor is a 24 y.o. male who presents for ADHD. He states he is happy with medication. Is able to focus, concentrate, and complete his responsibilities. Is sleeping, and eating well. Will like to continue regimen.    The following portions of the patient's history were reviewed and updated as appropriate: allergies, current medications, past family history, past medical history, past social history, past surgical history and problem list.    Past Medical Hx:  Past Medical History:   Diagnosis Date   • ADHD (attention deficit hyperactivity disorder)    • Ankle joint pain    • Bipolar disorder (CMS/HCC)    • Burning feet syndrome    • Depressive disorder    • Dysuria     Dysuria - OVER ACTIVE BLADDER VS POLYURIA      • Encounter for general adult medical examination without abnormal findings    • Heart murmur    • Infertile male syndrome    • Intervertebral disc disorders with radiculopathy, lumbar region    • Low back pain    • Male hypogonadism     Male hypogonadism - not receiving testosterone replacement by urology      • Memory loss    • Pain in unspecified foot    • Palpitations        Past Surgical Hx:  Past Surgical History:   Procedure Laterality Date   • COLONOSCOPY N/A 4/19/2019    Procedure: COLONOSCOPY;  Surgeon: Alonso Goodson MD;  Location: [facility] ENDOSCOPY;  Service: Gastroenterology   • ENDOSCOPY N/A 4/19/2019    Procedure: ESOPHAGOGASTRODUODENOSCOPY;  Surgeon: Alonso Goodson MD;  Location: [facility] ENDOSCOPY;  Service: Gastroenterology   • INJECTION OF MEDICATION      Kenalog (1)      10/05/2015       • UPPER GASTROINTESTINAL ENDOSCOPY  04/19/2019       Current Meds:    Current Outpatient Medications:   •  amphetamine-dextroamphetamine XR (Adderall XR) 20 MG 24 hr capsule, Take 1 capsule by mouth 2 (Two) Times a Day, Disp: 60 capsule, Rfl: 0    Allergies:  No Known Allergies    Family Hx:  Family History   Problem  "Relation Age of Onset   • Asthma Other    • Diabetes Other    • Heart disease Other    • Hypertension Other    • Lung disease Other    • Coronary artery disease Father    • Hypertension Father    • Liver disease Mother         Social History:  Social History     Socioeconomic History   • Marital status:      Spouse name: Not on file   • Number of children: Not on file   • Years of education: Not on file   • Highest education level: Not on file   Tobacco Use   • Smoking status: Never Smoker   • Smokeless tobacco: Former User   Substance and Sexual Activity   • Alcohol use: No   • Drug use: No   • Sexual activity: Yes     Partners: Female       Review of Systems  Review of Systems   Constitutional: Negative for activity change, appetite change and fever.   HENT: Negative for congestion and trouble swallowing.    Respiratory: Negative for chest tightness and shortness of breath.    Cardiovascular: Negative for chest pain and palpitations.   Gastrointestinal: Negative for abdominal distention and abdominal pain.   Genitourinary: Negative for difficulty urinating and dysuria.   Musculoskeletal: Negative for arthralgias and myalgias.   Skin: Negative for color change and pallor.   Neurological: Negative for weakness, light-headedness and headaches.   Psychiatric/Behavioral: Negative for agitation and behavioral problems.       Objective:     Ht 177.8 cm (70\")   Wt 79.4 kg (175 lb) Comment: reported  BMI 25.11 kg/m²      No physical was performed as this was a telephone visit.    Physical Exam     Assessment/Plan:     Antione was seen today for adhd.    Diagnoses and all orders for this visit:    Attention deficit hyperactivity disorder (ADHD), predominantly inattentive type  -     amphetamine-dextroamphetamine XR (Adderall XR) 20 MG 24 hr capsule; Take 1 capsule by mouth 2 (Two) Times a Day  - Jarret reviewed and appropriate, request # [id number].        · Rx changes: None  · Patient Education: None  · Compliance at " present is estimated to be excellent.   · Efforts to improve compliance (if necessary) will be directed at unnecessary at this time.     Follow-up:     Return in about 3 months (around 7/22/2020). To reassess ADHD symptoms.    Preventative:  Health Maintenance   Topic Date Due   • ANNUAL PHYSICAL  06/08/2019   • INFLUENZA VACCINE  08/01/2020   • TDAP/TD VACCINES (4 - Td) 01/01/2021   • HEPATITIS C SCREENING  Completed       Recommended: none  Vaccine Counseling: N/A    Weight  -Class: Overweight: 25.0-29.9kg/m2   -Patient's Body mass index is 25.11 kg/m². BMI is above normal parameters. Recommendations include: exercise counseling and nutrition counseling.   eat more fruits and vegetables, decrease soda or juice intake, increase water intake, increase physical activity and reduce screen time    Alcohol use:  reports that he does not drink alcohol.  Nicotine status  reports that he has never smoked. He has quit using smokeless tobacco.    Goals        Patient Stated    • Patient reports that he would like to have improvment of his memory.  (pt-stated)      Barriers to goal: None            RISK SCORE: 1          This document has been electronically signed by Deonte Scanlon MD on April 22, 2020 15:22    The total duration of this telephone encounter was 12 minutes.

## 2020-06-04 DIAGNOSIS — F90.0 ATTENTION DEFICIT HYPERACTIVITY DISORDER (ADHD), PREDOMINANTLY INATTENTIVE TYPE: ICD-10-CM

## 2020-06-04 RX ORDER — DEXTROAMPHETAMINE SACCHARATE, AMPHETAMINE ASPARTATE MONOHYDRATE, DEXTROAMPHETAMINE SULFATE AND AMPHETAMINE SULFATE 5; 5; 5; 5 MG/1; MG/1; MG/1; MG/1
20 CAPSULE, EXTENDED RELEASE ORAL 2 TIMES DAILY
Qty: 60 CAPSULE | Refills: 0 | Status: SHIPPED | OUTPATIENT
Start: 2020-06-04 | End: 2020-07-02 | Stop reason: SDUPTHER

## 2020-06-04 NOTE — TELEPHONE ENCOUNTER
PATIENT CALLED AND IS NEEDING REFILLS ON THE FOLLOWING MEDICATIONS: amphetamine-dextroamphetamine XR (Adderall XR) 20 MG 24 hr capsule AND WOULD LIKE FOR IT TO BE SENT TO [facility] ON [facility].      THANK YOU,      LIZY

## 2020-07-02 ENCOUNTER — TELEPHONE (OUTPATIENT)
Dept: FAMILY MEDICINE CLINIC | Facility: CLINIC | Age: 25
End: 2020-07-02

## 2020-07-02 DIAGNOSIS — F90.0 ATTENTION DEFICIT HYPERACTIVITY DISORDER (ADHD), PREDOMINANTLY INATTENTIVE TYPE: ICD-10-CM

## 2020-07-02 DIAGNOSIS — F90.0 ATTENTION DEFICIT HYPERACTIVITY DISORDER (ADHD), PREDOMINANTLY INATTENTIVE TYPE: Primary | ICD-10-CM

## 2020-07-02 RX ORDER — DEXTROAMPHETAMINE SACCHARATE, AMPHETAMINE ASPARTATE MONOHYDRATE, DEXTROAMPHETAMINE SULFATE AND AMPHETAMINE SULFATE 5; 5; 5; 5 MG/1; MG/1; MG/1; MG/1
20 CAPSULE, EXTENDED RELEASE ORAL 2 TIMES DAILY
Qty: 60 CAPSULE | Refills: 0 | Status: SHIPPED | OUTPATIENT
Start: 2020-07-02 | End: 2020-09-21

## 2020-07-02 RX ORDER — DEXTROAMPHETAMINE SACCHARATE, AMPHETAMINE ASPARTATE MONOHYDRATE, DEXTROAMPHETAMINE SULFATE AND AMPHETAMINE SULFATE 5; 5; 5; 5 MG/1; MG/1; MG/1; MG/1
20 CAPSULE, EXTENDED RELEASE ORAL EVERY MORNING
Qty: 30 CAPSULE | Refills: 0 | Status: SHIPPED | OUTPATIENT
Start: 2020-07-02 | End: 2020-09-21

## 2020-07-02 NOTE — TELEPHONE ENCOUNTER
Patient called need a refill on amphetamine-dextroamphetamine XR (Adderall XR) 20 MG 24 hr capsule           Pharmacy:  [id number] - [place], [place] - [address] AT [facility] - [phone]  - [phone] FX Phone:  [phone] Fax:  [phone]    Address:  [address] [address]            Thank you :)

## 2020-09-21 ENCOUNTER — OFFICE VISIT (OUTPATIENT)
Dept: FAMILY MEDICINE CLINIC | Facility: CLINIC | Age: 25
End: 2020-09-21

## 2020-09-21 VITALS
DIASTOLIC BLOOD PRESSURE: 78 MMHG | SYSTOLIC BLOOD PRESSURE: 122 MMHG | OXYGEN SATURATION: 98 % | HEIGHT: 70 IN | BODY MASS INDEX: 24.35 KG/M2 | HEART RATE: 97 BPM | TEMPERATURE: 98.3 F | WEIGHT: 170.1 LBS

## 2020-09-21 DIAGNOSIS — F90.0 ATTENTION DEFICIT HYPERACTIVITY DISORDER (ADHD), PREDOMINANTLY INATTENTIVE TYPE: ICD-10-CM

## 2020-09-21 PROCEDURE — 99213 OFFICE O/P EST LOW 20 MIN: CPT | Performed by: STUDENT IN AN ORGANIZED HEALTH CARE EDUCATION/TRAINING PROGRAM

## 2020-09-21 RX ORDER — DEXTROAMPHETAMINE SACCHARATE, AMPHETAMINE ASPARTATE MONOHYDRATE, DEXTROAMPHETAMINE SULFATE AND AMPHETAMINE SULFATE 5; 5; 5; 5 MG/1; MG/1; MG/1; MG/1
20 CAPSULE, EXTENDED RELEASE ORAL EVERY MORNING
Qty: 30 CAPSULE | Refills: 0 | Status: CANCELLED | OUTPATIENT
Start: 2020-09-21

## 2020-09-21 RX ORDER — DEXTROAMPHETAMINE SACCHARATE, AMPHETAMINE ASPARTATE MONOHYDRATE, DEXTROAMPHETAMINE SULFATE AND AMPHETAMINE SULFATE 5; 5; 5; 5 MG/1; MG/1; MG/1; MG/1
20 CAPSULE, EXTENDED RELEASE ORAL 2 TIMES DAILY
Qty: 60 CAPSULE | Refills: 0 | Status: CANCELLED | OUTPATIENT
Start: 2020-09-21

## 2020-09-21 RX ORDER — DEXTROAMPHETAMINE SACCHARATE, AMPHETAMINE ASPARTATE MONOHYDRATE, DEXTROAMPHETAMINE SULFATE AND AMPHETAMINE SULFATE 3.75; 3.75; 3.75; 3.75 MG/1; MG/1; MG/1; MG/1
15 CAPSULE, EXTENDED RELEASE ORAL 2 TIMES DAILY
Qty: 60 CAPSULE | Refills: 0 | Status: SHIPPED | OUTPATIENT
Start: 2020-09-21 | End: 2020-10-21

## 2020-09-21 NOTE — PROGRESS NOTES
Family Medicine Residency  Michel Santos MD    Subjective:     Antione Villaseñor is a 25 y.o. male who presents for follow-up of ADHD.  He was last on Adderall XR 20 mg twice daily.  He has not been seen in 2 months due to the pandemic and wanted to see how we did off the medication.  He is concerned about tolerance and long-term use of stimulant medication.  After stopping the medication, he reports fatigue and difficulties with focus.  While on the medication, he has not had any issues with appetite, sleep, headaches, tachycardia, tics, or chest pain.  He inquires about Vyvanse specifically.    The following portions of the patient's history were reviewed and updated as appropriate: allergies, current medications, past family history, past medical history, past social history, past surgical history and problem list.    Past Medical Hx:  Past Medical History:   Diagnosis Date   • ADHD (attention deficit hyperactivity disorder)    • Ankle joint pain    • Bipolar disorder (CMS/HCC)    • Burning feet syndrome    • Depressive disorder    • Dysuria     Dysuria - OVER ACTIVE BLADDER VS POLYURIA      • Encounter for general adult medical examination without abnormal findings    • Heart murmur    • Infertile male syndrome    • Intervertebral disc disorders with radiculopathy, lumbar region    • Low back pain    • Male hypogonadism     Male hypogonadism - not receiving testosterone replacement by urology      • Memory loss    • Pain in unspecified foot    • Palpitations        Past Surgical Hx:  Past Surgical History:   Procedure Laterality Date   • COLONOSCOPY N/A 4/19/2019    Procedure: COLONOSCOPY;  Surgeon: Alonso Goodson MD;  Location: [facility] ENDOSCOPY;  Service: Gastroenterology   • ENDOSCOPY N/A 4/19/2019    Procedure: ESOPHAGOGASTRODUODENOSCOPY;  Surgeon: Alonso Goodson MD;  Location: [facility] ENDOSCOPY;  Service: Gastroenterology   • INJECTION OF MEDICATION      Kenalog (1)      10/05/2015       • UPPER  GASTROINTESTINAL ENDOSCOPY  04/19/2019       Current Meds:    Current Outpatient Medications:   •  amphetamine-dextroamphetamine XR (Adderall XR) 15 MG 24 hr capsule, Take 1 capsule by mouth 2 (two) times a day for 30 days, Disp: 60 capsule, Rfl: 0    Allergies:  No Known Allergies    Family Hx:  Family History   Problem Relation Age of Onset   • Asthma Other    • Diabetes Other    • Heart disease Other    • Hypertension Other    • Lung disease Other    • Coronary artery disease Father    • Hypertension Father    • Liver disease Mother         Social History:  Social History     Socioeconomic History   • Marital status:      Spouse name: Not on file   • Number of children: Not on file   • Years of education: Not on file   • Highest education level: Not on file   Tobacco Use   • Smoking status: Never Smoker   • Smokeless tobacco: Former User   Substance and Sexual Activity   • Alcohol use: No   • Drug use: No   • Sexual activity: Yes     Partners: Female       Review of Systems  Review of Systems   Constitutional: Negative for activity change, appetite change, chills, fatigue and fever.   HENT: Negative for congestion, rhinorrhea, sinus pain and sore throat.    Eyes: Negative for pain, redness and visual disturbance.   Respiratory: Negative for cough, shortness of breath and wheezing.    Cardiovascular: Negative for chest pain, palpitations and leg swelling.   Gastrointestinal: Negative for abdominal pain, constipation, diarrhea, nausea and vomiting.   Genitourinary: Negative for dysuria and flank pain.   Musculoskeletal: Negative for arthralgias, joint swelling and myalgias.   Skin: Negative for color change, pallor and rash.   Neurological: Negative for dizziness, numbness and headaches.   Psychiatric/Behavioral: Positive for decreased concentration. Negative for dysphoric mood and sleep disturbance. The patient is not nervous/anxious.        Objective:     /78   Pulse 97   Temp 98.3 °F (36.8 °C)    "Ht 177.8 cm (70\")   Wt 77.2 kg (170 lb 1.6 oz)   SpO2 98%   BMI 24.41 kg/m²   Physical Exam  Constitutional:       General: He is not in acute distress.     Appearance: Normal appearance. He is well-developed. He is not diaphoretic.   HENT:      Head: Normocephalic and atraumatic.      Right Ear: Hearing normal.      Left Ear: Hearing normal.   Eyes:      General: Lids are normal.      Conjunctiva/sclera: Conjunctivae normal.      Pupils: Pupils are equal, round, and reactive to light.   Neck:      Musculoskeletal: Normal range of motion and neck supple.      Vascular: No JVD.   Cardiovascular:      Rate and Rhythm: Normal rate and regular rhythm.      Heart sounds: Normal heart sounds, S1 normal and S2 normal. No murmur.   Pulmonary:      Effort: Pulmonary effort is normal. No respiratory distress.      Breath sounds: Normal breath sounds. No wheezing or rales.   Abdominal:      General: Bowel sounds are normal. There is no distension.      Palpations: Abdomen is soft.      Tenderness: There is no abdominal tenderness. There is no guarding.   Musculoskeletal: Normal range of motion.         General: No tenderness or deformity.   Lymphadenopathy:      Cervical: No cervical adenopathy.   Skin:     General: Skin is warm and dry.      Coloration: Skin is not pale.      Findings: No erythema or rash.   Neurological:      Mental Status: He is alert and oriented to person, place, and time. He is not disoriented.   Psychiatric:         Speech: Speech normal.         Behavior: Behavior normal.          Assessment/Plan:     Antione was seen today for adhd.    Diagnoses and all orders for this visit:    Attention deficit hyperactivity disorder (ADHD), predominantly inattentive type  -     amphetamine-dextroamphetamine XR (Adderall XR) 15 MG 24 hr capsule; Take 1 capsule by mouth 2 (two) times a day for 30 days  - [facility] request #[id number]. Reviewed and appropriate.   - Will decrease Adderall XR to 15mg BID. Patient prefers " only taking medication once daily but is not well controlled with just one Adderall XR. Plan to transition to Vyvanse but will need prior authorization and insurance approval. Discussed with Barbara Quezada and will get process started. RTC 1 month.  - UDS at next visit.    · Rx changes: Adderall XR 15mg BID              · Patient Education: see a/p  · Compliance at present is estimated to be good.      Follow-up:     Return in about 4 weeks (around 10/19/2020) for Recheck.    Preventative:  Health Maintenance   Topic Date Due   • ANNUAL PHYSICAL  06/08/2019   • INFLUENZA VACCINE  08/01/2020   • TDAP/TD VACCINES (4 - Td) 01/01/2021   •  AMB Pneumococcal Vaccine 65+ (1 of 1 - PPSV23) 08/27/2058   • HEPATITIS C SCREENING  Completed   •  AMB Pneumococcal Vaccine 0-64  Aged Out     Male Preventative: Exercises regularly  Recommended: none  Vaccine Counseling: N/A    Weight  -Class: Normal: 18.5-24.9kg/m2  -Patient's Body mass index is 24.41 kg/m². BMI is within normal parameters. No follow-up required..   eat more fruits and vegetables, decrease soda or juice intake, increase water intake and increase physical activity    Alcohol use:  reports no history of alcohol use.  Nicotine status  reports that he has never smoked. He has quit using smokeless tobacco.    Goals     • Patient reports that he would like to have improvment of his memory.  (pt-stated)      Barriers to goal: None            RISK SCORE: 3      Michel Santos M.D. PGY2  [facility] Medicine [facility]  [address] [place] [address]  Office: [phone]  This document has been electronically signed by Michel Santos MD on September 21, 2020 14:37 CDT

## 2020-09-21 NOTE — PROGRESS NOTES
I have reviewed the patient.  I have reviewed the notes, assessments, and/or procedures performed by Dr Michel Santos, I concur with his  documentation and assessment and plan for Antione Villaseñor.          This document has been electronically signed by Orlando Fournier MD on September 21, 2020 16:16 CDT

## 2020-11-03 ENCOUNTER — APPOINTMENT (OUTPATIENT)
Dept: CT IMAGING | Facility: HOSPITAL | Age: 25
End: 2020-11-03

## 2020-11-03 ENCOUNTER — HOSPITAL ENCOUNTER (EMERGENCY)
Facility: HOSPITAL | Age: 25
Discharge: HOME | End: 2020-11-03
Attending: FAMILY MEDICINE | Admitting: FAMILY MEDICINE

## 2020-11-03 VITALS
BODY MASS INDEX: 25.54 KG/M2 | RESPIRATION RATE: 20 BRPM | OXYGEN SATURATION: 100 % | WEIGHT: 178.4 LBS | DIASTOLIC BLOOD PRESSURE: 53 MMHG | TEMPERATURE: 98.1 F | HEIGHT: 70 IN | HEART RATE: 116 BPM | SYSTOLIC BLOOD PRESSURE: 107 MMHG

## 2020-11-03 DIAGNOSIS — R41.0 CONFUSION: Primary | ICD-10-CM

## 2020-11-03 LAB
ALBUMIN SERPL-MCNC: 4.6 G/DL (ref 3.5–5.2)
ALBUMIN/GLOB SERPL: 2.2 G/DL
ALP SERPL-CCNC: 52 U/L (ref 39–117)
ALT SERPL W P-5'-P-CCNC: 24 U/L (ref 1–41)
AMPHET+METHAMPHET UR QL: NEGATIVE
AMPHETAMINES UR QL: NEGATIVE
ANION GAP SERPL CALCULATED.3IONS-SCNC: 11 MMOL/L (ref 5–15)
AST SERPL-CCNC: 20 U/L (ref 1–40)
BARBITURATES UR QL SCN: NEGATIVE
BASOPHILS # BLD AUTO: 0.01 10*3/MM3 (ref 0–0.2)
BASOPHILS NFR BLD AUTO: 0.2 % (ref 0–1.5)
BENZODIAZ UR QL SCN: NEGATIVE
BILIRUB SERPL-MCNC: 0.6 MG/DL (ref 0–1.2)
BILIRUB UR QL STRIP: NEGATIVE
BUN SERPL-MCNC: 15 MG/DL (ref 6–20)
BUN/CREAT SERPL: 13.3 (ref 7–25)
BUPRENORPHINE SERPL-MCNC: NEGATIVE NG/ML
CALCIUM SPEC-SCNC: 9.5 MG/DL (ref 8.6–10.5)
CANNABINOIDS SERPL QL: POSITIVE
CHLORIDE SERPL-SCNC: 102 MMOL/L (ref 98–107)
CK SERPL-CCNC: 91 U/L (ref 20–200)
CLARITY UR: CLEAR
CO2 SERPL-SCNC: 26 MMOL/L (ref 22–29)
COCAINE UR QL: NEGATIVE
COLOR UR: YELLOW
CREAT SERPL-MCNC: 1.13 MG/DL (ref 0.76–1.27)
DEPRECATED RDW RBC AUTO: 38.4 FL (ref 37–54)
EOSINOPHIL # BLD AUTO: 0.04 10*3/MM3 (ref 0–0.4)
EOSINOPHIL NFR BLD AUTO: 0.6 % (ref 0.3–6.2)
ERYTHROCYTE [DISTWIDTH] IN BLOOD BY AUTOMATED COUNT: 11.9 % (ref 12.3–15.4)
ETHANOL BLD-MCNC: <10 MG/DL (ref 0–10)
ETHANOL UR QL: <0.01 %
GFR SERPL CREATININE-BSD FRML MDRD: 78 ML/MIN/1.73
GLOBULIN UR ELPH-MCNC: 2.1 GM/DL
GLUCOSE SERPL-MCNC: 187 MG/DL (ref 65–99)
GLUCOSE UR STRIP-MCNC: NEGATIVE MG/DL
HCT VFR BLD AUTO: 43.9 % (ref 37.5–51)
HGB BLD-MCNC: 15.4 G/DL (ref 13–17.7)
HGB UR QL STRIP.AUTO: NEGATIVE
HOLD SPECIMEN: NORMAL
IMM GRANULOCYTES # BLD AUTO: 0.02 10*3/MM3 (ref 0–0.05)
IMM GRANULOCYTES NFR BLD AUTO: 0.3 % (ref 0–0.5)
KETONES UR QL STRIP: NEGATIVE
LEUKOCYTE ESTERASE UR QL STRIP.AUTO: NEGATIVE
LYMPHOCYTES # BLD AUTO: 1.63 10*3/MM3 (ref 0.7–3.1)
LYMPHOCYTES NFR BLD AUTO: 25.5 % (ref 19.6–45.3)
MAGNESIUM SERPL-MCNC: 1.6 MG/DL (ref 1.6–2.6)
MCH RBC QN AUTO: 30.9 PG (ref 26.6–33)
MCHC RBC AUTO-ENTMCNC: 35.1 G/DL (ref 31.5–35.7)
MCV RBC AUTO: 88 FL (ref 79–97)
METHADONE UR QL SCN: NEGATIVE
MONOCYTES # BLD AUTO: 0.55 10*3/MM3 (ref 0.1–0.9)
MONOCYTES NFR BLD AUTO: 8.6 % (ref 5–12)
NEUTROPHILS NFR BLD AUTO: 4.13 10*3/MM3 (ref 1.7–7)
NEUTROPHILS NFR BLD AUTO: 64.8 % (ref 42.7–76)
NITRITE UR QL STRIP: NEGATIVE
NRBC BLD AUTO-RTO: 0 /100 WBC (ref 0–0.2)
OPIATES UR QL: NEGATIVE
OXYCODONE UR QL SCN: NEGATIVE
PCP UR QL SCN: NEGATIVE
PH UR STRIP.AUTO: 6 [PH] (ref 5–9)
PLATELET # BLD AUTO: 189 10*3/MM3 (ref 140–450)
PMV BLD AUTO: 10.3 FL (ref 6–12)
POTASSIUM SERPL-SCNC: 3.5 MMOL/L (ref 3.5–5.2)
PROPOXYPH UR QL: NEGATIVE
PROT SERPL-MCNC: 6.7 G/DL (ref 6–8.5)
PROT UR QL STRIP: NEGATIVE
RBC # BLD AUTO: 4.99 10*6/MM3 (ref 4.14–5.8)
SODIUM SERPL-SCNC: 139 MMOL/L (ref 136–145)
SP GR UR STRIP: 1.02 (ref 1–1.03)
TRICYCLICS UR QL SCN: NEGATIVE
TSH SERPL DL<=0.05 MIU/L-ACNC: 1.59 UIU/ML (ref 0.27–4.2)
UROBILINOGEN UR QL STRIP: NORMAL
WBC # BLD AUTO: 6.38 10*3/MM3 (ref 3.4–10.8)
WHOLE BLOOD HOLD SPECIMEN: NORMAL

## 2020-11-03 PROCEDURE — 93005 ELECTROCARDIOGRAM TRACING: CPT

## 2020-11-03 PROCEDURE — 93005 ELECTROCARDIOGRAM TRACING: CPT | Performed by: FAMILY MEDICINE

## 2020-11-03 PROCEDURE — 81003 URINALYSIS AUTO W/O SCOPE: CPT

## 2020-11-03 PROCEDURE — 83735 ASSAY OF MAGNESIUM: CPT | Performed by: FAMILY MEDICINE

## 2020-11-03 PROCEDURE — 80053 COMPREHEN METABOLIC PANEL: CPT | Performed by: FAMILY MEDICINE

## 2020-11-03 PROCEDURE — 99284 EMERGENCY DEPT VISIT MOD MDM: CPT

## 2020-11-03 PROCEDURE — 93010 ELECTROCARDIOGRAM REPORT: CPT | Performed by: INTERNAL MEDICINE

## 2020-11-03 PROCEDURE — 82550 ASSAY OF CK (CPK): CPT | Performed by: FAMILY MEDICINE

## 2020-11-03 PROCEDURE — 84443 ASSAY THYROID STIM HORMONE: CPT | Performed by: FAMILY MEDICINE

## 2020-11-03 PROCEDURE — 72125 CT NECK SPINE W/O DYE: CPT

## 2020-11-03 PROCEDURE — 70450 CT HEAD/BRAIN W/O DYE: CPT

## 2020-11-03 PROCEDURE — 85025 COMPLETE CBC W/AUTO DIFF WBC: CPT

## 2020-11-03 PROCEDURE — 80307 DRUG TEST PRSMV CHEM ANLYZR: CPT | Performed by: FAMILY MEDICINE

## 2020-11-03 RX ORDER — SODIUM CHLORIDE 0.9 % (FLUSH) 0.9 %
10 SYRINGE (ML) INJECTION AS NEEDED
Status: DISCONTINUED | OUTPATIENT
Start: 2020-11-03 | End: 2020-11-03 | Stop reason: HOSPADM

## 2020-11-03 RX ORDER — SODIUM CHLORIDE 9 MG/ML
125 INJECTION, SOLUTION INTRAVENOUS CONTINUOUS
Status: DISCONTINUED | OUTPATIENT
Start: 2020-11-03 | End: 2020-11-03 | Stop reason: HOSPADM

## 2020-11-03 RX ADMIN — SODIUM CHLORIDE 1000 ML: 9 INJECTION, SOLUTION INTRAVENOUS at 13:44

## 2020-11-03 RX ADMIN — SODIUM CHLORIDE 1000 ML: 9 INJECTION, SOLUTION INTRAVENOUS at 14:58

## 2020-11-03 NOTE — ED NOTES
"In room to check on pt, he has thrown the c-collar off, stating he does not need and he will not wear it because there is nothing physically wrong with him and he did not fall off the ladder.  He states he had an episode similar to this 6 years ago but is unable to really states what happened.  He states today he was going up his ladder, his heart started racing and something in his head went \"boom\" and he states he became unconscious. Pt states he remembers climbing back down the ladder and did not fall     Kenzie Johnson, RN  11/03/20 1317    "

## 2020-11-03 NOTE — ED NOTES
"Pt refusing to wear C collar. States \"nothing is broke and that thing is freaking me out.\"      Jesse Partida RN  11/03/20 [id number]    "

## 2020-11-03 NOTE — ED PROVIDER NOTES
"Subjective   The patient's wife states that he was at work today on a ladder, when he became confused, lasting roughly 20 minutes.  He then descended from the ladder down to the ground without difficulty and laid on the ground and called for help.  Patient states that 5 years ago he injected himself with some \"Chinese testosterone,\" and has never been the same since.  He has been evaluated by neurology in [place] and has had MRI imaging, with no definitive diagnosis.  He states that the symptoms he is having today is chronic.      Altered Mental Status  Presenting symptoms: no confusion    Severity:  Moderate  Most recent episode:  Today  Duration:  3 hours  Associated symptoms: no fever, no light-headedness, no rash and no weakness        Review of Systems   Constitutional: Negative for appetite change, chills, diaphoresis, fatigue and fever.   HENT: Negative for congestion, ear discharge, ear pain, nosebleeds, rhinorrhea, sinus pressure, sore throat and trouble swallowing.    Eyes: Negative for discharge and redness.   Respiratory: Negative for apnea, cough, chest tightness, shortness of breath and wheezing.    Gastrointestinal: Negative for diarrhea.   Endocrine: Negative for polyuria.   Genitourinary: Negative for dysuria, frequency and urgency.   Musculoskeletal: Negative for myalgias.   Skin: Negative for color change and rash.   Allergic/Immunologic: Negative for immunocompromised state.   Neurological: Negative for dizziness, syncope, weakness and light-headedness.   Hematological: Negative for adenopathy. Does not bruise/bleed easily.   Psychiatric/Behavioral: Negative for behavioral problems and confusion.   All other systems reviewed and are negative.      Past Medical History:   Diagnosis Date   • ADHD (attention deficit hyperactivity disorder)    • Ankle joint pain    • Bipolar disorder (CMS/HCC)    • Burning feet syndrome    • Depressive disorder    • Dysuria     Dysuria - OVER ACTIVE BLADDER VS " POLYURIA      • Encounter for general adult medical examination without abnormal findings    • Heart murmur    • Infertile male syndrome    • Intervertebral disc disorders with radiculopathy, lumbar region    • Low back pain    • Male hypogonadism     Male hypogonadism - not receiving testosterone replacement by urology      • Memory loss    • Pain in unspecified foot    • Palpitations        No Known Allergies    Past Surgical History:   Procedure Laterality Date   • COLONOSCOPY N/A 4/19/2019    Procedure: COLONOSCOPY;  Surgeon: Alonso Goodson MD;  Location: [facility] ENDOSCOPY;  Service: Gastroenterology   • ENDOSCOPY N/A 4/19/2019    Procedure: ESOPHAGOGASTRODUODENOSCOPY;  Surgeon: Alonso Goodson MD;  Location: [facility] ENDOSCOPY;  Service: Gastroenterology   • INJECTION OF MEDICATION      Kenalog (1)      10/05/2015       • UPPER GASTROINTESTINAL ENDOSCOPY  04/19/2019       Family History   Problem Relation Age of Onset   • Asthma Other    • Diabetes Other    • Heart disease Other    • Hypertension Other    • Lung disease Other    • Coronary artery disease Father    • Hypertension Father    • Liver disease Mother        Social History     Socioeconomic History   • Marital status:      Spouse name: Not on file   • Number of children: Not on file   • Years of education: Not on file   • Highest education level: Not on file   Tobacco Use   • Smoking status: Never Smoker   • Smokeless tobacco: Former User   Substance and Sexual Activity   • Alcohol use: No   • Drug use: No   • Sexual activity: Yes     Partners: Female           Objective   Physical Exam  Vitals signs and nursing note reviewed.   Constitutional:       Appearance: He is well-developed.   HENT:      Head: Normocephalic and atraumatic.      Nose: Nose normal.   Eyes:      General: No scleral icterus.        Right eye: No discharge.         Left eye: No discharge.      Conjunctiva/sclera: Conjunctivae normal.      Pupils: Pupils are equal, round,  and reactive to light.   Neck:      Musculoskeletal: Normal range of motion and neck supple.      Trachea: No tracheal deviation.   Cardiovascular:      Rate and Rhythm: Regular rhythm. Tachycardia present.      Heart sounds: Normal heart sounds. No murmur.   Pulmonary:      Effort: Pulmonary effort is normal. No respiratory distress.      Breath sounds: Normal breath sounds. No stridor. No wheezing or rales.   Abdominal:      General: Bowel sounds are normal. There is no distension.      Palpations: Abdomen is soft. There is no mass.      Tenderness: There is no abdominal tenderness. There is no guarding or rebound.   Skin:     General: Skin is warm and dry.      Findings: No erythema or rash.   Neurological:      Mental Status: He is alert and oriented to person, place, and time.      Coordination: Coordination normal.   Psychiatric:         Behavior: Behavior normal.         Thought Content: Thought content normal.         ECG 12 Lead      Date/Time: 11/3/2020 3:19 PM  Performed by: Davon Peck MD  Authorized by: Davon Peck MD   Interpreted by physician  Rhythm: sinus tachycardia  Rate: tachycardic  BPM: 122  ST Segments: ST segments normal                   ED Course  ED Course as of Nov 08 0815   Sun Nov 08, 2020 0814 Patient was seen in the emergency department by the family medicine resident, Dr. Brown, who has evaluated patient and arranged for follow-up in clinic with either himself, or the patient's primary care provider, Dr. Anna Fuller.  Patient agrees with discharge plans.  The appointment has been made for patient to see Dr. Ashford tomorrow in clinic.    [CB]      ED Course User Index  [CB] Davon Peck MD                   Labs Reviewed   COMPREHENSIVE METABOLIC PANEL - Abnormal; Notable for the following components:       Result Value    Glucose 187 (*)     All other components within normal limits    Narrative:     GFR Normal >60  Chronic Kidney Disease <60  Kidney  Failure <15     URINE DRUG SCREEN - Abnormal; Notable for the following components:    THC, Screen, Urine Positive (*)     All other components within normal limits    Narrative:     Cutoff For Drugs Screened:    Amphetamines               500 ng/ml  Barbiturates               200 ng/ml  Benzodiazepines            150 ng/ml  Cocaine                    150 ng/ml  Methadone                  200 ng/ml  Opiates                    100 ng/ml  Phencyclidine               25 ng/ml  THC                            50 ng/ml  Methamphetamine            500 ng/ml  Tricyclic Antidepressants  300 ng/ml  Oxycodone                  100 ng/ml  Propoxyphene               300 ng/ml  Buprenorphine               10 ng/ml    The normal value for all drugs tested is negative. This report includes unconfirmed screening results, with the cutoff values listed, to be used for medical treatment purposes only.  Unconfirmed results must not be used for non-medical purposes such as employment or legal testing.  Clinical consideration should be applied to any drug of abuse test, particularly when unconfirmed results are used.     CBC WITH AUTO DIFFERENTIAL - Abnormal; Notable for the following components:    RDW 11.9 (*)     All other components within normal limits   URINALYSIS W/ MICROSCOPIC IF INDICATED (NO CULTURE) - Normal    Narrative:     Urine microscopic not indicated.   CK - Normal   MAGNESIUM - Normal   TSH - Normal   RAINBOW DRAW    Narrative:     The following orders were created for panel order [place] Draw.  Procedure                               Abnormality         Status                     ---------                               -----------         ------                     Light Blue Top[[id number]]                                                              Green Top (Gel)[[id number]]                                  Final result               Lavender Top[[id number]]                                     Final result                Gold Top - SST[[id number]]                                                                Please view results for these tests on the individual orders.   ETHANOL   CBC AND DIFFERENTIAL    Narrative:     The following orders were created for panel order CBC & Differential.  Procedure                               Abnormality         Status                     ---------                               -----------         ------                     CBC Auto Differential[[id number]]        Abnormal            Final result                 Please view results for these tests on the individual orders.   GREEN TOP   LAVENDER TOP       CT Head Without Contrast   Final Result   1. Unremarkable unenhanced CT scan of the brain.      Electronically signed by:  Arlin Lloyd MD  11/3/2020 1:08 PM   CST Workstation: [id number]      CT Cervical Spine Without Contrast   Final Result   1. Unremarkable CT of the cervical spine without evidence of an   acute fracture.      Electronically signed by:  Arlin Lloyd MD  11/3/2020 1:46 PM   CST Workstation: [id number]                                       [facility]    Final diagnoses:   Confusion            Davon Peck MD  11/08/20 0814       Davon Peck MD  11/08/20 0815

## 2020-11-04 ENCOUNTER — LAB (OUTPATIENT)
Dept: LAB | Facility: HOSPITAL | Age: 25
End: 2020-11-04

## 2020-11-04 ENCOUNTER — OFFICE VISIT (OUTPATIENT)
Dept: FAMILY MEDICINE CLINIC | Facility: CLINIC | Age: 25
End: 2020-11-04

## 2020-11-04 VITALS
BODY MASS INDEX: 24.89 KG/M2 | HEART RATE: 82 BPM | TEMPERATURE: 98 F | DIASTOLIC BLOOD PRESSURE: 78 MMHG | SYSTOLIC BLOOD PRESSURE: 120 MMHG | WEIGHT: 173.5 LBS | OXYGEN SATURATION: 100 %

## 2020-11-04 DIAGNOSIS — R41.3 MEMORY LOSS OR IMPAIRMENT: ICD-10-CM

## 2020-11-04 DIAGNOSIS — Z09 FOLLOW UP: ICD-10-CM

## 2020-11-04 DIAGNOSIS — R41.3 MEMORY LOSS OR IMPAIRMENT: Primary | ICD-10-CM

## 2020-11-04 PROCEDURE — 84403 ASSAY OF TOTAL TESTOSTERONE: CPT

## 2020-11-04 PROCEDURE — 84439 ASSAY OF FREE THYROXINE: CPT

## 2020-11-04 PROCEDURE — 85652 RBC SED RATE AUTOMATED: CPT | Performed by: STUDENT IN AN ORGANIZED HEALTH CARE EDUCATION/TRAINING PROGRAM

## 2020-11-04 PROCEDURE — 36415 COLL VENOUS BLD VENIPUNCTURE: CPT | Performed by: STUDENT IN AN ORGANIZED HEALTH CARE EDUCATION/TRAINING PROGRAM

## 2020-11-04 PROCEDURE — 82652 VIT D 1 25-DIHYDROXY: CPT

## 2020-11-04 PROCEDURE — 86376 MICROSOMAL ANTIBODY EACH: CPT | Performed by: STUDENT IN AN ORGANIZED HEALTH CARE EDUCATION/TRAINING PROGRAM

## 2020-11-04 PROCEDURE — 84445 ASSAY OF TSI GLOBULIN: CPT

## 2020-11-04 PROCEDURE — 99213 OFFICE O/P EST LOW 20 MIN: CPT | Performed by: STUDENT IN AN ORGANIZED HEALTH CARE EDUCATION/TRAINING PROGRAM

## 2020-11-04 PROCEDURE — 86140 C-REACTIVE PROTEIN: CPT

## 2020-11-04 PROCEDURE — 86038 ANTINUCLEAR ANTIBODIES: CPT

## 2020-11-04 PROCEDURE — 84402 ASSAY OF FREE TESTOSTERONE: CPT

## 2020-11-04 PROCEDURE — 82607 VITAMIN B-12: CPT

## 2020-11-04 NOTE — PROGRESS NOTES
ID: Antione Villaseñor    CC:   Chief Complaint   Patient presents with   • Dizziness     ER followup        Subjective:     HPI     Antione Villaseñor is a 25 y.o. male who presents for dizziness. Patient went to the er yesterday because he felt dizzy and then passed out. Patient states all day he couldn't stay awake. This same incident happened five years ago. At that time, patient states the only thing that changed is he took testosterone. He got it from someone on the street. Patient has no idea what he really took but sine that day he has never been the same. Patient states he has this chronic brain fog for the past five years. He has no chest pain, nausea, vomiting, diarrhea, constipation. He does no drugs, except for cbd oil. But he started that two months ago to help with these symptoms. Patient states he feels better when he doesn't eat or when he doesn't take caffeine. Patient also does worse when he doesn't sleep well. Several studies have been done and he has seen a neurologist, but nothing was found.     Preventative:  Over the past 2 weeks, have you felt down, depressed, or hopeless? no  Over the past 2 weeks, have you felt little interest or pleasure in doing things? no  Clinical depression screening refused by patient no    On osteoporosis therapy? no    Past Medical Hx:  Past Medical History:   Diagnosis Date   • ADHD (attention deficit hyperactivity disorder)    • Ankle joint pain    • Bipolar disorder (CMS/[facility])    • Burning feet syndrome    • Depressive disorder    • Dysuria     Dysuria - OVER ACTIVE BLADDER VS POLYURIA      • Encounter for general adult medical examination without abnormal findings    • Heart murmur    • Infertile male syndrome    • Intervertebral disc disorders with radiculopathy, lumbar region    • Low back pain    • Male hypogonadism     Male hypogonadism - not receiving testosterone replacement by urology      • Memory loss    • Pain in unspecified foot    • Palpitations         Past Surgical Hx:  Past Surgical History:   Procedure Laterality Date   • COLONOSCOPY N/A 4/19/2019    Procedure: COLONOSCOPY;  Surgeon: Alonso Goodson MD;  Location: [facility] ENDOSCOPY;  Service: Gastroenterology   • ENDOSCOPY N/A 4/19/2019    Procedure: ESOPHAGOGASTRODUODENOSCOPY;  Surgeon: Alonso Goodson MD;  Location: [facility] ENDOSCOPY;  Service: Gastroenterology   • INJECTION OF MEDICATION      Kenalog (1)      10/05/2015       • UPPER GASTROINTESTINAL ENDOSCOPY  04/19/2019       Health Maintenance:  Health Maintenance   Topic Date Due   • ANNUAL PHYSICAL  06/08/2019   • INFLUENZA VACCINE  08/01/2020   • TDAP/TD VACCINES (4 - Td) 01/01/2021   • HEPATITIS C SCREENING  Completed   • Pneumococcal Vaccine 0-64  Aged Out       Current Meds:  No current outpatient medications on file.  No current facility-administered medications for this visit.     Allergies:  Patient has no known allergies.    Family Hx:  Family History   Problem Relation Age of Onset   • Asthma Other    • Diabetes Other    • Heart disease Other    • Hypertension Other    • Lung disease Other    • Coronary artery disease Father    • Hypertension Father    • Liver disease Mother         Social History:  Social History     Socioeconomic History   • Marital status:      Spouse name: Not on file   • Number of children: Not on file   • Years of education: Not on file   • Highest education level: Not on file   Tobacco Use   • Smoking status: Never Smoker   • Smokeless tobacco: Former User   Substance and Sexual Activity   • Alcohol use: No   • Drug use: No   • Sexual activity: Yes     Partners: Female       Review of Systems   Constitutional: Negative for activity change, appetite change, chills, fatigue and fever.   HENT: Negative for drooling, ear discharge, ear pain, facial swelling, hearing loss, mouth sores, rhinorrhea and sinus pain.    Eyes: Negative for pain, redness and itching.   Respiratory: Negative for cough, choking, chest  tightness, shortness of breath and stridor.    Cardiovascular: Negative for chest pain, palpitations and leg swelling.   Gastrointestinal: Negative for abdominal distention, abdominal pain, anal bleeding, blood in stool, constipation, diarrhea and nausea.   Endocrine: Negative for heat intolerance, polydipsia and polyphagia.   Genitourinary: Negative for dysuria, flank pain, frequency and genital sores.   Musculoskeletal: Negative for back pain, gait problem, joint swelling and myalgias.   Skin: Negative for pallor and rash.   Neurological: Negative for seizures, facial asymmetry, speech difficulty, light-headedness, numbness and headaches.   Hematological: Negative for adenopathy. Does not bruise/bleed easily.   Psychiatric/Behavioral: Negative for confusion, decreased concentration, dysphoric mood and hallucinations. The patient is not nervous/anxious and is not hyperactive.         Brain fog           Objective:     /78   Pulse 82   Temp 98 °F (36.7 °C)   Wt 78.7 kg (173 lb 8 oz)   SpO2 100%   BMI 24.89 kg/m²     Physical Exam  Constitutional:       Appearance: He is well-developed.   HENT:      Head: Normocephalic and atraumatic.      Right Ear: External ear normal.      Left Ear: External ear normal.      Nose: Nose normal.   Eyes:      Conjunctiva/sclera: Conjunctivae normal.      Pupils: Pupils are equal, round, and reactive to light.   Neck:      Musculoskeletal: Normal range of motion and neck supple.   Cardiovascular:      Rate and Rhythm: Normal rate and regular rhythm.      Heart sounds: Normal heart sounds.   Pulmonary:      Effort: Pulmonary effort is normal.      Breath sounds: Normal breath sounds.   Abdominal:      General: Bowel sounds are normal.      Palpations: Abdomen is soft.   Musculoskeletal: Normal range of motion.   Skin:     General: Skin is warm and dry.   Neurological:      Mental Status: He is alert and oriented to person, place, and time.   Psychiatric:         Behavior:  Behavior normal.         Thought Content: Thought content normal.         Judgment: Judgment normal.                Assessment/Plan:     Diagnoses and all orders for this visit:    1. Memory loss or impairment (Primary)  -     Testosterone, free, total; Future  -     Vitamin B12; Future  -     Vitamin D 1,25 dihydroxy; Future  -     T4, free; Future  -     Sedimentation Rate  -     C-reactive protein; Future  -     VASQUEZ by IFA, Reflex 9-biomarkers profile; Future  -     Thyroid Stimulating Immunoglobulin; Future  -     Thyroid Peroxidase Antibody      Will order various tests to see what could be the cause of patients brain fog and yesterdays loss of consciousness. Patient states he does keep himself very well hydrated, so I dont believe its orthostatic hypotension that caused his loss of consciousness. Once results are back, we will see if a 24 hour holter needs to be preformed.   Follow-up:     1 month    Goals:   Goals     • Patient reports that he would like to have improvment of his memory.  (pt-stated)      Barriers to goal: None          Barriers to goals: none    Health Maintenance   Topic Date Due   • ANNUAL PHYSICAL  06/08/2019   • INFLUENZA VACCINE  08/01/2020   • TDAP/TD VACCINES (4 - Td) 01/01/2021   • HEPATITIS C SCREENING  Completed   • Pneumococcal Vaccine 0-64  Aged Out       Tobacco: nonsmoker  Alcohol: does not drink  Lifestyle: Patient's Body mass index is 24.89 kg/m². BMI is within normal parameters. No follow-up required..   increase physical activity, cut out extra servings, reduce fast food intake and keep TV off during meals    RISK SCORE: 3         Anna Quick M.D. PGY3  [facility]  [address] [place] [address]  Office: [phone]    This document has been electronically signed by Anna Quick MD on November 4, 2020 15:36 CST            This document has been electronically signed by Anna Quick MD on November 4, 2020 15:29 CST

## 2020-11-05 ENCOUNTER — TELEPHONE (OUTPATIENT)
Dept: FAMILY MEDICINE CLINIC | Facility: CLINIC | Age: 25
End: 2020-11-05

## 2020-11-05 LAB
CRP SERPL-MCNC: 0.04 MG/DL (ref 0–0.5)
ERYTHROCYTE [SEDIMENTATION RATE] IN BLOOD: 2 MM/HR (ref 0–15)
T4 FREE SERPL-MCNC: 1.1 NG/DL (ref 0.93–1.7)
VIT B12 BLD-MCNC: 565 PG/ML (ref 211–946)

## 2020-11-05 NOTE — PROGRESS NOTES
I have reviewed the patient.  I have reviewed the notes, assessments, and/or procedures performed by Dr Anna Quick, I concur with her  documentation and assessment and plan for Antione Villaseñor.          This document has been electronically signed by Orlando Fournier MD on November 5, 2020 09:30 CST

## 2020-11-05 NOTE — TELEPHONE ENCOUNTER
PATIENT CALLED AND SAW DR MOISÉS DUBOIS THE OTHER DAY AND HE IS NEEDING  WORK EXCUSE STATING THAT HE CAN GO BACK TO WORK ON Saturday ON 11/07/2020. HIS NUMBER TO CALL BACK [phone].        THANK YOU,      LIZY

## 2020-11-06 LAB — THYROPEROXIDASE AB SERPL-ACNC: <9 IU/ML (ref 0–34)

## 2020-11-07 LAB
TESTOST FREE SERPL-MCNC: 6 PG/ML (ref 9.3–26.5)
TESTOST SERPL-MCNC: 209 NG/DL (ref 264–916)
TSI SER-ACNC: <0.1 IU/L (ref 0–0.55)

## 2020-11-09 ENCOUNTER — TELEPHONE (OUTPATIENT)
Dept: FAMILY MEDICINE CLINIC | Facility: CLINIC | Age: 25
End: 2020-11-09

## 2020-11-09 DIAGNOSIS — R79.89 LOW TESTOSTERONE IN MALE: Primary | ICD-10-CM

## 2020-11-09 LAB
1,25(OH)2D3 SERPL-MCNC: 27.3 PG/ML (ref 19.9–79.3)
ANA TITR SER IF: NEGATIVE {TITER}
LABORATORY COMMENT REPORT: NORMAL

## 2020-11-09 NOTE — TELEPHONE ENCOUNTER
Called to let patient know about his testosterone levels, and also let him know about his work letter he was needing. He has an appointment with Dr. Scanlon today at 1:45PM

## 2020-11-15 LAB
QT INTERVAL: 290 MS
QTC INTERVAL: 413 MS

## 2020-11-16 DIAGNOSIS — R79.89 LOW TESTOSTERONE: Primary | ICD-10-CM

## 2021-03-09 PROCEDURE — 87086 URINE CULTURE/COLONY COUNT: CPT | Performed by: FAMILY MEDICINE

## 2021-03-09 PROCEDURE — 87661 TRICHOMONAS VAGINALIS AMPLIF: CPT | Performed by: FAMILY MEDICINE

## 2021-03-09 PROCEDURE — 87591 N.GONORRHOEAE DNA AMP PROB: CPT | Performed by: FAMILY MEDICINE

## 2021-03-09 PROCEDURE — 87109 MYCOPLASMA: CPT | Performed by: FAMILY MEDICINE

## 2021-03-09 PROCEDURE — 87491 CHLMYD TRACH DNA AMP PROBE: CPT | Performed by: FAMILY MEDICINE

## 2021-03-10 PROCEDURE — 87635 SARS-COV-2 COVID-19 AMP PRB: CPT | Performed by: NURSE PRACTITIONER

## 2021-03-11 ENCOUNTER — OFFICE VISIT (OUTPATIENT)
Dept: FAMILY MEDICINE CLINIC | Facility: CLINIC | Age: 26
End: 2021-03-11

## 2021-03-11 VITALS
HEIGHT: 71 IN | SYSTOLIC BLOOD PRESSURE: 134 MMHG | TEMPERATURE: 98.2 F | BODY MASS INDEX: 24.79 KG/M2 | WEIGHT: 177.1 LBS | OXYGEN SATURATION: 96 % | HEART RATE: 103 BPM | DIASTOLIC BLOOD PRESSURE: 80 MMHG

## 2021-03-11 DIAGNOSIS — F32.0 CURRENT MILD EPISODE OF MAJOR DEPRESSIVE DISORDER, UNSPECIFIED WHETHER RECURRENT (HCC): Primary | ICD-10-CM

## 2021-03-11 DIAGNOSIS — R55 SYNCOPE, UNSPECIFIED SYNCOPE TYPE: Primary | ICD-10-CM

## 2021-03-11 PROCEDURE — 99213 OFFICE O/P EST LOW 20 MIN: CPT | Performed by: STUDENT IN AN ORGANIZED HEALTH CARE EDUCATION/TRAINING PROGRAM

## 2021-03-11 RX ORDER — ESCITALOPRAM OXALATE 10 MG/1
10 TABLET ORAL DAILY
Qty: 30 TABLET | Refills: 0 | Status: SHIPPED | OUTPATIENT
Start: 2021-03-11 | End: 2021-03-15
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
## 2021-03-15 ENCOUNTER — OFFICE VISIT (OUTPATIENT)
Dept: FAMILY MEDICINE CLINIC | Facility: CLINIC | Age: 26
End: 2021-03-15

## 2021-03-15 VITALS
HEIGHT: 71 IN | BODY MASS INDEX: 25.05 KG/M2 | TEMPERATURE: 98.7 F | HEART RATE: 73 BPM | WEIGHT: 178.9 LBS | DIASTOLIC BLOOD PRESSURE: 72 MMHG | OXYGEN SATURATION: 99 % | RESPIRATION RATE: 20 BRPM | SYSTOLIC BLOOD PRESSURE: 148 MMHG

## 2021-03-15 DIAGNOSIS — Z76.89 ENCOUNTER TO ESTABLISH CARE: ICD-10-CM

## 2021-03-15 DIAGNOSIS — Z00.00 ANNUAL PHYSICAL EXAM: Primary | ICD-10-CM

## 2021-03-15 DIAGNOSIS — F32.1 CURRENT MODERATE EPISODE OF MAJOR DEPRESSIVE DISORDER, UNSPECIFIED WHETHER RECURRENT (HCC): Primary | ICD-10-CM

## 2021-03-15 DIAGNOSIS — R41.89 EPISODE OF ALTERED COGNITION: ICD-10-CM

## 2021-03-15 DIAGNOSIS — Z23 NEED FOR INFLUENZA VACCINATION: ICD-10-CM

## 2021-03-15 DIAGNOSIS — F31.31 BIPOLAR AFFECTIVE DISORDER, CURRENTLY DEPRESSED, MILD (HCC): ICD-10-CM

## 2021-03-15 DIAGNOSIS — E16.2 HYPOGLYCEMIA: ICD-10-CM

## 2021-03-15 PROCEDURE — 99214 OFFICE O/P EST MOD 30 MIN: CPT | Performed by: NURSE PRACTITIONER

## 2021-03-15 PROCEDURE — 90471 IMMUNIZATION ADMIN: CPT | Performed by: NURSE PRACTITIONER

## 2021-03-15 PROCEDURE — 90686 IIV4 VACC NO PRSV 0.5 ML IM: CPT | Performed by: NURSE PRACTITIONER

## 2021-03-15 RX ORDER — BUPROPION HYDROCHLORIDE 100 MG/1
100 TABLET, EXTENDED RELEASE ORAL 2 TIMES DAILY
Qty: 60 TABLET | Refills: 0 | Status: SHIPPED | OUTPATIENT
Start: 2021-03-15 | End: 2021-04-12 | Stop reason: DRUGHIGH

## 2021-03-15 NOTE — PROGRESS NOTES
"Subjective   Antione Villaseñor is a 25 y.o. male.     CC: Establish care-bipolar depression, altered cognition, hypoglycemia    Depression  Visit Type: initial  Onset of symptoms: more than 5 years ago  Progression since onset: waxing and waning  Patient presents with the following symptoms: decreased concentration and depressed mood.  Patient is not experiencing: anhedonia, confusion, feelings of hopelessness, feelings of worthlessness, insomnia, irritability, nervousness/anxiety, palpitations, panic, restlessness, shortness of breath, suicidal ideas, suicidal planning, thoughts of death, weight gain and weight loss.  Frequency of symptoms: occasionally   Severity: mild   Exacerbated by: health issues.  Sleep quality: fair  Nighttime awakenings: occasional  Risk factors: previous episode of depression  Patient has a history of: bipolar disorder  Treatment tried: SSRI  Compliance with treatment: good  Improvement on treatment: no relief  Past compliance problems: medication issues      Neurologic Problem  The patient's pertinent negatives include no syncope or weakness. Primary symptoms comment: \"mental fog\" x 6 years. This is a chronic problem. The current episode started more than 1 year ago. The neurological problem developed suddenly. The problem is unchanged. There was no focality noted. Pertinent negatives include no abdominal pain, auditory change, aura, back pain, bladder incontinence, bowel incontinence, chest pain, confusion, diaphoresis, dizziness, fatigue, fever, headaches, light-headedness, nausea, neck pain, palpitations, shortness of breath, vertigo or vomiting. Past treatments include nothing. The treatment provided no relief. There is no history of a CVA or seizures.   Blood Sugar Problem  This is a new problem. The current episode started more than 1 month ago. The problem occurs intermittently. The problem has been waxing and waning. Pertinent negatives include no abdominal pain, change in bowel " habit, chest pain, chills, congestion, coughing, diaphoresis, fatigue, fever, headaches, myalgias, nausea, neck pain, numbness, rash, sore throat, vertigo, vomiting or weakness. The symptoms are aggravated by eating. He has tried eating for the symptoms. The treatment provided significant relief.        The following portions of the patient's history were reviewed and updated as appropriate: allergies, current medications, past family history, past medical history, past social history, past surgical history and problem list.    Review of Systems   Constitutional: Negative for activity change, appetite change, chills, diaphoresis, fatigue, fever, irritability, unexpected weight gain and unexpected weight loss.   HENT: Negative for congestion, sore throat, trouble swallowing and voice change.    Eyes: Negative for blurred vision, double vision, photophobia, pain and visual disturbance.   Respiratory: Negative for cough, chest tightness, shortness of breath and wheezing.    Cardiovascular: Negative for chest pain, palpitations and leg swelling.   Gastrointestinal: Negative for abdominal distention, abdominal pain, anal bleeding, blood in stool, bowel incontinence, change in bowel habit, constipation, diarrhea, nausea, vomiting, GERD and indigestion.   Endocrine: Negative for cold intolerance, heat intolerance, polydipsia, polyphagia and polyuria.        Reports hypoglycemic episodes over the last month with high carb/sugar meals.  Reports blood sugar as low as 55.  Patient has noted a correlation that if he eats less carbs he does not have hypoglycemia.  Reports hypoglycemia only occurs after high carb meals.   Genitourinary: Negative for dysuria, hematuria, urgency and urinary incontinence.   Musculoskeletal: Negative for back pain, myalgias and neck pain.   Skin: Negative for rash.   Allergic/Immunologic: Negative.    Neurological: Negative for dizziness, vertigo, tremors, seizures, syncope, facial asymmetry, speech  difficulty, weakness, light-headedness, numbness, headache, memory problem and confusion.   Hematological: Negative.    Psychiatric/Behavioral: Positive for decreased concentration, depressed mood and stress. Negative for agitation, behavioral problems, dysphoric mood, hallucinations, self-injury, sleep disturbance, suicidal ideas and negative for hyperactivity. The patient is not nervous/anxious and does not have insomnia.        Objective   Physical Exam  Vitals and nursing note reviewed.   Constitutional:       General: He is not in acute distress.     Appearance: Normal appearance. He is well-developed and normal weight. He is not ill-appearing, toxic-appearing or diaphoretic.   HENT:      Head: Normocephalic and atraumatic.   Eyes:      Conjunctiva/sclera: Conjunctivae normal.   Cardiovascular:      Rate and Rhythm: Normal rate and regular rhythm.      Heart sounds: Normal heart sounds. No murmur. No friction rub. No gallop.    Pulmonary:      Effort: Pulmonary effort is normal. No respiratory distress.      Breath sounds: Normal breath sounds. No stridor. No wheezing, rhonchi or rales.   Abdominal:      General: Bowel sounds are normal. There is no distension.      Palpations: Abdomen is soft. There is no mass.      Tenderness: There is no abdominal tenderness. There is no guarding or rebound.      Hernia: No hernia is present.   Musculoskeletal:         General: No tenderness. Normal range of motion.      Cervical back: Normal range of motion.   Skin:     General: Skin is warm and dry.      Coloration: Skin is not pale.      Findings: No erythema or rash.   Neurological:      General: No focal deficit present.      Mental Status: He is alert and oriented to person, place, and time.      Cranial Nerves: No cranial nerve deficit.      Sensory: No sensory deficit.      Motor: No weakness.      Coordination: Coordination normal.      Gait: Gait normal.   Psychiatric:         Attention and Perception: Attention  and perception normal.         Mood and Affect: Affect normal. Mood is depressed.         Speech: Speech normal.         Behavior: Behavior normal. Behavior is cooperative.         Thought Content: Thought content normal. Thought content is not paranoid or delusional. Thought content does not include homicidal or suicidal ideation. Thought content does not include homicidal or suicidal plan.         Cognition and Memory: Cognition and memory normal.         Judgment: Judgment normal.           Assessment/Plan   Diagnoses and all orders for this visit:    1. Annual physical exam (Primary)   -Recent routine labs reviewed.    2. Encounter to establish care    3. Bipolar affective disorder, currently depressed, mild (CMS/HCC)   -Previous PCP has started patient on Wellbutrin 100 mg twice daily.  Patient received prescription today.  Instructed patient to start and take as prescribed.  Instructed patient to discontinue if symptoms worsen.  Instructed patient to present to the ER for suicidal homicidal ideations.  Patient verbalized understanding of instruction.  No current suicidal homicidal ideations.    4. Need for influenza vaccination  -     FluLaval Quad >6 Months ([id number]), tolerated well with no adverse reaction.    5. Episode of altered cognition   -Previous PCP has ordered an MRI of the brain and referred to neurology.  Neurology appointment is June 1, 2021.  MRI appointment in 1 week.  Will monitor for results.  Neuro exam grossly unremarkable today.    6. Hypoglycemia  -     Hemoglobin A1c; Future  -     Insulin, Total; Future   -Will call with results.    7.  Follow-up in 1 month or sooner for any acute needs.          This document has been electronically signed by LISBETH Gutierrez on March 15, 2021 12:43 CDT

## 2021-03-16 NOTE — PROGRESS NOTES
I have reviewed the notes, assessments, and/or procedures performed by Dr. Brown, I concur with her/his documentation and assessment and plan for Antione Villaseñor.                This document has been electronically signed by Juvenal Rodgers MD on March 16, 2021 15:04 CDT

## 2021-03-18 ENCOUNTER — LAB (OUTPATIENT)
Dept: LAB | Facility: HOSPITAL | Age: 26
End: 2021-03-18

## 2021-03-18 DIAGNOSIS — R41.3 MEMORY LOSS OR IMPAIRMENT: ICD-10-CM

## 2021-03-18 DIAGNOSIS — E16.2 HYPOGLYCEMIA: ICD-10-CM

## 2021-03-18 DIAGNOSIS — R55 SYNCOPE, UNSPECIFIED SYNCOPE TYPE: Primary | ICD-10-CM

## 2021-03-18 LAB — HBA1C MFR BLD: 5.35 % (ref 4.8–5.6)

## 2021-03-18 PROCEDURE — 36415 COLL VENOUS BLD VENIPUNCTURE: CPT

## 2021-03-18 PROCEDURE — 83525 ASSAY OF INSULIN: CPT

## 2021-03-18 PROCEDURE — 83036 HEMOGLOBIN GLYCOSYLATED A1C: CPT

## 2021-03-19 ENCOUNTER — DOCUMENTATION (OUTPATIENT)
Dept: FAMILY MEDICINE CLINIC | Facility: CLINIC | Age: 26
End: 2021-03-19

## 2021-03-19 LAB — INSULIN SERPL-ACNC: 10.6 UIU/ML (ref 2.6–24.9)

## 2021-03-19 NOTE — PROGRESS NOTES
Insulin and hemoglobin A1c within normal limits.  Follow-up with specialist as scheduled.  Follow-up with me as scheduled

## 2021-03-22 ENCOUNTER — HOSPITAL ENCOUNTER (OUTPATIENT)
Dept: MRI IMAGING | Facility: HOSPITAL | Age: 26
Discharge: HOME | End: 2021-03-22

## 2021-03-22 DIAGNOSIS — R55 SYNCOPE, UNSPECIFIED SYNCOPE TYPE: ICD-10-CM

## 2021-03-22 PROCEDURE — A9579 GAD-BASE MR CONTRAST NOS,1ML: HCPCS | Performed by: STUDENT IN AN ORGANIZED HEALTH CARE EDUCATION/TRAINING PROGRAM

## 2021-03-22 PROCEDURE — 70553 MRI BRAIN STEM W/O & W/DYE: CPT

## 2021-03-22 PROCEDURE — 25010000002 GADOTERIDOL PER 1 ML: Performed by: STUDENT IN AN ORGANIZED HEALTH CARE EDUCATION/TRAINING PROGRAM

## 2021-03-22 RX ADMIN — GADOTERIDOL 18 ML: 279.3 INJECTION, SOLUTION INTRAVENOUS at 07:34

## 2021-03-29 DIAGNOSIS — F32.1 CURRENT MODERATE EPISODE OF MAJOR DEPRESSIVE DISORDER, UNSPECIFIED WHETHER RECURRENT (HCC): Primary | ICD-10-CM

## 2021-03-29 DIAGNOSIS — F31.31 BIPOLAR AFFECTIVE DISORDER, CURRENTLY DEPRESSED, MILD (HCC): ICD-10-CM

## 2021-04-12 ENCOUNTER — OFFICE VISIT (OUTPATIENT)
Dept: FAMILY MEDICINE CLINIC | Facility: CLINIC | Age: 26
End: 2021-04-12

## 2021-04-12 VITALS
BODY MASS INDEX: 24.5 KG/M2 | HEART RATE: 88 BPM | WEIGHT: 175 LBS | HEIGHT: 71 IN | OXYGEN SATURATION: 98 % | DIASTOLIC BLOOD PRESSURE: 86 MMHG | TEMPERATURE: 97.6 F | SYSTOLIC BLOOD PRESSURE: 142 MMHG

## 2021-04-12 DIAGNOSIS — R41.89 EPISODE OF ALTERED COGNITION: ICD-10-CM

## 2021-04-12 DIAGNOSIS — R41.3 MEMORY LOSS OR IMPAIRMENT: ICD-10-CM

## 2021-04-12 DIAGNOSIS — F31.31 BIPOLAR AFFECTIVE DISORDER, CURRENTLY DEPRESSED, MILD (HCC): Primary | ICD-10-CM

## 2021-04-12 DIAGNOSIS — F41.9 ANXIETY: ICD-10-CM

## 2021-04-12 PROCEDURE — 99214 OFFICE O/P EST MOD 30 MIN: CPT | Performed by: NURSE PRACTITIONER

## 2021-04-12 RX ORDER — ESCITALOPRAM OXALATE 10 MG/1
TABLET ORAL
COMMUNITY
Start: 2021-03-11 | End: 2021-04-12

## 2021-04-12 RX ORDER — BUPROPION HYDROCHLORIDE 300 MG/1
300 TABLET ORAL DAILY
Qty: 90 TABLET | Refills: 3 | Status: SHIPPED | OUTPATIENT
Start: 2021-04-12 | End: 2021-04-28 | Stop reason: ALTCHOICE

## 2021-04-12 RX ORDER — LAMOTRIGINE 25 MG/1
25 TABLET ORAL DAILY
Qty: 30 TABLET | Refills: 3 | Status: SHIPPED | OUTPATIENT
Start: 2021-04-12 | End: 2021-04-19

## 2021-04-12 NOTE — PROGRESS NOTES
Subjective   Antione Villaseñor is a 25 y.o. male.     CC: Anxiety, depression    Anxiety  Presents for follow-up visit. Symptoms include depressed mood, insomnia, nervous/anxious behavior, panic and restlessness. Patient reports no chest pain, dizziness, nausea, palpitations, shortness of breath or suicidal ideas. Symptoms occur most days. The severity of symptoms is moderate. The quality of sleep is fair. Nighttime awakenings: occasional.     His past medical history is significant for depression. Compliance with medications is %.   Depression  Visit Type: follow-up (improving with wellbutrin)  Patient presents with the following symptoms: depressed mood, insomnia, nervousness/anxiety, panic and restlessness.  Patient is not experiencing: anhedonia, feelings of hopelessness, feelings of worthlessness, palpitations, shortness of breath, suicidal ideas, suicidal planning, thoughts of death, weight gain and weight loss.  Frequency of symptoms: most days   Severity: mild   Sleep quality: fair  Nighttime awakenings: occasional  Compliance with medications:  %             The following portions of the patient's history were reviewed and updated as appropriate: allergies, current medications, past family history, past medical history, past social history, past surgical history and problem list.    Review of Systems   Constitutional: Negative for activity change, appetite change, fatigue, unexpected weight gain and unexpected weight loss.   HENT: Negative for congestion, sore throat, trouble swallowing and voice change.    Eyes: Negative.    Respiratory: Negative for cough, chest tightness, shortness of breath and wheezing.    Cardiovascular: Negative for chest pain, palpitations and leg swelling.   Gastrointestinal: Negative for abdominal pain, diarrhea, nausea and vomiting.   Endocrine: Negative.    Genitourinary: Negative for dysuria, hematuria and urgency.   Musculoskeletal: Negative for arthralgias and  myalgias.   Skin: Negative for rash.   Neurological: Negative for dizziness, weakness, light-headedness and headache.   Hematological: Negative.    Psychiatric/Behavioral: Positive for depressed mood and stress. Negative for suicidal ideas. The patient is nervous/anxious and has insomnia.        Objective   Physical Exam  Vitals and nursing note reviewed.   Constitutional:       General: He is not in acute distress.     Appearance: Normal appearance. He is well-developed and normal weight. He is not ill-appearing, toxic-appearing or diaphoretic.   HENT:      Head: Normocephalic and atraumatic.   Eyes:      Conjunctiva/sclera: Conjunctivae normal.   Pulmonary:      Effort: Pulmonary effort is normal. No respiratory distress.      Breath sounds: Normal breath sounds.   Abdominal:      General: Bowel sounds are normal. There is no distension.      Palpations: Abdomen is soft. There is no mass.      Tenderness: There is no abdominal tenderness. There is no guarding or rebound.      Hernia: No hernia is present.   Musculoskeletal:         General: No tenderness. Normal range of motion.      Cervical back: Normal range of motion.   Skin:     General: Skin is warm and dry.      Coloration: Skin is not pale.      Findings: No erythema or rash.   Neurological:      Mental Status: He is alert and oriented to person, place, and time.   Psychiatric:         Attention and Perception: Attention and perception normal.         Mood and Affect: Mood is anxious.         Speech: Speech normal.         Behavior: Behavior normal. Behavior is cooperative.         Thought Content: Thought content normal. Thought content is not paranoid or delusional. Thought content does not include homicidal or suicidal ideation. Thought content does not include homicidal or suicidal plan.         Cognition and Memory: Cognition and memory normal.         Judgment: Judgment normal.           Assessment/Plan   Diagnoses and all orders for this  visit:    1. Bipolar affective disorder, currently depressed, mild (CMS/HCC) (Primary)  -     lamoTRIgine (LaMICtal) 25 MG tablet; Take 1 tablet by mouth Daily.  Dispense: 30 tablet; Refill: 3  -     buPROPion XL (Wellbutrin XL) 300 MG 24 hr tablet; Take 1 tablet by mouth Daily.  Dispense: 90 tablet; Refill: 3   -Patient has noted a mild improvement in depression symptoms since starting Wellbutrin.  Has not noticed any change in anxiety symptoms.  We will increase Wellbutrin to 300 mg daily.  We will also add on Lamictal 25 mg daily.  Instructed patient to stop Lamictal if symptoms worsen or present to the ER for suicidal homicidal ideations.  Patient currently tolerating Wellbutrin with no reported side effects.  Patient has follow-up with psychiatry in approximately 1 month.  We will continue to monitor.  Patient has no current suicidal homicidal ideations.  Patient verbalized understanding of instruction.    2. Anxiety  -     lamoTRIgine (LaMICtal) 25 MG tablet; Take 1 tablet by mouth Daily.  Dispense: 30 tablet; Refill: 3  -     buPROPion XL (Wellbutrin XL) 300 MG 24 hr tablet; Take 1 tablet by mouth Daily.  Dispense: 90 tablet; Refill: 3   -Plan of care stated above #1.    3. Memory loss or impairment   -Improving with anxiety and depression treatment.  Current neurological evaluations have been unremarkable.  Continue follow-up with neurology as scheduled.    4. Episode of altered cognition   -Plan of care stated above #3.    5.  Follow-up in 1 month or sooner for any acute needs.          This document has been electronically signed by LISBETH Gutierrez on April 12, 2021 15:40 CDT

## 2021-04-13 DIAGNOSIS — F32.0 CURRENT MILD EPISODE OF MAJOR DEPRESSIVE DISORDER, UNSPECIFIED WHETHER RECURRENT (HCC): ICD-10-CM

## 2021-04-15 RX ORDER — ESCITALOPRAM OXALATE 10 MG/1
TABLET ORAL
Qty: 30 TABLET | Refills: 0 | OUTPATIENT
Start: 2021-04-15

## 2021-04-19 DIAGNOSIS — F31.31 BIPOLAR AFFECTIVE DISORDER, CURRENTLY DEPRESSED, MILD (HCC): ICD-10-CM

## 2021-04-19 DIAGNOSIS — F41.9 ANXIETY: ICD-10-CM

## 2021-04-19 RX ORDER — LAMOTRIGINE 25 MG/1
25 TABLET ORAL 2 TIMES DAILY
Qty: 60 TABLET | Refills: 3 | Status: SHIPPED | OUTPATIENT
Start: 2021-04-19 | End: 2021-05-10

## 2021-04-28 DIAGNOSIS — F41.9 ANXIETY: ICD-10-CM

## 2021-04-28 DIAGNOSIS — F31.31 BIPOLAR AFFECTIVE DISORDER, CURRENTLY DEPRESSED, MILD (HCC): ICD-10-CM

## 2021-04-28 RX ORDER — BUPROPION HCL 150 MG
150 TABLET,SUSTAINED-RELEASE 12 HR ORAL 2 TIMES DAILY
Qty: 60 TABLET | Refills: 5 | Status: SHIPPED | OUTPATIENT
Start: 2021-04-28 | End: 2021-06-03

## 2021-04-29 ENCOUNTER — TELEPHONE (OUTPATIENT)
Dept: FAMILY MEDICINE CLINIC | Facility: CLINIC | Age: 26
End: 2021-04-29

## 2021-04-29 NOTE — TELEPHONE ENCOUNTER
Patient was called to let him know PA was initiated and may take several days to get a decisions.    ----- Message from LISBETH Gutierrez sent at 4/29/2021  2:00 PM CDT -----  Regarding: RE: Prescription Question  Contact: [phone]  ...    ----- Message -----  From: Antione Jose Villaseñor  Sent: 4/29/2021   1:37 PM CDT  To: [facility] 5th Clinical Pool  Subject: RE: Prescription Question                        [employer] (my insurance) is saying they haven’t received a PA. They mentioned I might be giving them the wrong Member ID just wanted to confirm this was called in. Insurance is telling me they would have already approved it.     Thank You.

## 2021-04-30 ENCOUNTER — TELEPHONE (OUTPATIENT)
Dept: FAMILY MEDICINE CLINIC | Facility: CLINIC | Age: 26
End: 2021-04-30

## 2021-04-30 ENCOUNTER — DOCUMENTATION (OUTPATIENT)
Dept: FAMILY MEDICINE CLINIC | Facility: CLINIC | Age: 26
End: 2021-04-30

## 2021-04-30 NOTE — PROGRESS NOTES
Prior Authorization for WELLBUTRIN SR was denied. Not covered under insurance plan.  Preferred alternative:  BUPROPION HCL SR.  Patient/PCP notified.

## 2021-05-10 ENCOUNTER — OFFICE VISIT (OUTPATIENT)
Dept: FAMILY MEDICINE CLINIC | Facility: CLINIC | Age: 26
End: 2021-05-10

## 2021-05-10 VITALS
DIASTOLIC BLOOD PRESSURE: 72 MMHG | BODY MASS INDEX: 24.25 KG/M2 | TEMPERATURE: 98.5 F | OXYGEN SATURATION: 97 % | SYSTOLIC BLOOD PRESSURE: 120 MMHG | WEIGHT: 173.2 LBS | HEIGHT: 71 IN | HEART RATE: 74 BPM

## 2021-05-10 DIAGNOSIS — R41.3 MEMORY LOSS OR IMPAIRMENT: ICD-10-CM

## 2021-05-10 DIAGNOSIS — F41.9 ANXIETY: Primary | ICD-10-CM

## 2021-05-10 DIAGNOSIS — F33.0 MILD EPISODE OF RECURRENT MAJOR DEPRESSIVE DISORDER (HCC): ICD-10-CM

## 2021-05-10 DIAGNOSIS — R41.89 EPISODE OF ALTERED COGNITION: ICD-10-CM

## 2021-05-10 PROCEDURE — 99214 OFFICE O/P EST MOD 30 MIN: CPT | Performed by: NURSE PRACTITIONER

## 2021-05-10 RX ORDER — QUETIAPINE FUMARATE 25 MG/1
25 TABLET, FILM COATED ORAL NIGHTLY
Qty: 30 TABLET | Refills: 3 | Status: SHIPPED | OUTPATIENT
Start: 2021-05-10 | End: 2021-12-06

## 2021-05-10 NOTE — PROGRESS NOTES
Subjective   Antione Villaseñor is a 25 y.o. male.     CC: Follow-up-anxiety, depression, altered cognition/memory impairment    Anxiety  Presents for follow-up visit. Symptoms include depressed mood, insomnia, nervous/anxious behavior, panic and restlessness. Patient reports no chest pain, dizziness, irritability, nausea, palpitations, shortness of breath or suicidal ideas. Symptoms occur most days. The severity of symptoms is interfering with daily activities and moderate. The quality of sleep is fair. Nighttime awakenings: occasional.     His past medical history is significant for depression. Compliance with medications is %.   Depression  Visit Type: follow-up  Patient presents with the following symptoms: depressed mood, insomnia, nervousness/anxiety, panic and restlessness.  Patient is not experiencing: anhedonia, feelings of hopelessness, feelings of worthlessness, irritability, palpitations, shortness of breath, suicidal ideas, weight gain and weight loss.  Frequency of symptoms: occasionally   Severity: mild   Sleep quality: fair  Nighttime awakenings: occasional  Compliance with medications:  %        Memory Loss  This is a chronic problem. The current episode started more than 1 year ago. The problem occurs intermittently. The problem has been unchanged (stable). Pertinent negatives include no abdominal pain, arthralgias, change in bowel habit, chest pain, chills, congestion, coughing, fatigue, fever, myalgias, nausea, rash, sore throat, vomiting or weakness. The symptoms are aggravated by stress. Treatments tried: wellbutrin, lamictal. The treatment provided mild relief.        The following portions of the patient's history were reviewed and updated as appropriate: allergies, current medications, past family history, past medical history, past social history, past surgical history and problem list.    Review of Systems   Constitutional: Negative for activity change, appetite change,  chills, fatigue, fever, irritability, unexpected weight gain and unexpected weight loss.   HENT: Negative for congestion, sore throat, trouble swallowing and voice change.    Eyes: Negative.    Respiratory: Negative for cough, chest tightness, shortness of breath and wheezing.    Cardiovascular: Negative for chest pain, palpitations and leg swelling.   Gastrointestinal: Negative for abdominal pain, change in bowel habit, diarrhea, nausea and vomiting.   Endocrine: Negative.    Genitourinary: Negative for dysuria, hematuria and urgency.   Musculoskeletal: Negative for arthralgias and myalgias.   Skin: Negative for rash.   Neurological: Positive for memory problem. Negative for dizziness, weakness, light-headedness and headache.   Hematological: Negative.    Psychiatric/Behavioral: Positive for sleep disturbance, depressed mood and stress. Negative for agitation and suicidal ideas. The patient is nervous/anxious and has insomnia.        Objective   Physical Exam  Vitals and nursing note reviewed.   Constitutional:       General: He is not in acute distress.     Appearance: Normal appearance. He is well-developed and normal weight. He is not ill-appearing, toxic-appearing or diaphoretic.   HENT:      Head: Normocephalic and atraumatic.   Eyes:      Conjunctiva/sclera: Conjunctivae normal.   Cardiovascular:      Rate and Rhythm: Normal rate and regular rhythm.      Heart sounds: Normal heart sounds. No murmur heard.   No friction rub. No gallop.    Pulmonary:      Effort: Pulmonary effort is normal. No respiratory distress.      Breath sounds: Normal breath sounds. No stridor. No wheezing, rhonchi or rales.   Abdominal:      General: Bowel sounds are normal. There is no distension.      Palpations: Abdomen is soft. There is no mass.      Tenderness: There is no abdominal tenderness. There is no guarding or rebound.      Hernia: No hernia is present.   Musculoskeletal:         General: No tenderness. Normal range of  motion.      Cervical back: Normal range of motion.   Skin:     General: Skin is warm and dry.      Coloration: Skin is not pale.      Findings: No erythema or rash.   Neurological:      Mental Status: He is alert and oriented to person, place, and time.   Psychiatric:         Attention and Perception: Attention and perception normal.         Mood and Affect: Affect normal. Mood is anxious. Mood is not depressed.         Speech: Speech normal.         Behavior: Behavior normal. Behavior is cooperative.         Thought Content: Thought content normal. Thought content is not paranoid or delusional. Thought content does not include homicidal or suicidal ideation. Thought content does not include homicidal or suicidal plan.         Cognition and Memory: Cognition and memory normal.         Judgment: Judgment normal.           Assessment/Plan   Diagnoses and all orders for this visit:    1. Anxiety (Primary)  -Patient discontinued Lamictal.  He initially tolerated it well but then reported having increased anxiety/panic after taking Lamictal.  When he discontinued the medication the symptoms subsided some.  We will continue Wellbutrin at current dosage.  Patient has agreed to Seroquel 25 mg p.o. nightly as adjunct therapy for his anxiety and depression and also to help with some insomnia symptoms.  Instructed patient to discontinue medication if symptoms worsen.  Present to the ER for suicidal homicidal ideations.  Continue follow-up with counseling and psychiatry.  Patient has yet to establish with psychiatry but has upcoming appointment.  Patient verbalized understanding of instruction.       -QUEtiapine (SEROquel) 25 MG tablet; Take 1 tablet by mouth Every Night.  Dispense: 30 tablet; Refill: 3    2. Memory loss or impairment   -Stable.  Neurologic work-up unremarkable.  Likely related to severe anxiety.  We will continue to monitor.    3. Episode of altered cognition   -Plan of care stated above in #2.    4. Mild  episode of recurrent major depressive disorder (CMS/HCC)  -No suicidal homicidal ideations.  Plan of care stated above #1      - QUEtiapine (SEROquel) 25 MG tablet; Take 1 tablet by mouth Every Night.  Dispense: 30 tablet; Refill: 3      5.  Follow-up in 1 month or sooner for any acute needs.          This document has been electronically signed by LISBETH Gutierrez on May 10, 2021 08:57 CDT

## 2021-06-03 DIAGNOSIS — F41.9 ANXIETY: Primary | ICD-10-CM

## 2021-06-03 DIAGNOSIS — F33.0 MILD EPISODE OF RECURRENT MAJOR DEPRESSIVE DISORDER (HCC): ICD-10-CM

## 2021-06-03 RX ORDER — BUPROPION HCL 300 MG
300 TABLET, EXTENDED RELEASE 24 HR ORAL DAILY
Qty: 30 TABLET | Refills: 5 | OUTPATIENT
Start: 2021-06-03 | End: 2021-08-12

## 2021-06-07 ENCOUNTER — OFFICE VISIT (OUTPATIENT)
Dept: FAMILY MEDICINE CLINIC | Facility: CLINIC | Age: 26
End: 2021-06-07

## 2021-06-07 VITALS
TEMPERATURE: 96.7 F | HEIGHT: 71 IN | DIASTOLIC BLOOD PRESSURE: 80 MMHG | RESPIRATION RATE: 20 BRPM | BODY MASS INDEX: 23.84 KG/M2 | OXYGEN SATURATION: 100 % | HEART RATE: 64 BPM | SYSTOLIC BLOOD PRESSURE: 140 MMHG | WEIGHT: 170.3 LBS

## 2021-06-07 DIAGNOSIS — F41.9 ANXIETY: Primary | ICD-10-CM

## 2021-06-07 DIAGNOSIS — F33.0 MILD EPISODE OF RECURRENT MAJOR DEPRESSIVE DISORDER (HCC): ICD-10-CM

## 2021-06-07 PROBLEM — F31.9 BIPOLAR DISORDER: Status: ACTIVE | Noted: 2021-06-01

## 2021-06-07 PROBLEM — M51.16 INTERVERTEBRAL DISC DISORDERS WITH RADICULOPATHY, LUMBAR REGION: Status: ACTIVE | Noted: 2021-06-01

## 2021-06-07 PROBLEM — E53.9 BURNING FEET SYNDROME: Status: ACTIVE | Noted: 2021-06-01

## 2021-06-07 PROBLEM — E29.1 MALE HYPOGONADISM: Status: ACTIVE | Noted: 2021-06-01

## 2021-06-07 PROBLEM — F32.A DEPRESSIVE DISORDER: Status: ACTIVE | Noted: 2021-06-01

## 2021-06-07 PROBLEM — E34.52 INFERTILE MALE SYNDROME: Status: ACTIVE | Noted: 2021-06-01

## 2021-06-07 PROBLEM — M54.42 CHRONIC MIDLINE LOW BACK PAIN WITH LEFT-SIDED SCIATICA: Status: ACTIVE | Noted: 2021-06-01

## 2021-06-07 PROBLEM — R01.1 HEART MURMUR: Status: ACTIVE | Noted: 2021-06-01

## 2021-06-07 PROBLEM — G89.29 CHRONIC MIDLINE LOW BACK PAIN WITH LEFT-SIDED SCIATICA: Status: ACTIVE | Noted: 2021-06-01

## 2021-06-07 PROBLEM — R00.2 PALPITATIONS: Status: ACTIVE | Noted: 2021-06-01

## 2021-06-07 PROBLEM — Z00.00 ENCOUNTER FOR GENERAL ADULT MEDICAL EXAMINATION WITHOUT ABNORMAL FINDINGS: Status: ACTIVE | Noted: 2021-06-01

## 2021-06-07 PROCEDURE — 99214 OFFICE O/P EST MOD 30 MIN: CPT | Performed by: NURSE PRACTITIONER

## 2021-06-07 NOTE — PROGRESS NOTES
Subjective   Antione Villaseñor is a 25 y.o. male.     CC: Anxiety, depression    Anxiety  Presents for follow-up visit. Symptoms include nervous/anxious behavior (stable). Patient reports no chest pain, depressed mood, dizziness, nausea, palpitations or shortness of breath. Symptoms occur most days. The severity of symptoms is moderate. The quality of sleep is good. Nighttime awakenings: occasional.     His past medical history is significant for depression. Compliance with medications is %.   Depression  Visit Type: follow-up  Patient presents with the following symptoms: nervousness/anxiety (stable).  Patient is not experiencing: anhedonia, depressed mood, feelings of hopelessness, feelings of worthlessness, palpitations, shortness of breath, weight gain and weight loss.  Frequency of symptoms: occasionally   Severity: mild   Sleep quality: fair  Nighttime awakenings: occasional  Compliance with medications:  %             The following portions of the patient's history were reviewed and updated as appropriate: allergies, current medications, past family history, past medical history, past social history, past surgical history and problem list.    Review of Systems   Constitutional: Negative for activity change, appetite change, chills, fatigue, fever, unexpected weight gain and unexpected weight loss.   HENT: Negative for congestion, sore throat, trouble swallowing and voice change.    Eyes: Negative.    Respiratory: Negative for cough, chest tightness, shortness of breath and wheezing.    Cardiovascular: Negative for chest pain, palpitations and leg swelling.   Gastrointestinal: Negative for abdominal pain, diarrhea, nausea and vomiting.   Endocrine: Negative.    Genitourinary: Negative for dysuria, hematuria and urgency.   Musculoskeletal: Negative for arthralgias and myalgias.   Skin: Negative for rash.   Neurological: Negative for dizziness, weakness, light-headedness and headache.    Hematological: Negative.    Psychiatric/Behavioral: Positive for stress. Negative for depressed mood. The patient is nervous/anxious (stable).        Objective   Physical Exam  Vitals and nursing note reviewed.   Constitutional:       General: He is not in acute distress.     Appearance: Normal appearance. He is well-developed and normal weight. He is not ill-appearing, toxic-appearing or diaphoretic.   HENT:      Head: Normocephalic and atraumatic.   Eyes:      Conjunctiva/sclera: Conjunctivae normal.   Cardiovascular:      Rate and Rhythm: Normal rate and regular rhythm.      Heart sounds: Normal heart sounds. No murmur heard.   No friction rub. No gallop.    Pulmonary:      Effort: Pulmonary effort is normal. No respiratory distress.      Breath sounds: Normal breath sounds. No stridor. No wheezing, rhonchi or rales.   Abdominal:      General: Bowel sounds are normal. There is no distension.      Palpations: Abdomen is soft. There is no mass.      Tenderness: There is no abdominal tenderness. There is no guarding or rebound.      Hernia: No hernia is present.   Musculoskeletal:         General: No tenderness. Normal range of motion.      Cervical back: Normal range of motion.   Skin:     General: Skin is warm and dry.      Coloration: Skin is not pale.      Findings: No erythema or rash.   Neurological:      Mental Status: He is alert and oriented to person, place, and time.   Psychiatric:         Attention and Perception: Attention and perception normal.         Mood and Affect: Affect normal. Mood is anxious.         Speech: Speech normal.         Behavior: Behavior normal. Behavior is cooperative.         Thought Content: Thought content normal. Thought content is not paranoid. Thought content does not include homicidal or suicidal ideation. Thought content does not include homicidal or suicidal plan.         Cognition and Memory: Cognition and memory normal.         Judgment: Judgment normal.            Assessment/Plan   Diagnoses and all orders for this visit:    1. Anxiety (Primary)   -Stable with no suicidal homicidal ideations.  We will continue Seroquel and Wellbutrin at this time.  Continue follow-up with psychiatry.    2. Mild episode of recurrent major depressive disorder (CMS/HCC)   -Stable with no suicidal homicidal ideations.  We will continue Seroquel and Wellbutrin at this time.  Continue follow-up with psychiatry.    3.  Follow-up in 3 months or sooner for any acute needs.            This document has been electronically signed by LISBETH Gutierrez on June 7, 2021 13:16 CDT

## 2021-08-13 PROCEDURE — 87635 SARS-COV-2 COVID-19 AMP PRB: CPT | Performed by: NURSE PRACTITIONER

## 2021-12-06 ENCOUNTER — LAB (OUTPATIENT)
Dept: LAB | Facility: HOSPITAL | Age: 26
End: 2021-12-06

## 2021-12-06 ENCOUNTER — OFFICE VISIT (OUTPATIENT)
Dept: FAMILY MEDICINE CLINIC | Facility: CLINIC | Age: 26
End: 2021-12-06

## 2021-12-06 VITALS
DIASTOLIC BLOOD PRESSURE: 90 MMHG | SYSTOLIC BLOOD PRESSURE: 150 MMHG | BODY MASS INDEX: 25.66 KG/M2 | HEIGHT: 71 IN | WEIGHT: 183.3 LBS | RESPIRATION RATE: 20 BRPM | OXYGEN SATURATION: 96 % | TEMPERATURE: 97.3 F | HEART RATE: 107 BPM

## 2021-12-06 DIAGNOSIS — R42 DIZZINESS: Primary | ICD-10-CM

## 2021-12-06 DIAGNOSIS — R55 SYNCOPE, UNSPECIFIED SYNCOPE TYPE: ICD-10-CM

## 2021-12-06 DIAGNOSIS — R42 DIZZINESS: ICD-10-CM

## 2021-12-06 DIAGNOSIS — G56.03 BILATERAL CARPAL TUNNEL SYNDROME: ICD-10-CM

## 2021-12-06 DIAGNOSIS — Z23 NEED FOR INFLUENZA VACCINATION: ICD-10-CM

## 2021-12-06 PROBLEM — R30.0 DIFFICULT OR PAINFUL URINATION: Status: ACTIVE | Noted: 2021-10-01

## 2021-12-06 LAB
ALBUMIN SERPL-MCNC: 4.5 G/DL (ref 3.5–5.2)
ALBUMIN/GLOB SERPL: 1.8 G/DL
ALP SERPL-CCNC: 57 U/L (ref 39–117)
ALT SERPL W P-5'-P-CCNC: 47 U/L (ref 1–41)
ANION GAP SERPL CALCULATED.3IONS-SCNC: 5 MMOL/L (ref 5–15)
AST SERPL-CCNC: 37 U/L (ref 1–40)
BASOPHILS # BLD AUTO: 0.02 10*3/MM3 (ref 0–0.2)
BASOPHILS NFR BLD AUTO: 0.4 % (ref 0–1.5)
BILIRUB SERPL-MCNC: 0.6 MG/DL (ref 0–1.2)
BUN SERPL-MCNC: 9 MG/DL (ref 6–20)
BUN/CREAT SERPL: 9.5 (ref 7–25)
CALCIUM SPEC-SCNC: 9.4 MG/DL (ref 8.6–10.5)
CHLORIDE SERPL-SCNC: 103 MMOL/L (ref 98–107)
CO2 SERPL-SCNC: 31 MMOL/L (ref 22–29)
CREAT SERPL-MCNC: 0.95 MG/DL (ref 0.76–1.27)
DEPRECATED RDW RBC AUTO: 40.7 FL (ref 37–54)
EOSINOPHIL # BLD AUTO: 0.08 10*3/MM3 (ref 0–0.4)
EOSINOPHIL NFR BLD AUTO: 1.4 % (ref 0.3–6.2)
ERYTHROCYTE [DISTWIDTH] IN BLOOD BY AUTOMATED COUNT: 12.4 % (ref 12.3–15.4)
GFR SERPL CREATININE-BSD FRML MDRD: 94 ML/MIN/1.73
GLOBULIN UR ELPH-MCNC: 2.5 GM/DL
GLUCOSE SERPL-MCNC: 98 MG/DL (ref 65–99)
HCT VFR BLD AUTO: 47.5 % (ref 37.5–51)
HGB BLD-MCNC: 16.4 G/DL (ref 13–17.7)
IMM GRANULOCYTES # BLD AUTO: 0.05 10*3/MM3 (ref 0–0.05)
IMM GRANULOCYTES NFR BLD AUTO: 0.9 % (ref 0–0.5)
LYMPHOCYTES # BLD AUTO: 1.46 10*3/MM3 (ref 0.7–3.1)
LYMPHOCYTES NFR BLD AUTO: 26.1 % (ref 19.6–45.3)
MCH RBC QN AUTO: 31.2 PG (ref 26.6–33)
MCHC RBC AUTO-ENTMCNC: 34.5 G/DL (ref 31.5–35.7)
MCV RBC AUTO: 90.5 FL (ref 79–97)
MONOCYTES # BLD AUTO: 0.66 10*3/MM3 (ref 0.1–0.9)
MONOCYTES NFR BLD AUTO: 11.8 % (ref 5–12)
NEUTROPHILS NFR BLD AUTO: 3.33 10*3/MM3 (ref 1.7–7)
NEUTROPHILS NFR BLD AUTO: 59.4 % (ref 42.7–76)
NRBC BLD AUTO-RTO: 0 /100 WBC (ref 0–0.2)
PLATELET # BLD AUTO: 255 10*3/MM3 (ref 140–450)
PMV BLD AUTO: 9.6 FL (ref 6–12)
POTASSIUM SERPL-SCNC: 4.2 MMOL/L (ref 3.5–5.2)
PROT SERPL-MCNC: 7 G/DL (ref 6–8.5)
RBC # BLD AUTO: 5.25 10*6/MM3 (ref 4.14–5.8)
SODIUM SERPL-SCNC: 139 MMOL/L (ref 136–145)
WBC NRBC COR # BLD: 5.6 10*3/MM3 (ref 3.4–10.8)

## 2021-12-06 PROCEDURE — 80053 COMPREHEN METABOLIC PANEL: CPT

## 2021-12-06 PROCEDURE — 83735 ASSAY OF MAGNESIUM: CPT

## 2021-12-06 PROCEDURE — 99214 OFFICE O/P EST MOD 30 MIN: CPT | Performed by: NURSE PRACTITIONER

## 2021-12-06 PROCEDURE — 85025 COMPLETE CBC W/AUTO DIFF WBC: CPT

## 2021-12-06 PROCEDURE — 36415 COLL VENOUS BLD VENIPUNCTURE: CPT

## 2021-12-06 PROCEDURE — 82607 VITAMIN B-12: CPT

## 2021-12-06 PROCEDURE — 83540 ASSAY OF IRON: CPT

## 2021-12-06 PROCEDURE — 82728 ASSAY OF FERRITIN: CPT

## 2021-12-06 PROCEDURE — 90686 IIV4 VACC NO PRSV 0.5 ML IM: CPT | Performed by: NURSE PRACTITIONER

## 2021-12-06 PROCEDURE — 90471 IMMUNIZATION ADMIN: CPT | Performed by: NURSE PRACTITIONER

## 2021-12-06 PROCEDURE — 84466 ASSAY OF TRANSFERRIN: CPT

## 2021-12-06 RX ORDER — VENLAFAXINE HYDROCHLORIDE 150 MG/1
150 CAPSULE, EXTENDED RELEASE ORAL DAILY
COMMUNITY
Start: 2021-11-15 | End: 2022-04-19

## 2021-12-06 RX ORDER — MELOXICAM 15 MG/1
15 TABLET ORAL DAILY
Qty: 30 TABLET | Refills: 1 | Status: SHIPPED | OUTPATIENT
Start: 2021-12-06 | End: 2022-02-01

## 2021-12-06 RX ORDER — VENLAFAXINE HYDROCHLORIDE 75 MG/1
150 CAPSULE, EXTENDED RELEASE ORAL DAILY
COMMUNITY
Start: 2021-09-12 | End: 2021-12-20

## 2021-12-06 NOTE — PROGRESS NOTES
Subjective   Antione Villaseñor is a 26 y.o. male.     CC: Syncope, numbness of hands    Syncope  This is a new problem. The current episode started 1 to 4 weeks ago. The problem occurs rarely. The problem has been resolved. Exacerbated by: possilbe lack of hydration. Associated symptoms include dizziness. Pertinent negatives include no abdominal pain, chest pain, fever, light-headedness, nausea, palpitations, vomiting or weakness. He has tried nothing for the symptoms. The treatment provided no relief. There is no history of CVA, DM, HTN or seizures.   Hand Pain   The incident occurred more than 1 week ago. There was no injury mechanism. The pain is present in the left hand and right hand. Quality: numbness and tingling. Associated symptoms include numbness (bilateral hands with weak  reported) and tingling. Pertinent negatives include no chest pain. Exacerbated by: positional, worse while sleeping. He has tried nothing for the symptoms. The treatment provided no relief.        The following portions of the patient's history were reviewed and updated as appropriate: allergies, current medications, past family history, past medical history, past social history, past surgical history and problem list.    Review of Systems   Constitutional: Negative for activity change, appetite change, chills, fatigue, fever, unexpected weight gain and unexpected weight loss.   HENT: Negative for congestion, sore throat, trouble swallowing and voice change.    Eyes: Negative.    Respiratory: Negative for cough, chest tightness, shortness of breath and wheezing.    Cardiovascular: Positive for syncope. Negative for chest pain, palpitations and leg swelling.   Gastrointestinal: Negative for abdominal pain, diarrhea, nausea and vomiting.   Endocrine: Negative.  Negative for cold intolerance, heat intolerance, polydipsia, polyphagia and polyuria.   Genitourinary: Negative for dysuria, hematuria and urgency.   Musculoskeletal:  Negative for arthralgias and myalgias.   Skin: Negative for rash.   Neurological: Positive for dizziness, tingling, syncope and numbness (bilateral hands with weak  reported). Negative for weakness, light-headedness and headache.   Hematological: Negative.    Psychiatric/Behavioral: Negative.        Objective   Physical Exam  Vitals and nursing note reviewed.   Constitutional:       General: He is not in acute distress.     Appearance: Normal appearance. He is well-developed and normal weight. He is not ill-appearing, toxic-appearing or diaphoretic.   HENT:      Head: Normocephalic and atraumatic.   Eyes:      Extraocular Movements: Extraocular movements intact.      Conjunctiva/sclera: Conjunctivae normal.      Pupils: Pupils are equal, round, and reactive to light.   Cardiovascular:      Rate and Rhythm: Normal rate and regular rhythm.      Heart sounds: Normal heart sounds. No murmur heard.  No friction rub. No gallop.    Pulmonary:      Effort: Pulmonary effort is normal. No respiratory distress.      Breath sounds: Normal breath sounds. No stridor. No wheezing, rhonchi or rales.   Abdominal:      General: Bowel sounds are normal. There is no distension.      Palpations: Abdomen is soft. There is no mass.      Tenderness: There is no abdominal tenderness. There is no guarding or rebound.      Hernia: No hernia is present.   Musculoskeletal:         General: No tenderness. Normal range of motion.      Right hand: Decreased strength.      Left hand: Decreased strength.      Cervical back: Normal range of motion.      Comments: Positive Phalen's sign with whole hand numbness bilaterally.  Significantly decreased  strength bilaterally with right worse than left.   Skin:     General: Skin is warm and dry.      Coloration: Skin is not pale.      Findings: No erythema or rash.   Neurological:      General: No focal deficit present.      Mental Status: He is alert and oriented to person, place, and time.       Cranial Nerves: No cranial nerve deficit.      Motor: Weakness (bilateral hands/) present.      Gait: Gait normal.   Psychiatric:         Mood and Affect: Mood normal.         Behavior: Behavior normal.         Thought Content: Thought content normal.         Judgment: Judgment normal.           Assessment/Plan   Diagnoses and all orders for this visit:    1. Dizziness (Primary)  -     ECG 12 Lead  -     Holter Monitor - 72 Hour Up To 15 Days; Future  -     CBC & Differential; Future  -     Comprehensive Metabolic Panel; Future  -     Magnesium; Future  -     Iron Profile; Future  -     Vitamin B12; Future  -     Ferritin; Future, will call with results.  Will obtain EKG and Holter monitor.  Patient denies chest pain, shortness of breath or palpitations.  MRI of head earlier this year was unremarkable per radiology report.  Patient is concerned that the symptoms may be related to a lack of hydration as he does not drink an adequate amount of fluids during the day.  Instructed patient to increase water intake while we are completing further work-up and continue to monitor symptoms.  Patient has had no repeat episodes and only reports a one-time event.  Patient verbalized understanding of instruction agrees with plan of care.    2. Syncope, unspecified syncope type  -     ECG 12 Lead  -     Holter Monitor - 72 Hour Up To 15 Days; Future  -     CBC & Differential; Future  -     Comprehensive Metabolic Panel; Future  -     Magnesium; Future  -     Iron Profile; Future  -     Vitamin B12; Future  -     Ferritin; Future, will call with results.  Plan of care as noted above in #1.    3. Bilateral carpal tunnel syndrome  -     Elastic Bandages & Supports (Wrist Brace/Right Large) misc; 1 each Daily.  Dispense: 1 each; Refill: 0  -     Elastic Bandages & Supports (Wrist Brace//Left Large) misc; 1 each Daily.  Dispense: 1 each; Refill: 0  -     meloxicam (MOBIC) 15 MG tablet; Take 1 tablet by mouth Daily.  Dispense: 30  tablet; Refill: 1  -     Ambulatory Referral to Orthopedic Surgery   -Patient reports symptoms as chronic and mostly happening during the night.  However in the last few months he is reported daytime symptoms and decreased strength in bilateral hands.  This was reproducible on exam today.  Will have patient wear wrist braces nightly and as needed during the day and start a trial of meloxicam.  Will refer to orthopedic surgery for further evaluation and treatment options.    4. Need for influenza vaccination  -     FluLaval/Fluarix/Fluzone >6 Months ([id number]), tolerated well with no adverse reaction.    5.  Further follow-up plan of care pending lab and cardiology results.              This document has been electronically signed by LISBETH Gutierrez on December 6, 2021 10:50 CST

## 2021-12-07 LAB
FERRITIN SERPL-MCNC: 60.5 NG/ML (ref 30–400)
IRON 24H UR-MRATE: 235 MCG/DL (ref 59–158)
IRON SATN MFR SERPL: 62 % (ref 20–50)
MAGNESIUM SERPL-MCNC: 2.2 MG/DL (ref 1.6–2.6)
TIBC SERPL-MCNC: 381 MCG/DL (ref 298–536)
TRANSFERRIN SERPL-MCNC: 256 MG/DL (ref 200–360)
VIT B12 BLD-MCNC: 539 PG/ML (ref 211–946)

## 2021-12-17 DIAGNOSIS — M25.532 BILATERAL WRIST PAIN: Primary | ICD-10-CM

## 2021-12-17 DIAGNOSIS — M25.531 BILATERAL WRIST PAIN: Primary | ICD-10-CM

## 2021-12-20 ENCOUNTER — OFFICE VISIT (OUTPATIENT)
Dept: ORTHOPEDIC SURGERY | Facility: CLINIC | Age: 26
End: 2021-12-20

## 2021-12-20 VITALS — WEIGHT: 188 LBS | BODY MASS INDEX: 26.32 KG/M2 | HEIGHT: 71 IN

## 2021-12-20 DIAGNOSIS — M25.531 BILATERAL WRIST PAIN: Primary | ICD-10-CM

## 2021-12-20 DIAGNOSIS — M25.532 BILATERAL WRIST PAIN: Primary | ICD-10-CM

## 2021-12-20 PROCEDURE — 99203 OFFICE O/P NEW LOW 30 MIN: CPT | Performed by: ORTHOPAEDIC SURGERY

## 2021-12-20 NOTE — PROGRESS NOTES
Antione Villaseñor is a 26 y.o. male   Primary provider:  Clint Montgomery APRN       Chief Complaint   Patient presents with   • Left Wrist - Pain   • Right Wrist - Pain   • Establish Care       HISTORY OF PRESENT ILLNESS: Patient being seen for bilateral wrist pain. X-rays done today.     26 RHD M with 1 month B, R>L, hand pain, numbness, and weakness.  Describes a burning/stabbing pain.  +Night pain.  Sxs affect all digits of the hand.  Also describes pain and weakness that involves BUE up to his shoulders.  He also endorses neck pain.  +NSAIDs.  No braces.  +Ice/heat.  +Massage.  No CSIs.  No PT/OT.  No sx.    Pain  This is a new problem. The current episode started more than 1 month ago. The problem occurs intermittently. Associated symptoms comments: Burning, swelling. He has tried acetaminophen and NSAIDs (sitting, driving) for the symptoms.        CONCURRENT MEDICAL HISTORY:    Past Medical History:   Diagnosis Date   • ADHD (attention deficit hyperactivity disorder)    • Ankle joint pain    • Anxiety    • Bipolar disorder (HCC)    • Burning feet syndrome    • Depressive disorder    • Dysuria     Dysuria - OVER ACTIVE BLADDER VS POLYURIA      • Encounter for general adult medical examination without abnormal findings    • Heart murmur    • Infertile male syndrome    • Intervertebral disc disorders with radiculopathy, lumbar region    • Low back pain    • Male hypogonadism     Male hypogonadism - not receiving testosterone replacement by urology      • Memory loss    • Pain in unspecified foot    • Palpitations        No Known Allergies      Current Outpatient Medications:   •  lisdexamfetamine (VYVANSE) 40 MG capsule, Take 40 mg by mouth Daily, Disp: , Rfl:   •  meloxicam (MOBIC) 15 MG tablet, Take 1 tablet by mouth Daily., Disp: 30 tablet, Rfl: 1  •  venlafaxine XR (EFFEXOR-XR) 150 MG 24 hr capsule, Take 150 mg by mouth Daily., Disp: , Rfl:   •  venlafaxine XR (EFFEXOR-XR) 75 MG 24 hr capsule, Take 75 mg  "by mouth Daily., Disp: , Rfl:     Past Surgical History:   Procedure Laterality Date   • COLONOSCOPY N/A 4/19/2019    Procedure: COLONOSCOPY;  Surgeon: Alonso Goodson MD;  Location: [facility] ENDOSCOPY;  Service: Gastroenterology   • ENDOSCOPY N/A 4/19/2019    Procedure: ESOPHAGOGASTRODUODENOSCOPY;  Surgeon: Alonso Goodson MD;  Location: [facility] ENDOSCOPY;  Service: Gastroenterology   • INJECTION OF MEDICATION      Kenalog (1)      10/05/2015       • UPPER GASTROINTESTINAL ENDOSCOPY  04/19/2019       Family History   Problem Relation Age of Onset   • Asthma Other    • Diabetes Other    • Heart disease Other    • Hypertension Other    • Lung disease Other    • Early death Father    • Anuerysm Father         brain   • Bipolar disorder Mother    • Mental illness Mother    • Depression Sister    • No Known Problems Brother    • No Known Problems Daughter    • Mental illness Maternal Grandmother    • Heart disease Maternal Grandmother    • Cancer Maternal Grandfather    • Prostate cancer Maternal Grandfather    • Diabetes Maternal Grandfather    • No Known Problems Paternal Grandmother    • COPD Paternal Grandfather         Social History     Socioeconomic History   • Marital status:    Tobacco Use   • Smoking status: Never Smoker   • Smokeless tobacco: Former User   Substance and Sexual Activity   • Alcohol use: No   • Drug use: No   • Sexual activity: Yes     Partners: Female        Review of Systems   Constitutional: Negative.    HENT: Negative.    Eyes: Negative.    Respiratory: Negative.    Cardiovascular: Negative.    Gastrointestinal: Negative.    Endocrine: Negative.    Genitourinary: Negative.    Musculoskeletal: Negative.    Skin: Negative.    Allergic/Immunologic: Negative.    Neurological: Negative.    Hematological: Negative.    Psychiatric/Behavioral: Negative.        PHYSICAL EXAMINATION:       Ht 180.3 cm (71\")   Wt 85.3 kg (188 lb)   BMI 26.22 kg/m²     Physical Exam    GAIT:     [x]  Normal  [] "  Antalgic    Assistive device: [x]  None  []  Walker     []  Crutches  []  Cane     []  Wheelchair  []  Stretcher    Ortho Exam  NAD  RUE:  No obvious thenar/hypothenar atrophy.  Negative Carpal tunnel Tinel's.  +Durkan's.  +Phalen's.  Negative Guyon's canal Tinel's.  Negative cubital tunnel Tinel's.  Decr sens all digits.  LUE:  No obvious thenar/hypothenar atrophy.  Negative Carpal tunnel Tinel's.  +Durkan's.  +Phalen's.  Negative Guyon's canal Tinel's.  Negative cubital tunnel Tinel's.  Decr sens all digits.    No results found.        ASSESSMENT:    Diagnoses and all orders for this visit:    Bilateral wrist pain          PLAN  26 RHD M with 1 month B hand pain, numbness, and weakness.  He has symptoms of B CTS, but he also has some ulnar-sided sxs and pain/weakness involving BUE up to the shoulders.  This is not presenting like typical CTS.  Will send for B EMG/NCV studies from the neck to hands to r/o nerve compression.  Will also send for a C-spine MRI to r/o cervical stenosis.  He should con't NSAIDs, ice/heat, massage.  Will also try B wrist braces.  RTC after the studies have been completed so we can discuss the findings and any further recommendations.  No follow-ups on file.    Agueda Rider MA

## 2021-12-21 ENCOUNTER — PATIENT ROUNDING (BHMG ONLY) (OUTPATIENT)
Dept: ORTHOPEDIC SURGERY | Facility: CLINIC | Age: 26
End: 2021-12-21

## 2021-12-21 NOTE — PROGRESS NOTES
December 21, 2021    Hello, may I speak with Antione Garcia Kasi?    My name is UZIEL ANG     I am  with [facility]  [address] [place] [address] [phone].    Before we get started may I verify your date of birth? 1995    I am calling to officially welcome you to our practice and ask about your recent visit. Is this a good time to talk? yes    Tell me about your visit with us. What things went well?  EVERYTHING WENT SMOOTH. THEY SEEMED TO GET EVERYONE IN AND OUT AT A GOOD SPEED.        We're always looking for ways to make our patients' experiences even better. Do you have recommendations on ways we may improve?  no    Overall were you satisfied with your first visit to our practice? yes       I appreciate you taking the time to speak with me today. Is there anything else I can do for you? no      Thank you, and have a great day.

## 2021-12-27 ENCOUNTER — HOSPITAL ENCOUNTER (OUTPATIENT)
Dept: MRI IMAGING | Facility: HOSPITAL | Age: 26
Discharge: HOME | End: 2021-12-27

## 2021-12-28 ENCOUNTER — APPOINTMENT (OUTPATIENT)
Dept: MRI IMAGING | Facility: HOSPITAL | Age: 26
End: 2021-12-28

## 2021-12-30 ENCOUNTER — HOSPITAL ENCOUNTER (OUTPATIENT)
Dept: MRI IMAGING | Facility: HOSPITAL | Age: 26
Discharge: HOME | End: 2021-12-30
Admitting: ORTHOPAEDIC SURGERY

## 2021-12-30 DIAGNOSIS — M25.532 BILATERAL WRIST PAIN: ICD-10-CM

## 2021-12-30 DIAGNOSIS — M25.531 BILATERAL WRIST PAIN: ICD-10-CM

## 2021-12-30 PROCEDURE — 72141 MRI NECK SPINE W/O DYE: CPT

## 2022-01-14 ENCOUNTER — TELEPHONE (OUTPATIENT)
Dept: ORTHOPEDIC SURGERY | Facility: CLINIC | Age: 27
End: 2022-01-14

## 2022-01-14 NOTE — TELEPHONE ENCOUNTER
TRIED TO REACH Pt TO SCHEDULE FOLLOW UP FOR MRI RESULTS DONE @ [facility] 12/30/2021    NO ANSWER LEFT VOICEMAIL

## 2022-01-19 ENCOUNTER — OFFICE VISIT (OUTPATIENT)
Dept: ORTHOPEDIC SURGERY | Facility: CLINIC | Age: 27
End: 2022-01-19

## 2022-01-19 VITALS — BODY MASS INDEX: 26.43 KG/M2 | HEART RATE: 102 BPM | WEIGHT: 188.8 LBS | HEIGHT: 71 IN | OXYGEN SATURATION: 94 %

## 2022-01-19 DIAGNOSIS — M25.531 BILATERAL WRIST PAIN: Primary | ICD-10-CM

## 2022-01-19 DIAGNOSIS — M25.532 BILATERAL WRIST PAIN: Primary | ICD-10-CM

## 2022-01-19 PROCEDURE — 99212 OFFICE O/P EST SF 10 MIN: CPT | Performed by: ORTHOPAEDIC SURGERY

## 2022-01-19 RX ORDER — AMOXICILLIN 500 MG/1
TABLET, FILM COATED ORAL
COMMUNITY
Start: 2022-01-03 | End: 2022-05-17

## 2022-01-19 RX ORDER — BUPIVACAINE HCL/0.9 % NACL/PF 0.1 %
2 PLASTIC BAG, INJECTION (ML) EPIDURAL ONCE
Status: CANCELLED | OUTPATIENT
Start: 2022-05-19 | End: 2022-01-19

## 2022-01-19 NOTE — PROGRESS NOTES
"Antione Villaseñor is a 26 y.o. male returns for     Chief Complaint   Patient presents with   • Left Hand - Follow-up   • Right Hand - Follow-up   • Results     emg and mri     Mri bhmd  EMG Ignacia   HISTORY OF PRESENT ILLNESS:    26 RHD M with 2 months B, R>L, hand pain, numbness, and weakness.  He has night pain and his symptoms localize primarily to the radial digits, although he also has sxs in the ulnar nerve distribution.  He has marked weakness.  Recent EMG/NCV study confirms CTS.  He has failed conservative mgmt including NSAIDs, wrist braces given to him at his prior appointment, ice/heat, and massage.       CONCURRENT MEDICAL HISTORY:    The following portions of the patient's history were reviewed and updated as appropriate: allergies, current medications, past family history, past medical history, past social history, past surgical history and problem list.  Symptoms localize to all digits of the hand.  His presentation suggested possible overlap of CTS, cubital tunnel, and/or cervical radiculopathy.  Last seen in clinic 12/20/2022.  At that time, he was sent for EMG/NCV studies as well as C-spine MRI.  Those studies have now been completed and he returns to discuss the findings and further recommendations.  No interval change in his sxs.    ROS  No fevers or chills.  No chest pain or shortness of air.  No GI or  disturbances.    PHYSICAL EXAMINATION:       Pulse 102   Ht 180.3 cm (71\")   Wt 85.6 kg (188 lb 12.8 oz)   SpO2 94%   BMI 26.33 kg/m²     Physical Exam    GAIT:     [x]  Normal  []  Antalgic    Assistive device: [x]  None  []  Walker     []  Crutches  []  Cane     []  Wheelchair  []  Stretcher    Ortho Exam  NAD  B hands:  Exam deferred.    MRI Cervical Spine Without Contrast    Result Date: 12/31/2021  Narrative: EXAM: MRI CERVICAL SPINE WO CONTRAST [facility]Lex: MR CERVICAL SPINE WITHOUT IV CONTRAST HISTORY: r/o cervical stenosis, M25.531 Pain in right wrist M25.532 Pain in left wrist. " COMPARISON: CT cervical spine 11/3/2020 TECHNIQUE: Standard MRI of the cervical spine is performed with spine coil. Sagittal, coronal, and axial short TR/TE, fast spin echo, and inversion recovery sequences are performed. No contrast was utilized. FINDING(S): Craniocervical Junction: Normal Atlantodental Interval: Normal Cord: Normal Vertebra: Normal Paravertebral Soft Tissues: Normal Disc spaces: Disc desiccation at C5-C6 without loss of disc height. Specific discogenic degenerative changes are outlined as follows: C2-C3: Normal. C3-C4:  Normal. C4-C5: Normal. C5-C6: Minimal disc bulge without central canal or foraminal stenosis. No facet arthrosis. C6-C7: Minimal disc bulge. No central canal or foraminal stenosis. No facet arthrosis. C7-T1: Normal.     Impression: Small disc bulges at C5-C6 and C6-C7 without significant central canal or foraminal stenosis Electronically signed by:  Ja Galeas MD  12/31/2021 12:54 AM CST Workstation: [id number]    XR Wrist 3+ View Bilateral    Result Date: 12/20/2021  Narrative: PROCEDURE: Bilateral wrist x-rays with three views each. INDICATION: pain, M25.531 Pain in right wrist M25.532 Pain in left wrist. COMPARISON: None. FINDINGS: No old or acute fractures or acute osseous abnormalities in either wrist. No osseous arthritic changes. No soft tissue abnormalities or calcifications.     Impression: Negative bilateral wrist x-rays. [id number] Electronically signed by:  Deonte Bermudez MD  12/20/2021 2:39 PM CST Workstation: [id number]            ASSESSMENT:    Diagnoses and all orders for this visit:    Bilateral wrist pain    Other orders  -     amoxicillin (AMOXIL) 500 MG tablet; TAKE ONE TABLET BY MOUTH THREE TIMES DAILY UNTIL GONE          PLAN  26 RHD M with B, R>L, CTS.  C-spine MRI reviewed.  This demonstrates small disc bulges at C5-6 and C6-7, but no significant central or foraminal stenosis.  He has a classic presentation of carpal tunnel syndrome with night pain and  symptoms that localize primarily to the radial digits, although he also has sxs in the ulnar nerve distribution.  Durkan's and Phalen's tests are positive.  Recent EMG/NCV study confirms CTS, with moderate B CTS noted, the right side worse than the left.  No obvious evidence of ulnar neuropathy or cervical radiculopathy.  Most concerning, he already notes significant weakness.  And he has failed conservative mgmt including NSAIDs, wrist braces, ice/heat, and massage.  After a detailed conversation with the patient regarding the nature of his condition and possible treatments, he has elected to pursue operative intervention.  The risks, benefits, and alternatives of the procedure have been thoroughly reviewed and the patient had an opportunity to have all of his questions answered.  The risks include, but are not limited to, bleeding, infection, damage to blood vessels or nerves, failure of the procedure, need for another operation, blood clots, stroke, heart attack, and death.  Will plan for R CTR on 2/3/2022.  RTC 2/15/2022.  No follow-ups on file.    Jagruti Rojo, CSA

## 2022-02-01 ENCOUNTER — APPOINTMENT (OUTPATIENT)
Dept: PREADMISSION TESTING | Facility: HOSPITAL | Age: 27
End: 2022-02-01

## 2022-02-01 DIAGNOSIS — G56.03 BILATERAL CARPAL TUNNEL SYNDROME: ICD-10-CM

## 2022-02-01 RX ORDER — MELOXICAM 15 MG/1
15 TABLET ORAL DAILY
Qty: 30 TABLET | Refills: 1 | Status: SHIPPED | OUTPATIENT
Start: 2022-02-01 | End: 2022-04-04

## 2022-04-04 DIAGNOSIS — G56.03 BILATERAL CARPAL TUNNEL SYNDROME: ICD-10-CM

## 2022-04-04 RX ORDER — MELOXICAM 15 MG/1
15 TABLET ORAL DAILY
Qty: 30 TABLET | Refills: 0 | Status: SHIPPED | OUTPATIENT
Start: 2022-04-04 | End: 2022-04-19 | Stop reason: SDUPTHER

## 2022-04-19 ENCOUNTER — HOSPITAL ENCOUNTER (EMERGENCY)
Facility: HOSPITAL | Age: 27
Discharge: HOME | End: 2022-04-19
Attending: FAMILY MEDICINE | Admitting: FAMILY MEDICINE

## 2022-04-19 ENCOUNTER — APPOINTMENT (OUTPATIENT)
Dept: CT IMAGING | Facility: HOSPITAL | Age: 27
End: 2022-04-19

## 2022-04-19 VITALS
HEART RATE: 102 BPM | OXYGEN SATURATION: 98 % | HEIGHT: 70 IN | DIASTOLIC BLOOD PRESSURE: 59 MMHG | SYSTOLIC BLOOD PRESSURE: 133 MMHG | RESPIRATION RATE: 18 BRPM | WEIGHT: 202.4 LBS | BODY MASS INDEX: 28.98 KG/M2 | TEMPERATURE: 98.5 F

## 2022-04-19 DIAGNOSIS — G56.03 BILATERAL CARPAL TUNNEL SYNDROME: ICD-10-CM

## 2022-04-19 DIAGNOSIS — U07.1 COVID-19: Primary | ICD-10-CM

## 2022-04-19 DIAGNOSIS — M79.10 MYALGIA: ICD-10-CM

## 2022-04-19 LAB
ALBUMIN SERPL-MCNC: 4.5 G/DL (ref 3.5–5.2)
ALBUMIN/GLOB SERPL: 2 G/DL
ALP SERPL-CCNC: 46 U/L (ref 39–117)
ALT SERPL W P-5'-P-CCNC: 29 U/L (ref 1–41)
AMPHET+METHAMPHET UR QL: NEGATIVE
AMPHETAMINES UR QL: NEGATIVE
ANION GAP SERPL CALCULATED.3IONS-SCNC: 8 MMOL/L (ref 5–15)
AST SERPL-CCNC: 27 U/L (ref 1–40)
BACTERIA UR QL AUTO: ABNORMAL /HPF
BARBITURATES UR QL SCN: NEGATIVE
BASOPHILS # BLD AUTO: 0.03 10*3/MM3 (ref 0–0.2)
BASOPHILS NFR BLD AUTO: 0.4 % (ref 0–1.5)
BENZODIAZ UR QL SCN: NEGATIVE
BILIRUB SERPL-MCNC: 0.5 MG/DL (ref 0–1.2)
BILIRUB UR QL STRIP: NEGATIVE
BUN SERPL-MCNC: 11 MG/DL (ref 6–20)
BUN/CREAT SERPL: 8.8 (ref 7–25)
BUPRENORPHINE SERPL-MCNC: NEGATIVE NG/ML
CALCIUM SPEC-SCNC: 9.4 MG/DL (ref 8.6–10.5)
CANNABINOIDS SERPL QL: NEGATIVE
CHLORIDE SERPL-SCNC: 99 MMOL/L (ref 98–107)
CK SERPL-CCNC: 301 U/L (ref 20–200)
CLARITY UR: ABNORMAL
CO2 SERPL-SCNC: 27 MMOL/L (ref 22–29)
COCAINE UR QL: NEGATIVE
COLOR UR: ABNORMAL
CREAT SERPL-MCNC: 1.25 MG/DL (ref 0.76–1.27)
D-LACTATE SERPL-SCNC: 1.6 MMOL/L (ref 0.5–2)
DEPRECATED RDW RBC AUTO: 40.8 FL (ref 37–54)
EGFRCR SERPLBLD CKD-EPI 2021: 80.4 ML/MIN/1.73
EOSINOPHIL # BLD AUTO: 0.03 10*3/MM3 (ref 0–0.4)
EOSINOPHIL NFR BLD AUTO: 0.4 % (ref 0.3–6.2)
ERYTHROCYTE [DISTWIDTH] IN BLOOD BY AUTOMATED COUNT: 12.5 % (ref 12.3–15.4)
FLUAV RNA RESP QL NAA+PROBE: NOT DETECTED
FLUBV RNA RESP QL NAA+PROBE: NOT DETECTED
GLOBULIN UR ELPH-MCNC: 2.2 GM/DL
GLUCOSE SERPL-MCNC: 104 MG/DL (ref 65–99)
GLUCOSE UR STRIP-MCNC: NEGATIVE MG/DL
HCT VFR BLD AUTO: 45.5 % (ref 37.5–51)
HGB BLD-MCNC: 15.9 G/DL (ref 13–17.7)
HGB UR QL STRIP.AUTO: NEGATIVE
HOLD SPECIMEN: NORMAL
HYALINE CASTS UR QL AUTO: ABNORMAL /LPF
IMM GRANULOCYTES # BLD AUTO: 0.03 10*3/MM3 (ref 0–0.05)
IMM GRANULOCYTES NFR BLD AUTO: 0.4 % (ref 0–0.5)
KETONES UR QL STRIP: ABNORMAL
LEUKOCYTE ESTERASE UR QL STRIP.AUTO: ABNORMAL
LIPASE SERPL-CCNC: 17 U/L (ref 13–60)
LYMPHOCYTES # BLD AUTO: 0.22 10*3/MM3 (ref 0.7–3.1)
LYMPHOCYTES NFR BLD AUTO: 3 % (ref 19.6–45.3)
MAGNESIUM SERPL-MCNC: 2 MG/DL (ref 1.6–2.6)
MCH RBC QN AUTO: 31.8 PG (ref 26.6–33)
MCHC RBC AUTO-ENTMCNC: 34.9 G/DL (ref 31.5–35.7)
MCV RBC AUTO: 91 FL (ref 79–97)
METHADONE UR QL SCN: NEGATIVE
MONOCYTES # BLD AUTO: 0.86 10*3/MM3 (ref 0.1–0.9)
MONOCYTES NFR BLD AUTO: 11.9 % (ref 5–12)
NEUTROPHILS NFR BLD AUTO: 6.06 10*3/MM3 (ref 1.7–7)
NEUTROPHILS NFR BLD AUTO: 83.9 % (ref 42.7–76)
NITRITE UR QL STRIP: NEGATIVE
NRBC BLD AUTO-RTO: 0 /100 WBC (ref 0–0.2)
OPIATES UR QL: NEGATIVE
OXYCODONE UR QL SCN: NEGATIVE
PCP UR QL SCN: NEGATIVE
PH UR STRIP.AUTO: 7 [PH] (ref 5–9)
PLATELET # BLD AUTO: 209 10*3/MM3 (ref 140–450)
PMV BLD AUTO: 9.9 FL (ref 6–12)
POTASSIUM SERPL-SCNC: 3.9 MMOL/L (ref 3.5–5.2)
PROPOXYPH UR QL: NEGATIVE
PROT SERPL-MCNC: 6.7 G/DL (ref 6–8.5)
PROT UR QL STRIP: ABNORMAL
RBC # BLD AUTO: 5 10*6/MM3 (ref 4.14–5.8)
RBC # UR STRIP: ABNORMAL /HPF
REF LAB TEST METHOD: ABNORMAL
SARS-COV-2 RNA RESP QL NAA+PROBE: DETECTED
SODIUM SERPL-SCNC: 134 MMOL/L (ref 136–145)
SP GR UR STRIP: 1.03 (ref 1–1.03)
SQUAMOUS #/AREA URNS HPF: ABNORMAL /HPF
TRICYCLICS UR QL SCN: NEGATIVE
UROBILINOGEN UR QL STRIP: ABNORMAL
WBC # UR STRIP: ABNORMAL /HPF
WBC NRBC COR # BLD: 7.23 10*3/MM3 (ref 3.4–10.8)

## 2022-04-19 PROCEDURE — 82550 ASSAY OF CK (CPK): CPT | Performed by: FAMILY MEDICINE

## 2022-04-19 PROCEDURE — 80306 DRUG TEST PRSMV INSTRMNT: CPT | Performed by: FAMILY MEDICINE

## 2022-04-19 PROCEDURE — 81001 URINALYSIS AUTO W/SCOPE: CPT | Performed by: FAMILY MEDICINE

## 2022-04-19 PROCEDURE — 83735 ASSAY OF MAGNESIUM: CPT | Performed by: FAMILY MEDICINE

## 2022-04-19 PROCEDURE — 99283 EMERGENCY DEPT VISIT LOW MDM: CPT

## 2022-04-19 PROCEDURE — 80053 COMPREHEN METABOLIC PANEL: CPT | Performed by: FAMILY MEDICINE

## 2022-04-19 PROCEDURE — 83690 ASSAY OF LIPASE: CPT | Performed by: FAMILY MEDICINE

## 2022-04-19 PROCEDURE — 83605 ASSAY OF LACTIC ACID: CPT | Performed by: FAMILY MEDICINE

## 2022-04-19 PROCEDURE — 87040 BLOOD CULTURE FOR BACTERIA: CPT | Performed by: FAMILY MEDICINE

## 2022-04-19 PROCEDURE — 36415 COLL VENOUS BLD VENIPUNCTURE: CPT

## 2022-04-19 PROCEDURE — 96374 THER/PROPH/DIAG INJ IV PUSH: CPT

## 2022-04-19 PROCEDURE — 25010000002 KETOROLAC TROMETHAMINE PER 15 MG: Performed by: FAMILY MEDICINE

## 2022-04-19 PROCEDURE — 87636 SARSCOV2 & INF A&B AMP PRB: CPT | Performed by: FAMILY MEDICINE

## 2022-04-19 PROCEDURE — 74176 CT ABD & PELVIS W/O CONTRAST: CPT

## 2022-04-19 PROCEDURE — 85025 COMPLETE CBC W/AUTO DIFF WBC: CPT | Performed by: FAMILY MEDICINE

## 2022-04-19 RX ORDER — MELOXICAM 15 MG/1
15 TABLET ORAL DAILY
Qty: 30 TABLET | Refills: 0 | Status: SHIPPED | OUTPATIENT
Start: 2022-04-19 | End: 2022-06-01

## 2022-04-19 RX ORDER — SODIUM CHLORIDE 9 MG/ML
125 INJECTION, SOLUTION INTRAVENOUS CONTINUOUS
Status: DISCONTINUED | OUTPATIENT
Start: 2022-04-19 | End: 2022-04-19 | Stop reason: HOSPADM

## 2022-04-19 RX ORDER — KETOROLAC TROMETHAMINE 15 MG/ML
15 INJECTION, SOLUTION INTRAMUSCULAR; INTRAVENOUS ONCE
Status: COMPLETED | OUTPATIENT
Start: 2022-04-19 | End: 2022-04-19

## 2022-04-19 RX ORDER — ONDANSETRON 4 MG/1
4 TABLET, ORALLY DISINTEGRATING ORAL EVERY 6 HOURS PRN
Qty: 10 TABLET | Refills: 0 | Status: SHIPPED | OUTPATIENT
Start: 2022-04-19 | End: 2022-05-17

## 2022-04-19 RX ADMIN — SODIUM CHLORIDE 1000 ML: 9 INJECTION, SOLUTION INTRAVENOUS at 08:01

## 2022-04-19 RX ADMIN — KETOROLAC TROMETHAMINE 15 MG: 15 INJECTION, SOLUTION INTRAMUSCULAR; INTRAVENOUS at 08:02

## 2022-04-19 NOTE — ED NOTES
"Pt lying on left side with knees drawn up; pt states \"don't want to unfold because of the pain\"; pt trembling, pt states \"I don't know why I am shaking except because of the pain\". Warm blanket provided. Family at bedside.   "

## 2022-04-19 NOTE — ED NOTES
"Pt reports \"back pain and abdomen pain and shaking since last night\"; pt reports vomiting x1 this morning from the pain; pt reports that he \"only peed once and didn't have a bowel movement yesterday\"; denies fever, chills, sob, diarrhea.  "

## 2022-04-19 NOTE — ED PROVIDER NOTES
Subjective   Bck pain began yesterday, nontraumatic. Chills, abdominal pain  LBM 2 days ago, decreased urine output. Pt states that he urinates ivonne min 40 x day and has BM x 5 daily      Back Pain  Location:  Generalized  Quality:  Aching  Radiates to:  Does not radiate  Pain severity:  Moderate  Duration:  1 day  Timing:  Constant  Progression:  Waxing and waning  Chronicity:  New  Associated symptoms: abdominal pain    Associated symptoms: no chest pain, no dysuria, no fever, no headaches and no weakness    Abdominal Pain  Pain location:  L flank and R flank  Pain quality: aching    Pain severity:  Moderate  Duration:  1 day  Timing:  Constant  Progression:  Worsening  Associated symptoms: chills and fatigue    Associated symptoms: no chest pain, no cough, no diarrhea, no dysuria, no fever, no nausea, no shortness of breath, no sore throat and no vomiting        Review of Systems   Constitutional: Positive for activity change, chills and fatigue. Negative for appetite change, diaphoresis and fever.   HENT: Negative for congestion, ear discharge, ear pain, nosebleeds, rhinorrhea, sinus pressure, sore throat and trouble swallowing.    Eyes: Negative for discharge and redness.   Respiratory: Negative for apnea, cough, chest tightness, shortness of breath and wheezing.    Cardiovascular: Negative for chest pain.   Gastrointestinal: Positive for abdominal pain. Negative for diarrhea, nausea and vomiting.   Endocrine: Negative for polyuria.   Genitourinary: Negative for dysuria, frequency and urgency.   Musculoskeletal: Positive for back pain. Negative for myalgias and neck pain.   Skin: Negative for color change and rash.   Allergic/Immunologic: Negative for immunocompromised state.   Neurological: Negative for dizziness, seizures, syncope, weakness, light-headedness and headaches.   Hematological: Negative for adenopathy. Does not bruise/bleed easily.   Psychiatric/Behavioral: Negative for behavioral problems and  confusion.   All other systems reviewed and are negative.      Past Medical History:   Diagnosis Date   • ADHD (attention deficit hyperactivity disorder)    • Ankle joint pain    • Anxiety    • Bipolar disorder (HCC)    • Burning feet syndrome    • Depressive disorder    • Dysuria     Dysuria - OVER ACTIVE BLADDER VS POLYURIA      • Encounter for general adult medical examination without abnormal findings    • Heart murmur    • Infertile male syndrome    • Intervertebral disc disorders with radiculopathy, lumbar region    • Low back pain    • Male hypogonadism     Male hypogonadism - not receiving testosterone replacement by urology      • Memory loss    • Pain in unspecified foot    • Palpitations        No Known Allergies    Past Surgical History:   Procedure Laterality Date   • COLONOSCOPY N/A 4/19/2019    Procedure: COLONOSCOPY;  Surgeon: Alonso Goodson MD;  Location: [facility] ENDOSCOPY;  Service: Gastroenterology   • ENDOSCOPY N/A 4/19/2019    Procedure: ESOPHAGOGASTRODUODENOSCOPY;  Surgeon: Alonso Goodson MD;  Location: [facility] ENDOSCOPY;  Service: Gastroenterology   • INJECTION OF MEDICATION      Kenalog (1)      10/05/2015       • UPPER GASTROINTESTINAL ENDOSCOPY  04/19/2019       Family History   Problem Relation Age of Onset   • Asthma Other    • Diabetes Other    • Heart disease Other    • Hypertension Other    • Lung disease Other    • Early death Father    • Anuerysm Father         brain   • Bipolar disorder Mother    • Mental illness Mother    • Depression Sister    • No Known Problems Brother    • No Known Problems Daughter    • Mental illness Maternal Grandmother    • Heart disease Maternal Grandmother    • Cancer Maternal Grandfather    • Prostate cancer Maternal Grandfather    • Diabetes Maternal Grandfather    • No Known Problems Paternal Grandmother    • COPD Paternal Grandfather        Social History     Socioeconomic History   • Marital status:    Tobacco Use   • Smoking status: Never  Smoker   • Smokeless tobacco: Former User   Substance and Sexual Activity   • Alcohol use: No   • Drug use: No   • Sexual activity: Yes     Partners: Female           Objective   Physical Exam  Vitals and nursing note reviewed.   Constitutional:       Appearance: He is well-developed.   HENT:      Head: Normocephalic and atraumatic.      Nose: Nose normal.   Eyes:      General: No scleral icterus.        Right eye: No discharge.         Left eye: No discharge.      Conjunctiva/sclera: Conjunctivae normal.      Pupils: Pupils are equal, round, and reactive to light.   Neck:      Trachea: No tracheal deviation.   Cardiovascular:      Rate and Rhythm: Regular rhythm. Tachycardia present.      Heart sounds: Normal heart sounds. No murmur heard.  Pulmonary:      Effort: Pulmonary effort is normal. No respiratory distress.      Breath sounds: Normal breath sounds. No stridor. No wheezing or rales.   Abdominal:      General: Bowel sounds are normal. There is no distension.      Palpations: Abdomen is soft. There is no mass.      Tenderness: There is generalized abdominal tenderness. There is no guarding or rebound.   Musculoskeletal:      Cervical back: Normal range of motion and neck supple.      Thoracic back: Tenderness present.      Lumbar back: Tenderness present.      Comments: generalized back pain   Skin:     General: Skin is warm and dry.      Findings: No erythema or rash.   Neurological:      Mental Status: He is alert and oriented to person, place, and time.      Coordination: Coordination normal.   Psychiatric:         Behavior: Behavior normal.         Thought Content: Thought content normal.         Procedures           ED Course             Labs Reviewed   COVID-19 AND FLU A/B, NP SWAB IN TRANSPORT MEDIA 8-12 HR TAT - Abnormal; Notable for the following components:       Result Value    COVID19 Detected (*)     All other components within normal limits    Narrative:     Fact sheet for providers:  https://www.fda.gov/media/158493/download    Fact sheet for patients: https://www.fda.gov/media/735512/download    Test performed by PCR.  Influenza A and Influenza B negative results should be considered presumptive in samples that have a positive SARS-CoV-2 result.    Competitive inhibition studies showed that SARS-CoV-2 virus, when present at concentrations above 3.6E+04 copies/mL, can inhibit the detection and amplification of influenza A and influenza B virus RNA if present at or below 1.8E+02 copies/mL or 4.9E+02 copies/mL, respectively, and may lead to false negative influenza virus results. If co-infection with influenza A or influenza B virus is suspected in samples with a positive SARS-CoV-2 result, the sample should be re-tested with another FDA cleared, approved, or authorized influenza test, if influenza virus detection would change clinical management.   COMPREHENSIVE METABOLIC PANEL - Abnormal; Notable for the following components:    Glucose 104 (*)     Sodium 134 (*)     All other components within normal limits    Narrative:     GFR Normal >60  Chronic Kidney Disease <60  Kidney Failure <15     URINALYSIS W/ CULTURE IF INDICATED - Abnormal; Notable for the following components:    Appearance, UA Cloudy (*)     Specific Gravity, UA 1.032 (*)     Ketones, UA Trace (*)     Protein, UA 30 mg/dL (1+) (*)     Leuk Esterase, UA Trace (*)     All other components within normal limits   CK - Abnormal; Notable for the following components:    Creatine Kinase 301 (*)     All other components within normal limits   CBC WITH AUTO DIFFERENTIAL - Abnormal; Notable for the following components:    Neutrophil % 83.9 (*)     Lymphocyte % 3.0 (*)     Lymphocytes, Absolute 0.22 (*)     All other components within normal limits   URINALYSIS, MICROSCOPIC ONLY - Abnormal; Notable for the following components:    RBC, UA 0-2 (*)     All other components within normal limits   LIPASE - Normal   MAGNESIUM - Normal   LACTIC  ACID, PLASMA - Normal   URINE DRUG SCREEN - Normal    Narrative:     Cutoff For Drugs Screened:    Amphetamines               500 ng/ml  Barbiturates               200 ng/ml  Benzodiazepines            150 ng/ml  Cocaine                    150 ng/ml  Methadone                  200 ng/ml  Opiates                    100 ng/ml  Phencyclidine               25 ng/ml  THC                            50 ng/ml  Methamphetamine            500 ng/ml  Tricyclic Antidepressants  300 ng/ml  Oxycodone                  100 ng/ml  Propoxyphene               300 ng/ml  Buprenorphine               10 ng/ml    The normal value for all drugs tested is negative. This report includes unconfirmed screening results, with the cutoff values listed, to be used for medical treatment purposes only.  Unconfirmed results must not be used for non-medical purposes such as employment or legal testing.  Clinical consideration should be applied to any drug of abuse test, particularly when unconfirmed results are used.     BLOOD CULTURE   BLOOD CULTURE   CBC AND DIFFERENTIAL    Narrative:     The following orders were created for panel order CBC & Differential.  Procedure                               Abnormality         Status                     ---------                               -----------         ------                     CBC Auto Differential[[id number]]        Abnormal            Final result                 Please view results for these tests on the individual orders.   EXTRA TUBES    Narrative:     The following orders were created for panel order Extra Tubes.  Procedure                               Abnormality         Status                     ---------                               -----------         ------                     Gold Top - SST[[id number]]                                   Final result                 Please view results for these tests on the individual orders.   GOLD TOP - SST       CT Abdomen Pelvis Without Contrast    Final Result         1. No urinary calcifications or hydronephrosis.   2. Negative appendix.   3. No evidence for mechanical bowel obstruction.            Electronically signed by:  Oscar Arenas MD  4/19/2022 8:40 AM CDT   Workstation: [id number]                                                [facility]    Final diagnoses:   COVID-19   Myalgia       ED Disposition  ED Disposition     ED Disposition   Discharge    Condition   Stable    Comment   --             Clint Montgomery, APRN  [address] [place] [place]  [place] [id number] [phone]    In 2 days           Medication List      New Prescriptions    ondansetron ODT 4 MG disintegrating tablet  Commonly known as: ZOFRAN-ODT  Place 1 tablet on the tongue Every 6 (Six) Hours As Needed for Nausea or Vomiting.           Where to Get Your Medications      These medications were sent to [id number] - [place] [id number] [facility] AT [facility] - [phone]  - [phone]   [address] [address]    Phone: [phone]   · meloxicam 15 MG tablet  · ondansetron ODT 4 MG disintegrating tablet          Davon Peck MD  04/19/22 2022

## 2022-04-24 LAB
BACTERIA SPEC AEROBE CULT: NORMAL
BACTERIA SPEC AEROBE CULT: NORMAL

## 2022-05-17 ENCOUNTER — LAB (OUTPATIENT)
Dept: LAB | Facility: HOSPITAL | Age: 27
End: 2022-05-17

## 2022-05-17 ENCOUNTER — PRE-ADMISSION TESTING (OUTPATIENT)
Dept: PREADMISSION TESTING | Facility: HOSPITAL | Age: 27
End: 2022-05-17

## 2022-05-17 VITALS
HEIGHT: 71 IN | WEIGHT: 195 LBS | RESPIRATION RATE: 16 BRPM | BODY MASS INDEX: 27.3 KG/M2 | HEART RATE: 90 BPM | OXYGEN SATURATION: 98 %

## 2022-05-17 PROCEDURE — 93005 ELECTROCARDIOGRAM TRACING: CPT | Performed by: ANESTHESIOLOGY

## 2022-05-17 PROCEDURE — 93010 ELECTROCARDIOGRAM REPORT: CPT | Performed by: INTERNAL MEDICINE

## 2022-05-17 RX ORDER — SODIUM CHLORIDE, SODIUM GLUCONATE, SODIUM ACETATE, POTASSIUM CHLORIDE AND MAGNESIUM CHLORIDE 526; 502; 368; 37; 30 MG/100ML; MG/100ML; MG/100ML; MG/100ML; MG/100ML
1000 INJECTION, SOLUTION INTRAVENOUS CONTINUOUS PRN
Status: CANCELLED | OUTPATIENT
Start: 2022-05-19

## 2022-05-18 LAB
QT INTERVAL: 334 MS
QTC INTERVAL: 392 MS

## 2022-06-01 ENCOUNTER — OFFICE VISIT (OUTPATIENT)
Dept: FAMILY MEDICINE CLINIC | Facility: CLINIC | Age: 27
End: 2022-06-01

## 2022-06-01 VITALS
DIASTOLIC BLOOD PRESSURE: 90 MMHG | TEMPERATURE: 97.1 F | RESPIRATION RATE: 18 BRPM | HEART RATE: 75 BPM | OXYGEN SATURATION: 100 % | SYSTOLIC BLOOD PRESSURE: 130 MMHG | WEIGHT: 194.5 LBS | BODY MASS INDEX: 27.23 KG/M2 | HEIGHT: 71 IN

## 2022-06-01 DIAGNOSIS — H57.11 ACUTE RIGHT EYE PAIN: ICD-10-CM

## 2022-06-01 DIAGNOSIS — G56.03 BILATERAL CARPAL TUNNEL SYNDROME: Primary | ICD-10-CM

## 2022-06-01 PROCEDURE — 99213 OFFICE O/P EST LOW 20 MIN: CPT | Performed by: NURSE PRACTITIONER

## 2022-06-01 RX ORDER — OFLOXACIN 3 MG/ML
1 SOLUTION/ DROPS OPHTHALMIC 4 TIMES DAILY
Qty: 5 ML | Refills: 0 | Status: SHIPPED | OUTPATIENT
Start: 2022-06-01 | End: 2022-07-19

## 2022-06-01 NOTE — PROGRESS NOTES
Subjective   Antione Villaseñor is a 26 y.o. male.     CC: Bilateral carpal tunnel, eye pain    Hand Pain   The incident occurred more than 1 week ago. There was no injury mechanism. The pain is present in the left fingers, left hand, right hand and right fingers. The pain does not radiate. The pain is moderate. The pain has been intermittent since the incident. Associated symptoms include numbness and tingling. Pertinent negatives include no chest pain. The symptoms are aggravated by lifting and movement. He has tried NSAIDs, rest and immobilization for the symptoms. The treatment provided no relief.   Eye Problem   The right eye is affected. This is a new problem. The current episode started yesterday. The problem occurs constantly. The problem has been unchanged. The injury mechanism was a foreign body (experienced eye pain while using a  yesterday. ). The pain is at a severity of 5/10. The pain is moderate. There is no known exposure to pink eye. He does not wear contacts. Associated symptoms include blurred vision, eye redness, a foreign body sensation and tingling. Pertinent negatives include no eye discharge, double vision, fever, itching, nausea, photophobia, recent URI, vomiting or weakness. He has tried nothing for the symptoms. The treatment provided no relief.        The following portions of the patient's history were reviewed and updated as appropriate: allergies, current medications, past family history, past medical history, past social history, past surgical history and problem list.    Review of Systems   Constitutional: Negative for activity change, appetite change, chills, fatigue, fever, unexpected weight gain and unexpected weight loss.   HENT: Negative for congestion, sore throat, trouble swallowing and voice change.    Eyes: Positive for blurred vision, pain and redness. Negative for double vision, photophobia, discharge, itching and visual disturbance.   Respiratory: Negative  for cough, chest tightness, shortness of breath and wheezing.    Cardiovascular: Negative for chest pain, palpitations and leg swelling.   Gastrointestinal: Negative for abdominal pain, diarrhea, nausea and vomiting.   Endocrine: Negative.  Negative for cold intolerance, heat intolerance, polydipsia, polyphagia and polyuria.   Genitourinary: Negative for dysuria, hematuria and urgency.   Musculoskeletal: Negative for arthralgias and myalgias.   Skin: Negative for rash.   Neurological: Positive for tingling and numbness. Negative for dizziness, weakness, light-headedness and headache.   Hematological: Negative.    Psychiatric/Behavioral: Negative.        Objective   Physical Exam  Vitals and nursing note reviewed.   Constitutional:       General: He is not in acute distress.     Appearance: Normal appearance. He is well-developed and normal weight. He is not ill-appearing, toxic-appearing or diaphoretic.   HENT:      Head: Normocephalic and atraumatic.   Eyes:      General: Lids are everted, no foreign bodies appreciated. Gaze aligned appropriately.         Right eye: No foreign body, discharge or hordeolum.      Extraocular Movements:      Right eye: Normal extraocular motion and no nystagmus.      Conjunctiva/sclera:      Right eye: Right conjunctiva is injected. No chemosis, exudate or hemorrhage.    Cardiovascular:      Rate and Rhythm: Normal rate and regular rhythm.      Heart sounds: Normal heart sounds. No murmur heard.    No friction rub. No gallop.   Pulmonary:      Effort: Pulmonary effort is normal. No respiratory distress.      Breath sounds: Normal breath sounds. No stridor. No wheezing, rhonchi or rales.   Abdominal:      General: Bowel sounds are normal. There is no distension.      Palpations: Abdomen is soft. There is no mass.      Tenderness: There is no abdominal tenderness. There is no guarding or rebound.      Hernia: No hernia is present.   Musculoskeletal:         General: No tenderness.  Normal range of motion.      Cervical back: Normal range of motion.   Skin:     General: Skin is warm and dry.      Coloration: Skin is not pale.      Findings: No erythema or rash.   Neurological:      Mental Status: He is alert and oriented to person, place, and time.   Psychiatric:         Mood and Affect: Mood normal.         Behavior: Behavior normal.         Thought Content: Thought content normal.         Judgment: Judgment normal.           Assessment & Plan   Diagnoses and all orders for this visit:    1. Bilateral carpal tunnel syndrome (Primary)  -     Ambulatory Referral to Orthopedic Surgery, follow-up as scheduled.     2. Acute right eye pain  -     Ambulatory Referral to Ophthalmology  -     ofloxacin (Ocuflox) 0.3 % ophthalmic solution; Administer 1 drop to the right eye 4 (Four) Times a Day.  Dispense: 5 mL; Refill: 0   -No foreign body appreciated on exam.  We will treat with ophthalmic ofloxacin as noted above.  Patient has foreign body sensation.  Referral placed to ophthalmology for further more in-depth evaluation.    3.  Follow-up as scheduled or sooner for any acute needs.            This document has been electronically signed by LISBETH Gutierrez on June 1, 2022 11:23 CDT

## 2022-07-19 ENCOUNTER — LAB (OUTPATIENT)
Dept: LAB | Facility: HOSPITAL | Age: 27
End: 2022-07-19

## 2022-07-19 ENCOUNTER — OFFICE VISIT (OUTPATIENT)
Dept: FAMILY MEDICINE CLINIC | Facility: CLINIC | Age: 27
End: 2022-07-19

## 2022-07-19 VITALS
BODY MASS INDEX: 27.76 KG/M2 | OXYGEN SATURATION: 98 % | DIASTOLIC BLOOD PRESSURE: 102 MMHG | HEART RATE: 123 BPM | HEIGHT: 71 IN | SYSTOLIC BLOOD PRESSURE: 152 MMHG | WEIGHT: 198.3 LBS

## 2022-07-19 DIAGNOSIS — Z00.00 ANNUAL PHYSICAL EXAM: Primary | ICD-10-CM

## 2022-07-19 DIAGNOSIS — I10 PRIMARY HYPERTENSION: ICD-10-CM

## 2022-07-19 DIAGNOSIS — Z00.00 ANNUAL PHYSICAL EXAM: ICD-10-CM

## 2022-07-19 LAB
QT INTERVAL: 304 MS
QTC INTERVAL: 411 MS

## 2022-07-19 PROCEDURE — 93010 ELECTROCARDIOGRAM REPORT: CPT | Performed by: INTERNAL MEDICINE

## 2022-07-19 PROCEDURE — 85025 COMPLETE CBC W/AUTO DIFF WBC: CPT

## 2022-07-19 PROCEDURE — 82088 ASSAY OF ALDOSTERONE: CPT

## 2022-07-19 PROCEDURE — 84443 ASSAY THYROID STIM HORMONE: CPT

## 2022-07-19 PROCEDURE — 36415 COLL VENOUS BLD VENIPUNCTURE: CPT

## 2022-07-19 PROCEDURE — 83835 ASSAY OF METANEPHRINES: CPT

## 2022-07-19 PROCEDURE — 84439 ASSAY OF FREE THYROXINE: CPT

## 2022-07-19 PROCEDURE — 83036 HEMOGLOBIN GLYCOSYLATED A1C: CPT

## 2022-07-19 PROCEDURE — 84244 ASSAY OF RENIN: CPT

## 2022-07-19 PROCEDURE — 99214 OFFICE O/P EST MOD 30 MIN: CPT | Performed by: NURSE PRACTITIONER

## 2022-07-19 PROCEDURE — 93005 ELECTROCARDIOGRAM TRACING: CPT | Performed by: NURSE PRACTITIONER

## 2022-07-19 PROCEDURE — 84403 ASSAY OF TOTAL TESTOSTERONE: CPT

## 2022-07-19 PROCEDURE — 80053 COMPREHEN METABOLIC PANEL: CPT

## 2022-07-19 PROCEDURE — 84402 ASSAY OF FREE TESTOSTERONE: CPT

## 2022-07-19 PROCEDURE — 80061 LIPID PANEL: CPT

## 2022-07-19 RX ORDER — VENLAFAXINE HYDROCHLORIDE 150 MG/1
150 CAPSULE, EXTENDED RELEASE ORAL DAILY
COMMUNITY

## 2022-07-19 RX ORDER — LISDEXAMFETAMINE DIMESYLATE 50 MG
CAPSULE ORAL
COMMUNITY
Start: 2022-07-17

## 2022-07-19 NOTE — PROGRESS NOTES
Subjective   Antione Villaseñor is a 26 y.o. male.     CC: Hypertension    Hypertension  This is a new problem. The current episode started more than 1 month ago. The problem is unchanged. The problem is uncontrolled. Pertinent negatives include no chest pain, headaches, palpitations or shortness of breath. Risk factors for coronary artery disease include family history and male gender. Current antihypertension treatment includes nothing. There are no compliance problems.  There is no history of angina, kidney disease or CAD/MI. Identifiable causes of hypertension include a hypertension causing med (has been on for years and has not caused htn in the past). There is no history of chronic renal disease or a thyroid problem.        The following portions of the patient's history were reviewed and updated as appropriate: allergies, current medications, past family history, past medical history, past social history, past surgical history and problem list.    Review of Systems   Constitutional: Negative for activity change, appetite change, chills, fatigue, fever, unexpected weight gain and unexpected weight loss.   HENT: Negative for congestion, sore throat, trouble swallowing and voice change.    Eyes: Negative.    Respiratory: Negative for cough, chest tightness, shortness of breath and wheezing.    Cardiovascular: Negative for chest pain, palpitations and leg swelling.   Gastrointestinal: Negative for abdominal pain, diarrhea, nausea and vomiting.   Endocrine: Negative.  Negative for cold intolerance, heat intolerance, polydipsia, polyphagia and polyuria.   Genitourinary: Negative for dysuria, hematuria and urgency.   Musculoskeletal: Negative for arthralgias and myalgias.   Skin: Negative for rash.   Neurological: Negative for dizziness, weakness, light-headedness and headache.   Hematological: Negative.    Psychiatric/Behavioral: Negative.        Objective   Physical Exam  Vitals and nursing note reviewed.    Constitutional:       General: He is not in acute distress.     Appearance: Normal appearance. He is well-developed and normal weight. He is not ill-appearing, toxic-appearing or diaphoretic.   HENT:      Head: Normocephalic and atraumatic.   Eyes:      Conjunctiva/sclera: Conjunctivae normal.   Cardiovascular:      Rate and Rhythm: Regular rhythm. Tachycardia present.      Heart sounds: Normal heart sounds. No murmur heard.    No friction rub. No gallop.   Pulmonary:      Effort: Pulmonary effort is normal. No respiratory distress.      Breath sounds: Normal breath sounds. No wheezing, rhonchi or rales.   Abdominal:      General: Bowel sounds are normal. There is no distension.      Palpations: Abdomen is soft. There is no mass.      Tenderness: There is no abdominal tenderness. There is no guarding or rebound.      Hernia: No hernia is present.   Musculoskeletal:         General: No tenderness. Normal range of motion.      Cervical back: Normal range of motion.   Skin:     General: Skin is warm and dry.      Coloration: Skin is not pale.      Findings: No erythema or rash.   Neurological:      Mental Status: He is alert and oriented to person, place, and time.   Psychiatric:         Mood and Affect: Mood normal.         Behavior: Behavior normal.         Thought Content: Thought content normal.         Judgment: Judgment normal.           Assessment & Plan   Diagnoses and all orders for this visit:    1. Annual physical exam (Primary)  -     CBC & Differential; Future  -     Comprehensive Metabolic Panel; Future  -     Hemoglobin A1c; Future  -     Lipid Panel; Future  -     TSH; Future  -     T4, Free; Future  -     Metanephrines, Frac. Free, Plasma; Future  -     Aldosterone / Renin Ratio; Future  -     ECG 12 Lead, will call with results.     2. Primary hypertension  -     CBC & Differential; Future  -     Comprehensive Metabolic Panel; Future  -     TSH; Future  -     T4, Free; Future  -     Metanephrines,  Frac. Free, Plasma; Future  -     Aldosterone / Renin Ratio; Future  -     ECG 12 Lead  -     Testosterone (Free & Total), LC / MS; Future   - Patient has no previous history of hypertension.  Patient first noticed hypertension as his blood pressure was checked at his psychiatrist office and it has been elevated there.  Blood pressure today 152/102 with a heart rate of 123.  He is asymptomatic today.  Patient reports he feels well and has no symptoms of high blood pressure or tachycardia.  His EKG shows sinus tachycardia.  He is on stimulant medication for ADHD but he has been on this for years and it has not caused hypertension or elevated heart rate to this point.  He is taking no new medications.  He had a urine drug screen a few months ago which showed no illicit substances.  He denies chest pain, shortness of breath or palpitations.  He has 2 small caffeinated drinks a day which she has also done for a long time so likely is not a causative factor.  We will draw multiple labs as noted above and call with results.  Most of his labs will be back in the morning and if there is no clear alternative to his hypertension we will start medication and continue to evaluate.  Instructed patient that we would likely start a beta-blocker considering his heart rate is also high.  Further plan of care pending lab results.  Patient verbalized understanding of instruction and agrees with plan of care.    3.  Further plan of care and follow-up pending lab results.            This document has been electronically signed by LISBETH Gutierrez on July 19, 2022 16:18 CDT

## 2022-07-20 LAB
ALBUMIN SERPL-MCNC: 4.7 G/DL (ref 3.5–5.2)
ALBUMIN/GLOB SERPL: 2 G/DL
ALP SERPL-CCNC: 49 U/L (ref 39–117)
ALT SERPL W P-5'-P-CCNC: 42 U/L (ref 1–41)
ANION GAP SERPL CALCULATED.3IONS-SCNC: 12.2 MMOL/L (ref 5–15)
AST SERPL-CCNC: 37 U/L (ref 1–40)
BASOPHILS # BLD AUTO: 0.03 10*3/MM3 (ref 0–0.2)
BASOPHILS NFR BLD AUTO: 0.4 % (ref 0–1.5)
BILIRUB SERPL-MCNC: 0.5 MG/DL (ref 0–1.2)
BUN SERPL-MCNC: 10 MG/DL (ref 6–20)
BUN/CREAT SERPL: 8.7 (ref 7–25)
CALCIUM SPEC-SCNC: 9.5 MG/DL (ref 8.6–10.5)
CHLORIDE SERPL-SCNC: 101 MMOL/L (ref 98–107)
CHOLEST SERPL-MCNC: 205 MG/DL (ref 0–200)
CO2 SERPL-SCNC: 26.8 MMOL/L (ref 22–29)
CREAT SERPL-MCNC: 1.15 MG/DL (ref 0.76–1.27)
DEPRECATED RDW RBC AUTO: 41.1 FL (ref 37–54)
EGFRCR SERPLBLD CKD-EPI 2021: 88.9 ML/MIN/1.73
EOSINOPHIL # BLD AUTO: 0.04 10*3/MM3 (ref 0–0.4)
EOSINOPHIL NFR BLD AUTO: 0.5 % (ref 0.3–6.2)
ERYTHROCYTE [DISTWIDTH] IN BLOOD BY AUTOMATED COUNT: 12 % (ref 12.3–15.4)
GLOBULIN UR ELPH-MCNC: 2.4 GM/DL
GLUCOSE SERPL-MCNC: 84 MG/DL (ref 65–99)
HBA1C MFR BLD: 5.3 % (ref 4.8–5.6)
HCT VFR BLD AUTO: 50.9 % (ref 37.5–51)
HDLC SERPL-MCNC: 41 MG/DL (ref 40–60)
HGB BLD-MCNC: 17.1 G/DL (ref 13–17.7)
IMM GRANULOCYTES # BLD AUTO: 0.01 10*3/MM3 (ref 0–0.05)
IMM GRANULOCYTES NFR BLD AUTO: 0.1 % (ref 0–0.5)
LDLC SERPL CALC-MCNC: 120 MG/DL (ref 0–100)
LDLC/HDLC SERPL: 2.77 {RATIO}
LYMPHOCYTES # BLD AUTO: 1.56 10*3/MM3 (ref 0.7–3.1)
LYMPHOCYTES NFR BLD AUTO: 19.8 % (ref 19.6–45.3)
MCH RBC QN AUTO: 31.3 PG (ref 26.6–33)
MCHC RBC AUTO-ENTMCNC: 33.6 G/DL (ref 31.5–35.7)
MCV RBC AUTO: 93.2 FL (ref 79–97)
MONOCYTES # BLD AUTO: 0.67 10*3/MM3 (ref 0.1–0.9)
MONOCYTES NFR BLD AUTO: 8.5 % (ref 5–12)
NEUTROPHILS NFR BLD AUTO: 5.57 10*3/MM3 (ref 1.7–7)
NEUTROPHILS NFR BLD AUTO: 70.7 % (ref 42.7–76)
NRBC BLD AUTO-RTO: 0 /100 WBC (ref 0–0.2)
PLATELET # BLD AUTO: 269 10*3/MM3 (ref 140–450)
PMV BLD AUTO: 10.8 FL (ref 6–12)
POTASSIUM SERPL-SCNC: 3.9 MMOL/L (ref 3.5–5.2)
PROT SERPL-MCNC: 7.1 G/DL (ref 6–8.5)
RBC # BLD AUTO: 5.46 10*6/MM3 (ref 4.14–5.8)
SODIUM SERPL-SCNC: 140 MMOL/L (ref 136–145)
T4 FREE SERPL-MCNC: 0.88 NG/DL (ref 0.93–1.7)
TRIGL SERPL-MCNC: 253 MG/DL (ref 0–150)
TSH SERPL DL<=0.05 MIU/L-ACNC: 1.05 UIU/ML (ref 0.27–4.2)
VLDLC SERPL-MCNC: 44 MG/DL (ref 5–40)
WBC NRBC COR # BLD: 7.88 10*3/MM3 (ref 3.4–10.8)

## 2022-07-20 NOTE — PROGRESS NOTES
Lipid level slightly elevated.  He needs to follow a low-fat diet and engage in routine exercise.  He still has 3 labs pending.  We will call when we have final results.

## 2022-07-25 LAB
TESTOST FREE SERPL-MCNC: 30.2 PG/ML (ref 9.3–26.5)
TESTOST SERPL-MCNC: 1388.7 NG/DL (ref 264–916)

## 2022-07-25 NOTE — PROGRESS NOTES
His testosterone level is elevated.  He needs to follow-up with the provider that is prescribing this to have his dosage readjusted.  This may be causing the elevation in his blood pressure.  He still has 2 labs pending.  I would like to see him in 1 month.

## 2022-07-26 LAB
ALDOST SERPL-MCNC: 3.2 NG/DL (ref 0–30)
ALDOST/RENIN PLAS-RTO: 2.5 {RATIO} (ref 0–30)
METANEPH FREE SERPL-MCNC: 17.7 PG/ML (ref 0–88)
NORMETANEPHRINE SERPL-MCNC: 84.4 PG/ML (ref 0–210.1)
RENIN PLAS-CCNC: 1.27 NG/ML/HR (ref 0.17–5.38)

## 2022-08-23 ENCOUNTER — TRANSCRIBE ORDERS (OUTPATIENT)
Dept: PHYSICAL THERAPY | Facility: HOSPITAL | Age: 27
End: 2022-08-23

## 2022-08-23 DIAGNOSIS — M25.532 BILATERAL WRIST PAIN: Primary | ICD-10-CM

## 2022-08-23 DIAGNOSIS — M25.531 BILATERAL WRIST PAIN: Primary | ICD-10-CM

## 2025-02-21 NOTE — PROGRESS NOTES
HR=44 bpm, RDFH=962/92 mmhg, SpO2=94.0 %, Resp=12 B/min, EtCO2=38 mmHg, Apnea=2 Seconds, Pain=0, Mesfin=9, Jaqueline=2 I have reviewed the notes, assessments, and/or procedures performed by Anna Quick MD, I concur with her/his documentation and assessment and plan for Antinoe Villaseñor.                This document has been electronically signed by Juvenal Rodgers MD on November 4, 2019 10:20 AM

## (undated) DEVICE — SINGLE-USE BIOPSY FORCEPS: Brand: RADIAL JAW 4